# Patient Record
Sex: FEMALE | Race: WHITE | NOT HISPANIC OR LATINO | Employment: UNEMPLOYED | URBAN - NONMETROPOLITAN AREA
[De-identification: names, ages, dates, MRNs, and addresses within clinical notes are randomized per-mention and may not be internally consistent; named-entity substitution may affect disease eponyms.]

---

## 2017-01-31 ENCOUNTER — OFFICE VISIT (OUTPATIENT)
Dept: PSYCHIATRY | Facility: CLINIC | Age: 42
End: 2017-01-31

## 2017-01-31 VITALS
SYSTOLIC BLOOD PRESSURE: 132 MMHG | WEIGHT: 200.4 LBS | BODY MASS INDEX: 36.88 KG/M2 | DIASTOLIC BLOOD PRESSURE: 90 MMHG | HEIGHT: 62 IN

## 2017-01-31 DIAGNOSIS — F29 ATYPICAL PSYCHOSIS (HCC): Primary | ICD-10-CM

## 2017-01-31 PROCEDURE — 99213 OFFICE O/P EST LOW 20 MIN: CPT | Performed by: PSYCHIATRY & NEUROLOGY

## 2017-01-31 RX ORDER — LURASIDONE HYDROCHLORIDE 40 MG/1
40 TABLET, FILM COATED ORAL DAILY
Qty: 30 TABLET | Refills: 1 | Status: SHIPPED | OUTPATIENT
Start: 2017-01-31 | End: 2017-05-09 | Stop reason: ALTCHOICE

## 2017-01-31 NOTE — PROGRESS NOTES
"Patient ID: Jazzy Valle is a 41 y.o. female    SERVICE TYPE: EVALUATION AND MANAGEMENT (greater than 50% of the time spent for supportive psychotherapy).      Visit Vitals   • /90   • Ht 62\" (157.5 cm)   • Wt 200 lb 6.4 oz (90.9 kg)   • BMI 36.65 kg/m2       ALLERGIES:  Review of patient's allergies indicates no known allergies.    CC: \"Would like to change medicine, still hearing the voices\"    FOLLOWED FOR: depression/ psychosis.     HPI: voices \"get pretty wild\". \"Little depressed\". Interest and activities normal for her. Didn't hear voices with the Clozeril\". Sleeping good.   Being tapered at 2.5 strips for her cervical and lumbar pain. Paraesthesia right hand has been worse, s/p surgery 2008.  No indication of any drug misuse or abuse.    PFSH: 6 y/o adopted daughter doing well, living with her mother. Occasionally with her BF.All seems fairly stable.     Review of Systems   Respiratory: Negative.    Cardiovascular: Negative.    Gastrointestinal: Negative.        SUPPORTIVE PSYCHOTHERAPY: continuing efforts to promote the therapeutic alliance, address the patient’s issues, and strengthen self awareness, insights, and coping skills.       Mental Status Exam  Appearance:  clean and casually dressed, appropriate  Attitude toward clinician:  cooperative and agreeable   Speech:    Rate:  regular rate and rhythm   Volume: normal  Motor:  no abnormal movements present  Mood:  Good  Affect:  euthymic  Thought Processes:  linear, logical, and goal directed  Thought Content:  Normal   Feeling Hopeless: absent  Suicidal Thoughts:  absent  Homicidal Thoughts:  absent  Perceptual Disturbance: no perceptual disturbance  Attention and Concentration:  good  Insight and Judgement:  good  Memory:  memory appears to be intact    LABS:   Lab Results (last 7 days)     ** No results found for the last 168 hours. **          MEDICATION ISSUES: Have discussed with the patient the medications Risks, Benefits, and Side effects " including potential falls, possible impaired driving and  metabolic adversities among others. No medication side effects or related complaints today.     TREATMENT PLAN/GOALS: Continue supportive psychotherapy efforts and medications as indicated.     Current Outpatient Prescriptions   Medication Sig Dispense Refill   • escitalopram (LEXAPRO) 20 MG tablet One and one half daily 46 tablet 0   • gabapentin (NEURONTIN) 600 MG tablet      • ibuprofen (ADVIL,MOTRIN) 600 MG tablet      • lurasidone (LATUDA) 40 MG tablet tablet Take 1 tablet by mouth Daily. 30 tablet 1   • metFORMIN (GLUCOPHAGE) 500 MG tablet      • SUBOXONE 8-2 MG film film        No current facility-administered medications for this visit.        COLLATERAL PSYCHOTHERAPEUTIC INTERVENTION:patient is not interested in additional psychotherapy.     Encounter Diagnosis   Name Primary?   • Atypical psychosis Yes       Return in about 2 months (around 3/31/2017).  Patient knows to call if symptoms worsen or fail to improve between appointments.

## 2017-01-31 NOTE — MR AVS SNAPSHOT
Jazzy TRISTAN Leonard   1/31/2017 3:00 PM   Office Visit    Dept Phone:  [phone]   Encounter #:  [id number]    Provider:  Lloyd Barone MD   Department:  [facility]                Your Full Care Plan              Today's Medication Changes          These changes are accurate as of: 1/31/17  4:09 PM.  If you have any questions, ask your nurse or doctor.               New Medication(s)Ordered:     lurasidone 40 MG tablet tablet   Commonly known as:  LATUDA   Take 1 tablet by mouth Daily.         Stop taking medication(s)listed here:     haloperidol 5 MG tablet   Commonly known as:  HALDOL                Where to Get Your Medications      These medications were sent to [place], [place] - [address] [address] [address] - [phone] [place] [phone]   [address] [facility] [facility] [address]     Phone:  [phone]     lurasidone 40 MG tablet tablet                  Your Updated Medication List          This list is accurate as of: 1/31/17  4:09 PM.  Always use your most recent med list.                escitalopram 20 MG tablet   Commonly known as:  LEXAPRO   One and one half daily       gabapentin 600 MG tablet   Commonly known as:  NEURONTIN       ibuprofen 600 MG tablet   Commonly known as:  ADVIL,MOTRIN       lurasidone 40 MG tablet tablet   Commonly known as:  LATUDA   Take 1 tablet by mouth Daily.       metFORMIN 500 MG tablet   Commonly known as:  GLUCOPHAGE       SUBOXONE 8-2 MG film film   Generic drug:  buprenorphine-naloxone               You Were Diagnosed With        Codes Comments    Atypical psychosis    -  Primary ICD-10-CM: F29  ICD-9-CM: 298.9       Instructions     None    Patient Instructions History      Upcoming Appointments     Visit Type Date Time Department    MEDICINE CHECK 1/31/2017  3:00 PM [facility]    MEDICINE CHECK 3/29/2017 11:30 AM [facility]      MyChart Signup     [facility] MyChart allows you to  "send messages to your doctor, view your test results, renew your prescriptions, schedule appointments, and more. To sign up, go to [employer] and click on the Sign Up Now link in the New User? box. Enter your [employer] Activation Code exactly as it appears below along with the last four digits of your Social Security Number and your Date of Birth () to complete the sign-up process. If you do not sign up before the expiration date, you must request a new code.    [employer] Activation Code: [email or web address]  Expires: 2017  4:09 PM    If you have questions, you can email [email or web address] or call [phone] to talk to our [employer] staff. Remember, [employer] is NOT to be used for urgent needs. For medical emergencies, dial 911.               Other Info from Your Visit           Your Appointments     Mar 29, 2017 11:30 AM EDT   Medicine Check with Lloyd Barone MD   [facility] (--)    [address] [place] [id number]   [phone]              Allergies     No Known Allergies      Vital Signs     Blood Pressure Height Weight Body Mass Index Smoking Status       132/90 62\" (157.5 cm) 200 lb 6.4 oz (90.9 kg) 36.65 kg/m2 Current Every Day Smoker       Problems and Diagnoses Noted     Atypical psychosis    -  Primary        "

## 2017-02-06 ENCOUNTER — TELEPHONE (OUTPATIENT)
Dept: PSYCHIATRY | Facility: HOSPITAL | Age: 42
End: 2017-02-06

## 2017-02-06 NOTE — TELEPHONE ENCOUNTER
"Patient called and stated that she thinks the Latuda is making her blood pressure increase. States her \"bottom number has been over 100\".   "

## 2017-02-13 ENCOUNTER — TELEPHONE (OUTPATIENT)
Dept: PSYCHIATRY | Facility: CLINIC | Age: 42
End: 2017-02-13

## 2017-02-13 NOTE — TELEPHONE ENCOUNTER
Patient states the Latuda is making her blood pressure increase. Also states it isn't working. Requests change.

## 2017-02-14 ENCOUNTER — TELEPHONE (OUTPATIENT)
Dept: PSYCHIATRY | Facility: CLINIC | Age: 42
End: 2017-02-14

## 2017-02-15 ENCOUNTER — TELEPHONE (OUTPATIENT)
Dept: PSYCHIATRY | Facility: CLINIC | Age: 42
End: 2017-02-15

## 2017-02-15 NOTE — TELEPHONE ENCOUNTER
Contacted the patient. Reassured patient Latuda would not cause hypertension and advised her to actually increase the dose 80 mg daily due to continuing problems with auditory hallucinations. Too do so, will call PRN between now and next appointment.

## 2017-02-15 NOTE — TELEPHONE ENCOUNTER
Jazzy's mom said that Jazzy  is not doing well and needs you to call them or to change Jazzy's medication.

## 2017-03-06 ENCOUNTER — TELEPHONE (OUTPATIENT)
Dept: PSYCHIATRY | Facility: CLINIC | Age: 42
End: 2017-03-06

## 2017-03-06 ENCOUNTER — DOCUMENTATION (OUTPATIENT)
Dept: PSYCHIATRY | Facility: CLINIC | Age: 42
End: 2017-03-06

## 2017-03-06 NOTE — TELEPHONE ENCOUNTER
They are having problems with Jazzy and would like for you to call them if possible.    1600 hr. Had to leave message that I had call and for her to call back prn.

## 2017-03-06 NOTE — PROGRESS NOTES
Talked to the patient's mother concerned that the patient is continuing to experience Auditory Hallucinations, is pacing (possibly Akathisia?), and initial insomnia. No commanding hallucinations, no Si or HI.   Will stop the Latuda and starting tomorrow evening take Zyprexa 5 mg @ hs. Has supply for 13 days, will call back next week to let us know how she has been.

## 2017-03-14 RX ORDER — ESCITALOPRAM OXALATE 20 MG/1
TABLET ORAL
Qty: 46 TABLET | Refills: 0 | Status: CANCELLED | OUTPATIENT
Start: 2017-03-14

## 2017-03-14 NOTE — TELEPHONE ENCOUNTER
Requesting rx for lexapro.  Due to refill and next appointment is 3-29-17.      Patient called back today and requesting a call from you.  She said her medication isn't working and she was wanting to try something else.  Her next appointment is 3-29-17.

## 2017-03-15 ENCOUNTER — TELEPHONE (OUTPATIENT)
Dept: PSYCHIATRY | Facility: CLINIC | Age: 42
End: 2017-03-15

## 2017-03-17 ENCOUNTER — TELEPHONE (OUTPATIENT)
Dept: PSYCHIATRY | Facility: CLINIC | Age: 42
End: 2017-03-17

## 2017-03-17 RX ORDER — OLANZAPINE 5 MG/1
5 TABLET ORAL NIGHTLY
Qty: 30 TABLET | Refills: 0 | Status: SHIPPED | OUTPATIENT
Start: 2017-03-17 | End: 2017-04-17 | Stop reason: SDUPTHER

## 2017-03-17 RX ORDER — ESCITALOPRAM OXALATE 20 MG/1
TABLET ORAL
Qty: 46 TABLET | Refills: 0 | Status: SHIPPED | OUTPATIENT
Start: 2017-03-17 | End: 2017-04-17 | Stop reason: SDUPTHER

## 2017-03-17 NOTE — TELEPHONE ENCOUNTER
Pt's mother called stating that she is needing lexapro and zyprexa. Please call Viktoriya at [phone]    Will call in Rx for Zyprexa 5 mg daily and Lexapro 20 mg daily.

## 2017-03-30 ENCOUNTER — TELEPHONE (OUTPATIENT)
Dept: PSYCHIATRY | Facility: CLINIC | Age: 42
End: 2017-03-30

## 2017-04-11 ENCOUNTER — TELEPHONE (OUTPATIENT)
Dept: PSYCHIATRY | Facility: CLINIC | Age: 42
End: 2017-04-11

## 2017-04-11 NOTE — TELEPHONE ENCOUNTER
Attempted to call patient, had to leave a message noting that since patient's last appointment here in January 31 there've been separate phone calls and 3 missed appointments.  Advised him to call when necessary.

## 2017-04-18 RX ORDER — OLANZAPINE 5 MG/1
5 TABLET ORAL NIGHTLY
Qty: 30 TABLET | Refills: 0 | Status: SHIPPED | OUTPATIENT
Start: 2017-04-18 | End: 2017-05-09 | Stop reason: SDUPTHER

## 2017-04-18 RX ORDER — ESCITALOPRAM OXALATE 20 MG/1
TABLET ORAL
Qty: 46 TABLET | Refills: 0 | Status: SHIPPED | OUTPATIENT
Start: 2017-04-18 | End: 2017-05-09 | Stop reason: SDUPTHER

## 2017-04-18 NOTE — TELEPHONE ENCOUNTER
OK for the refills but let patient know last with seeing her in the clinic, will need to keep her appointment.

## 2017-05-09 ENCOUNTER — OFFICE VISIT (OUTPATIENT)
Dept: PSYCHIATRY | Facility: CLINIC | Age: 42
End: 2017-05-09

## 2017-05-09 VITALS
SYSTOLIC BLOOD PRESSURE: 124 MMHG | WEIGHT: 195 LBS | HEIGHT: 62 IN | BODY MASS INDEX: 35.88 KG/M2 | HEART RATE: 99 BPM | DIASTOLIC BLOOD PRESSURE: 82 MMHG

## 2017-05-09 DIAGNOSIS — Z79.899 LONG TERM USE OF DRUG: Primary | ICD-10-CM

## 2017-05-09 DIAGNOSIS — F29 ATYPICAL PSYCHOSIS (HCC): ICD-10-CM

## 2017-05-09 LAB
AMPHETAMINE CUT-OFF: ABNORMAL
BENZODIAZIPINE CUT-OFF: ABNORMAL
BUPRENORPHINE CUT-OFF: ABNORMAL
COCAINE CUT-OFF: ABNORMAL
EXTERNAL AMPHETAMINE SCREEN URINE: POSITIVE
EXTERNAL BENZODIAZEPINE SCREEN URINE: NEGATIVE
EXTERNAL BUPRENORPHINE SCREEN URINE: POSITIVE
EXTERNAL COCAINE SCREEN URINE: NEGATIVE
EXTERNAL MDMA: NEGATIVE
EXTERNAL METHADONE SCREEN URINE: NEGATIVE
EXTERNAL METHAMPHETAMINE SCREEN URINE: NEGATIVE
EXTERNAL OPIATES SCREEN URINE: NEGATIVE
EXTERNAL OXYCODONE SCREEN URINE: NEGATIVE
EXTERNAL THC SCREEN URINE: NEGATIVE
MDMA CUT-OFF: ABNORMAL
METHADONE CUT-OFF: ABNORMAL
METHAMPHETAMINE CUT-OFF: ABNORMAL
OPIATES CUT-OFF: ABNORMAL
OXYCODONE CUT-OFF: ABNORMAL
THC CUT-OFF: ABNORMAL

## 2017-05-09 PROCEDURE — 99213 OFFICE O/P EST LOW 20 MIN: CPT | Performed by: PSYCHIATRY & NEUROLOGY

## 2017-05-09 RX ORDER — NAPROXEN 500 MG/1
TABLET ORAL DAILY
Refills: 0 | Status: ON HOLD | COMMUNITY
Start: 2017-05-04 | End: 2019-10-01

## 2017-05-09 RX ORDER — ATENOLOL 50 MG/1
TABLET ORAL DAILY
Refills: 5 | Status: ON HOLD | COMMUNITY
Start: 2017-04-28 | End: 2019-10-01

## 2017-05-09 RX ORDER — OLANZAPINE 5 MG/1
7.5 TABLET ORAL NIGHTLY
Qty: 30 TABLET | Refills: 1 | Status: SHIPPED | OUTPATIENT
Start: 2017-05-09 | End: 2017-06-16 | Stop reason: SDUPTHER

## 2017-05-09 RX ORDER — ESCITALOPRAM OXALATE 20 MG/1
TABLET ORAL
Qty: 46 TABLET | Refills: 1 | Status: SHIPPED | OUTPATIENT
Start: 2017-05-09 | End: 2017-08-03 | Stop reason: SDUPTHER

## 2017-05-09 RX ORDER — FLUTICASONE PROPIONATE 50 MCG
SPRAY, SUSPENSION (ML) NASAL
Refills: 2 | COMMUNITY
Start: 2017-04-28 | End: 2019-05-23

## 2017-05-23 ENCOUNTER — TELEPHONE (OUTPATIENT)
Dept: PSYCHIATRY | Facility: CLINIC | Age: 42
End: 2017-05-23

## 2017-05-24 ENCOUNTER — TELEPHONE (OUTPATIENT)
Dept: PSYCHIATRY | Facility: CLINIC | Age: 42
End: 2017-05-24

## 2017-05-25 ENCOUNTER — TELEPHONE (OUTPATIENT)
Dept: PSYCHIATRY | Facility: CLINIC | Age: 42
End: 2017-05-25

## 2017-05-26 ENCOUNTER — TELEPHONE (OUTPATIENT)
Dept: PSYCHIATRY | Facility: CLINIC | Age: 42
End: 2017-05-26

## 2017-06-16 RX ORDER — OLANZAPINE 5 MG/1
5 TABLET ORAL 2 TIMES DAILY
Qty: 60 TABLET | Refills: 0 | Status: SHIPPED | OUTPATIENT
Start: 2017-06-16 | End: 2017-08-03 | Stop reason: SDUPTHER

## 2017-06-16 NOTE — TELEPHONE ENCOUNTER
Rx request.  Med are due for refill, on 5-26-17 you increased dose to 5mg BID.  Her next appt is 7-11-17.

## 2017-08-03 RX ORDER — ESCITALOPRAM OXALATE 20 MG/1
TABLET ORAL
Qty: 46 TABLET | Refills: 0 | Status: SHIPPED | OUTPATIENT
Start: 2017-08-03 | End: 2017-08-29 | Stop reason: SDUPTHER

## 2017-08-03 RX ORDER — OLANZAPINE 5 MG/1
5 TABLET ORAL 2 TIMES DAILY
Qty: 60 TABLET | Refills: 0 | Status: SHIPPED | OUTPATIENT
Start: 2017-08-03 | End: 2017-08-29 | Stop reason: SDUPTHER

## 2017-08-03 NOTE — TELEPHONE ENCOUNTER
Rx refill,  meds are due.  However, patient has no pending appt, no showed for appt on 7-11-17 and was last seen 5-9-17.

## 2017-08-29 RX ORDER — OLANZAPINE 5 MG/1
5 TABLET ORAL 2 TIMES DAILY
Qty: 60 TABLET | Refills: 0 | Status: SHIPPED | OUTPATIENT
Start: 2017-08-29 | End: 2017-12-11 | Stop reason: SDUPTHER

## 2017-08-29 RX ORDER — ESCITALOPRAM OXALATE 20 MG/1
TABLET ORAL
Qty: 46 TABLET | Refills: 0 | Status: SHIPPED | OUTPATIENT
Start: 2017-08-29 | End: 2017-09-12 | Stop reason: SDUPTHER

## 2017-09-12 ENCOUNTER — TELEPHONE (OUTPATIENT)
Dept: PSYCHIATRY | Facility: CLINIC | Age: 42
End: 2017-09-12

## 2017-09-12 RX ORDER — ESCITALOPRAM OXALATE 20 MG/1
TABLET ORAL
Qty: 46 TABLET | Refills: 0 | Status: SHIPPED | OUTPATIENT
Start: 2017-09-12 | End: 2017-12-11 | Stop reason: SDUPTHER

## 2017-09-12 NOTE — TELEPHONE ENCOUNTER
"Received email asking that I call the patient's mother ([phone]).    Says her voices are worse, have not seen the patient since May.   Says taking the Zyprexa 5 mg daily (not bid), the Lexapro 20 mg daily.   \"Not depressed\"  Voices \"told me to go to the hospital, then to not go\". No SI/HI.   No paranoia.   Will keep her appointment Oct 17, or come to the hospital sooner PRN.   Called in her meds to do till then.           "

## 2017-11-30 ENCOUNTER — TELEPHONE (OUTPATIENT)
Dept: PSYCHIATRY | Facility: CLINIC | Age: 42
End: 2017-11-30

## 2017-11-30 NOTE — TELEPHONE ENCOUNTER
Significant give the patient appointment in the next week or 2, call her mother and let her know when it is.

## 2017-12-11 ENCOUNTER — OFFICE VISIT (OUTPATIENT)
Dept: PSYCHIATRY | Facility: CLINIC | Age: 42
End: 2017-12-11

## 2017-12-11 VITALS
BODY MASS INDEX: 33.86 KG/M2 | SYSTOLIC BLOOD PRESSURE: 116 MMHG | DIASTOLIC BLOOD PRESSURE: 81 MMHG | WEIGHT: 184 LBS | HEART RATE: 89 BPM | HEIGHT: 62 IN

## 2017-12-11 DIAGNOSIS — F29 ATYPICAL PSYCHOSIS (HCC): Primary | ICD-10-CM

## 2017-12-11 DIAGNOSIS — E78.9 DISORDER OF LIPOPROTEIN METABOLISM: ICD-10-CM

## 2017-12-11 DIAGNOSIS — E16.1 OTHER HYPOGLYCEMIA (CODE): ICD-10-CM

## 2017-12-11 DIAGNOSIS — G89.4 CHRONIC PAIN SYNDROME: ICD-10-CM

## 2017-12-11 PROCEDURE — 99213 OFFICE O/P EST LOW 20 MIN: CPT | Performed by: PSYCHIATRY & NEUROLOGY

## 2017-12-11 RX ORDER — GABAPENTIN 600 MG/1
TABLET ORAL 3 TIMES DAILY
Refills: 0 | Status: ON HOLD | COMMUNITY
Start: 2017-12-04 | End: 2019-10-01

## 2017-12-11 RX ORDER — ESCITALOPRAM OXALATE 20 MG/1
TABLET ORAL
Qty: 90 TABLET | Refills: 1 | Status: SHIPPED | OUTPATIENT
Start: 2017-12-11 | End: 2017-12-11 | Stop reason: SDUPTHER

## 2017-12-11 RX ORDER — ESCITALOPRAM OXALATE 20 MG/1
TABLET ORAL
Qty: 138 TABLET | Refills: 1 | Status: SHIPPED | OUTPATIENT
Start: 2017-12-11 | End: 2018-02-01 | Stop reason: SDUPTHER

## 2017-12-11 RX ORDER — OLANZAPINE 5 MG/1
TABLET ORAL
Qty: 90 TABLET | Refills: 1 | Status: SHIPPED | OUTPATIENT
Start: 2017-12-11 | End: 2018-02-01 | Stop reason: SDUPTHER

## 2017-12-11 NOTE — PROGRESS NOTES
"Patient ID: Jazzy Valle is a 42 y.o. female    SERVICE TYPE: EVALUATION AND MANAGEMENT (greater than 50% of the time spent for supportive psychotherapy).      /81  Pulse 89  Ht 157 cm (61.81\")  Wt 83.5 kg (184 lb)  BMI 33.86 kg/m2    ALLERGIES:  Review of patient's allergies indicates no known allergies.    CC: \"OK, feel better than I was\"    FOLLOWED FOR: depression/ psychosis    HPI:Last seen in May, 9 phone calls since. Experiencing auditory hallucination, no commends, no specifics. Sleeping well, not depressed, no paranoia, no anxiety attaches. Interest, energy, activities all relatively normal.   Receipt of the SSRI benefits continue.  Receives approximately 20 mg daily of the Suboxone strips plus gabapentin from the [facility].    PFSH: Still raising the cousin's 6 y/o daughter, living with mother (sever COPD). 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 No intervening major psychosocial stresses since last contact. A cousin did die a month ago.     Review of Systems   Respiratory: Negative.    Cardiovascular: Negative.    Gastrointestinal: Negative.    Neurological: Negative.        SUPPORTIVE PSYCHOTHERAPY: continuing efforts to promote the therapeutic alliance, address the patient’s issues, and strengthen self awareness, insights, and coping skills.       Mental Status Exam  Appearance:  clean and casually dressed, appropriate  Attitude toward clinician:  cooperative and agreeable   Speech:    Rate:  regular rate and rhythm   Volume: normal  Motor:  no abnormal movements present  Mood:  Good  Affect:  euthymic  Thought Processes:  linear, logical, and goal directed  Thought Content:  Normal   Feeling Hopeless: absent  Suicidal Thoughts:  absent  Homicidal Thoughts:  absent  Perceptual Disturbance: no perceptual disturbance  Attention and Concentration:  good  Insight and Judgement:  good  Memory:  memory appears to be intact    LABS:  CMP, Lipids, Hb A1c and UDS this month. "     MEDICATION ISSUES: Have discussed with the patient the medications Risks, Benefits, and Side effects including potential falls, possible impaired driving and  metabolic adversities among others. No medication side effects or related complaints today.     TREATMENT PLAN/GOALS: Continue supportive psychotherapy efforts and medications as indicated.     Current Outpatient Prescriptions   Medication Sig Dispense Refill   • atenolol (TENORMIN) 50 MG tablet Daily.  5   • escitalopram (LEXAPRO) 20 MG tablet One and one half daily 90 tablet 1   • fluticasone (FLONASE) 50 MCG/ACT nasal spray   2   • gabapentin (NEURONTIN) 600 MG tablet 3 (Three) Times a Day.  0   • ibuprofen (ADVIL,MOTRIN) 600 MG tablet      • metFORMIN (GLUCOPHAGE) 500 MG tablet      • naproxen (NAPROSYN) 500 MG tablet Daily.  0   • OLANZapine (ZYPREXA) 5 MG tablet One daily 90 tablet 1   • SUBOXONE 8-2 MG film film        No current facility-administered medications for this visit.        COLLATERAL PSYCHOTHERAPEUTIC INTERVENTION: patient not interested in additional psychotherapy.     Encounter Diagnosis   Name Primary?   • Atypical psychosis Yes       Return in about 6 months (around 6/11/2018).  Patient knows to call if symptoms worsen or fail to improve between appointments.

## 2017-12-27 ENCOUNTER — TELEPHONE (OUTPATIENT)
Dept: PSYCHIATRY | Facility: CLINIC | Age: 42
End: 2017-12-27

## 2017-12-27 NOTE — TELEPHONE ENCOUNTER
Patient called stating on the Zyprexa she is hearing stuff states is there some other medication you can add to it or change her to another medication. Please advise.

## 2017-12-28 NOTE — TELEPHONE ENCOUNTER
Advised patient to simply increase the dose of Zyprexa and schedule her for employment within the next 4-6 weeks.

## 2018-02-01 ENCOUNTER — OFFICE VISIT (OUTPATIENT)
Dept: PSYCHIATRY | Facility: CLINIC | Age: 43
End: 2018-02-01

## 2018-02-01 VITALS
SYSTOLIC BLOOD PRESSURE: 125 MMHG | HEIGHT: 62 IN | WEIGHT: 194 LBS | DIASTOLIC BLOOD PRESSURE: 81 MMHG | HEART RATE: 100 BPM | BODY MASS INDEX: 35.7 KG/M2

## 2018-02-01 DIAGNOSIS — F29 ATYPICAL PSYCHOSIS (HCC): Primary | ICD-10-CM

## 2018-02-01 DIAGNOSIS — E78.9 DISORDER OF LIPOPROTEIN METABOLISM: ICD-10-CM

## 2018-02-01 DIAGNOSIS — R73.9 HYPERGLYCEMIA: ICD-10-CM

## 2018-02-01 PROCEDURE — 99213 OFFICE O/P EST LOW 20 MIN: CPT | Performed by: PSYCHIATRY & NEUROLOGY

## 2018-02-01 RX ORDER — ESCITALOPRAM OXALATE 20 MG/1
TABLET ORAL
Qty: 138 TABLET | Refills: 0 | Status: SHIPPED | OUTPATIENT
Start: 2018-02-01 | End: 2018-08-15 | Stop reason: SDUPTHER

## 2018-02-01 RX ORDER — OLANZAPINE 5 MG/1
TABLET ORAL
Qty: 90 TABLET | Refills: 0 | Status: SHIPPED | OUTPATIENT
Start: 2018-02-01 | End: 2018-03-30

## 2018-02-01 NOTE — PROGRESS NOTES
"Patient ID: Jazzy Valle is a 42 y.o. female    SERVICE TYPE: EVALUATION AND MANAGEMENT (greater than 50% of the time spent for supportive psychotherapy).      /81  Pulse 100  Ht 157 cm (61.81\")  Wt 88 kg (194 lb)  BMI 35.7 kg/m2    ALLERGIES:  Review of patient's allergies indicates no known allergies.    CC: \"Decent\"    FOLLOWED FOR: depression/ psychosis       HPI: \"Some voices, not as loud\" no commands. Considers possible her thoughts. Not been significantly depressed. Sleep normally. No current paranoia. \"The voices have calmed down\" going about her activities without limitation. 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 Keeps her busy. Also raising her 5 y/o cousin, that goes well.     PFSH: mother's COPD gradually worse. No current BF. Home life stable.     Review of Systems   Respiratory: Negative.    Cardiovascular: Negative.    Gastrointestinal: Negative.    Musculoskeletal: Positive for back pain and neck pain.   Continues Suboxone 20 mgs daily plus Neurontin 600 mg tid per [facility] [place] $375/ month.     SUPPORTIVE PSYCHOTHERAPY: continuing efforts to promote the therapeutic alliance, address the patient’s issues, and strengthen self awareness, insights, and coping skills.       Mental Status Exam  Appearance:  clean and casually dressed, appropriate  Attitude toward clinician:  cooperative and agreeable   Speech:    Rate:  regular rate and rhythm   Volume: normal  Motor:  no abnormal movements present  Mood:  Good  Affect:  euthymic  Thought Processes:  linear, logical, and goal directed  Thought Content:  Normal   Feeling Hopeless: absent  Suicidal Thoughts:  absent  Homicidal Thoughts:  absent  Perceptual Disturbance: no perceptual disturbance  Attention and Concentration:  good  Insight and Judgement:  good  Memory:  memory appears to be intact    LABS: Patient to have a fasting CMP Lipid panel and HbA1c done this next week [facility] lab. "     MEDICATION ISSUES: Have discussed with the patient the medications Risks, Benefits, and Side effects including potential falls, possible impaired driving and  metabolic adversities among others. No medication side effects or related complaints today.     TREATMENT PLAN/GOALS: Continue supportive psychotherapy efforts and medications as indicated.     Current Outpatient Prescriptions   Medication Sig Dispense Refill   • atenolol (TENORMIN) 50 MG tablet Daily.  5   • escitalopram (LEXAPRO) 20 MG tablet One and one half daily 138 tablet 0   • fluticasone (FLONASE) 50 MCG/ACT nasal spray   2   • gabapentin (NEURONTIN) 600 MG tablet 3 (Three) Times a Day.  0   • ibuprofen (ADVIL,MOTRIN) 600 MG tablet      • metFORMIN (GLUCOPHAGE) 500 MG tablet      • naproxen (NAPROSYN) 500 MG tablet Daily.  0   • OLANZapine (ZYPREXA) 5 MG tablet One daily 90 tablet 0   • SUBOXONE 8-2 MG film film        No current facility-administered medications for this visit.        COLLATERAL PSYCHOTHERAPEUTIC INTERVENTION: patient not interested in additional psychotherapy.     Encounter Diagnoses   Name Plan   • Atypical psychosis Continue the Lexapro and Zyprexa    • Hyperglycemia  Concern for metabolic side effects of the Zyprexa diagnosis justifies the blood test ordered.     • Disorder of lipoprotein metabolism  Concern for the potential metabolic side effects of the Zyprexa        Return in about 3 months (around 5/1/2018).  Patient knows to call if symptoms worsen or fail to improve between appointments.

## 2018-02-08 ENCOUNTER — OFFICE VISIT (OUTPATIENT)
Dept: RETAIL CLINIC | Facility: CLINIC | Age: 43
End: 2018-02-08

## 2018-02-08 VITALS
HEART RATE: 84 BPM | BODY MASS INDEX: 35.63 KG/M2 | WEIGHT: 193.6 LBS | TEMPERATURE: 98.2 F | RESPIRATION RATE: 18 BRPM | OXYGEN SATURATION: 98 %

## 2018-02-08 DIAGNOSIS — K59.00 CONSTIPATION, UNSPECIFIED CONSTIPATION TYPE: Primary | ICD-10-CM

## 2018-02-08 PROCEDURE — 99213 OFFICE O/P EST LOW 20 MIN: CPT | Performed by: NURSE PRACTITIONER

## 2018-02-09 NOTE — PATIENT INSTRUCTIONS
Constipation, Adult  Constipation is when a person:  · Poops (has a bowel movement) fewer times in a week than normal.  · Has a hard time pooping.  · Has poop that is dry, hard, or bigger than normal.  Follow these instructions at home:  Eating and drinking  · Eat foods that have a lot of fiber, such as:  ¨ Fresh fruits and vegetables.  ¨ Whole grains.  ¨ Beans.  · Eat less of foods that are high in fat, low in fiber, or overly processed, such as:  ¨ French fries.  ¨ Hamburgers.  ¨ Cookies.  ¨ Candy.  ¨ Soda.  · Drink enough fluid to keep your pee (urine) clear or pale yellow.  General instructions  · Exercise regularly or as told by your doctor.  · Go to the restroom when you feel like you need to poop. Do not hold it in.  · Take over-the-counter and prescription medicines only as told by your doctor. These include any fiber supplements.  · Do pelvic floor retraining exercises, such as:  ¨ Doing deep breathing while relaxing your lower belly (abdomen).  ¨ Relaxing your pelvic floor while pooping.  · Watch your condition for any changes.  · Keep all follow-up visits as told by your doctor. This is important.  Contact a doctor if:  · You have pain that gets worse.  · You have a fever.  · You have not pooped for 4 days.  · You throw up (vomit).  · You are not hungry.  · You lose weight.  · You are bleeding from the anus.  · You have thin, pencil-like poop (stool).  Get help right away if:  · You have a fever, and your symptoms suddenly get worse.  · You leak poop or have blood in your poop.  · Your belly feels hard or bigger than normal (is bloated).  · You have very bad belly pain.  · You feel dizzy or you faint.  This information is not intended to replace advice given to you by your health care provider. Make sure you discuss any questions you have with your health care provider.  Document Released: 06/05/2009 Document Revised: 07/07/2017 Document Reviewed: 06/07/2017  [employer] Interactive Patient Education © 2017  [employer]

## 2018-02-09 NOTE — PROGRESS NOTES
Subjective   Jazzy Valle is a 42 y.o. female.   Chief Complaint   Patient presents with   • GI Problem      GI Problem   The primary symptoms include abdominal pain. Primary symptoms do not include fever, weight loss, fatigue, nausea, vomiting, diarrhea or dysuria. The illness began yesterday. The onset was gradual. The problem has been gradually improving.   The illness is also significant for constipation (no BM in 3-4 days). The illness does not include chills, anorexia, dysphagia, bloating, tenesmus or back pain.        The following portions of the patient's history were reviewed and updated as appropriate: allergies, current medications, past family history, past medical history, past social history, past surgical history and problem list.    Review of Systems   Constitutional: Negative.  Negative for chills, fatigue, fever and weight loss.   HENT: Negative.    Eyes: Negative.    Respiratory: Negative.    Gastrointestinal: Positive for abdominal pain and constipation (no BM in 3-4 days). Negative for abdominal distention, anorexia, bloating, diarrhea, dysphagia, nausea and vomiting.   Genitourinary: Negative.  Negative for dysuria.   Musculoskeletal: Negative for back pain.   Skin: Negative.    Allergic/Immunologic: Negative.    Psychiatric/Behavioral: Negative.        Objective   No Known Allergies    Physical Exam   Constitutional: She is oriented to person, place, and time. She appears well-developed and well-nourished.   HENT:   Mouth/Throat: Oropharynx is clear and moist.   Eyes: Pupils are equal, round, and reactive to light.   Cardiovascular: Normal rate.    Pulmonary/Chest: Effort normal.   Abdominal: Soft. Bowel sounds are normal. She exhibits no distension. There is no hepatosplenomegaly. There is no tenderness. There is no rigidity, no rebound, no guarding, no CVA tenderness and negative Newberry's sign.   Neurological: She is oriented to person, place, and time.   Skin: Skin is warm and dry.    Psychiatric: She has a normal mood and affect.   Nursing note and vitals reviewed.      Assessment/Plan   Jazzy was seen today for gi problem.    Diagnoses and all orders for this visit:    Constipation, unspecified constipation type                 This document has been electronically signed by RICARDO Javier February 8, 2018 7:19 PM

## 2018-03-28 ENCOUNTER — TELEPHONE (OUTPATIENT)
Dept: PSYCHIATRY | Facility: CLINIC | Age: 43
End: 2018-03-28

## 2018-03-29 ENCOUNTER — TELEPHONE (OUTPATIENT)
Dept: PSYCHIATRY | Facility: CLINIC | Age: 43
End: 2018-03-29

## 2018-03-29 NOTE — TELEPHONE ENCOUNTER
Patients mother called back stating she cannot get her to appointment today, she was very upset and rude stating she did not get the voicemail that was left yesterday. She is demanding that you called her as soon as possible.

## 2018-03-29 NOTE — TELEPHONE ENCOUNTER
"Talked with the mother: \"Back up again, off and on again, voices\" Will have the patient here tomorrow AM, unable to arrange for her to be here at the clinic this morning.   "

## 2018-03-30 ENCOUNTER — OFFICE VISIT (OUTPATIENT)
Dept: PSYCHIATRY | Facility: CLINIC | Age: 43
End: 2018-03-30

## 2018-03-30 VITALS
BODY MASS INDEX: 34.04 KG/M2 | HEIGHT: 62 IN | DIASTOLIC BLOOD PRESSURE: 84 MMHG | SYSTOLIC BLOOD PRESSURE: 116 MMHG | HEART RATE: 94 BPM | WEIGHT: 185 LBS

## 2018-03-30 DIAGNOSIS — Z79.899 MEDICATION MANAGEMENT: Primary | ICD-10-CM

## 2018-03-30 DIAGNOSIS — F29 ATYPICAL PSYCHOSIS (HCC): ICD-10-CM

## 2018-03-30 LAB
AMPHETAMINE CUT-OFF: 1000
BENZODIAZIPINE CUT-OFF: 300
BUPRENORPHINE CUT-OFF: 10
COCAINE CUT-OFF: 300
EXTERNAL AMPHETAMINE SCREEN URINE: POSITIVE
EXTERNAL BENZODIAZEPINE SCREEN URINE: NEGATIVE
EXTERNAL BUPRENORPHINE SCREEN URINE: POSITIVE
EXTERNAL COCAINE SCREEN URINE: NEGATIVE
EXTERNAL MDMA: NEGATIVE
EXTERNAL METHADONE SCREEN URINE: NEGATIVE
EXTERNAL METHAMPHETAMINE SCREEN URINE: NEGATIVE
EXTERNAL OPIATES SCREEN URINE: NEGATIVE
EXTERNAL OXYCODONE SCREEN URINE: NEGATIVE
EXTERNAL THC SCREEN URINE: NEGATIVE
MDMA CUT-OFF: 500
METHADONE CUT-OFF: 300
METHAMPHETAMINE CUT-OFF: 1000
OPIATES CUT-OFF: 300
OXYCODONE CUT-OFF: 100
THC CUT-OFF: 50

## 2018-03-30 PROCEDURE — 99214 OFFICE O/P EST MOD 30 MIN: CPT | Performed by: PSYCHIATRY & NEUROLOGY

## 2018-03-30 RX ORDER — OLANZAPINE 10 MG/1
TABLET ORAL
Qty: 30 TABLET | Refills: 0 | Status: SHIPPED | OUTPATIENT
Start: 2018-03-30 | End: 2018-08-15 | Stop reason: SDUPTHER

## 2018-03-30 RX ORDER — BUPRENORPHINE HYDROCHLORIDE AND NALOXONE HYDROCHLORIDE 5.7; 1.4 MG/1; MG/1
TABLET, ORALLY DISINTEGRATING SUBLINGUAL
Refills: 0 | COMMUNITY
Start: 2018-03-07 | End: 2018-03-30 | Stop reason: SDUPTHER

## 2018-03-30 NOTE — PROGRESS NOTES
"Patient ID: Jazzy Valle is a 42 y.o. female    SERVICE TYPE: EVALUATION AND MANAGEMENT (greater than 50% of the time spent for supportive psychotherapy).  Time in 1140    time out 1212    /84   Pulse 94   Ht 157 cm (61.81\")   Wt 83.9 kg (185 lb)   BMI 34.04 kg/m²  Weight down 8 lbs since last contact 2/1/18    ALLERGIES:  Review of patient's allergies indicates no known allergies.    CC: \"Voices and not sleeping\"     Here with her mother and aunt.     FOLLOWED FOR: depression/ psychosis    HPI: Gradually more problems past 3-4 months according to the mother. Auditory hallucination (no commands according to the patient, mother says they do tell the patient what to do, \"she fights against them\" ) Denies SI or HI. Initial insomnia, pacing during the night. Apparently some problems between her and her mother: \"she thinks I'm against\".   Reports has been taking the Zyprexa and Lexapro. Mother wants her on something besides the Zyprexa. Prior trails of Latuda and Abilify failed.   Would like to try Cariprazine (Vraylar) but will need to be precerted (will try next week). For now the patient is to increase the Zyprexa to 15 mg daily.     NOTE: AFTER PATIENT LEFT UDS REPORT NOTED POSITIVE NOT ONLY FOR THE BUPRENORPHINE (RX'ED) BUT ALSO AMPHETAMINE!    PFSH:5 y/o daughter (has legal custody of the cousin's child) is doing well, mother's health poor (COPD).     Review of Systems   Constitutional: Negative.    HENT: Negative.    Eyes: Negative.    Respiratory: Negative.    Cardiovascular: Negative.    Gastrointestinal: Negative.    Endocrine: Negative.    Genitourinary: Negative.    Musculoskeletal: Negative.    Skin: Negative.    Allergic/Immunologic: Negative.    Neurological: Negative.    Hematological: Negative.        SUPPORTIVE PSYCHOTHERAPY: continuing efforts to promote the therapeutic alliance, address the patient’s issues, and strengthen self awareness, insights, and coping skills.       Mental Status " "Exam  Appearance:  clean and casually dressed, appropriate  Attitude toward clinician:  cooperative and agreeable   Speech:    Rate:  regular rate and rhythm   Volume: normal  Motor:  no abnormal movements present  Mood:  \"Not bad depressed\"   Affect:  Blunted.   Thought Processes:  linear, logical, and goal directed  Thought Content:  Probably more bizarre than the patient is willing to expose.    Feeling Hopeless: absent  Suicidal Thoughts:  absent  Homicidal Thoughts:  absent  Perceptual Disturbance: Auditory hallucinations  Attention and Concentration:  Fair  Insight and Judgement:  Fair  Memory:  memory appears to be intact    LABS:   Recent Results (from the past 168 hour(s))   [facility] Screen    Collection Time: 03/30/18 10:54 AM   Result Value Ref Range    External Amphetamine Screen Urine Positive     Amphetamine Cut-Off 1,000     External Benzodiazepine Screen Urine Negative     Benzodiazipine Cut-Off 300     External Cocaine Screen Urine Negative     Cocaine Cut-Off 300     External THC Screen Urine Negative     THC Cut-Off 50     External Methadone Screen Urine Negative     Methadone Cut-Off 300     External Methamphetamine Screen Urine Negative     Methamphetamine Cut-Off 1,000     External Oxycodone Screen Urine Negative     Oxycodone Cut-Off 100     External Buprenorphine Screen Urine Positive     Buprenorphine Cut-Off 10     External MDMA Negative     MDMA Cut-Off 500     External Opiates Screen Urine Negative     Opiates Cut-Off 300        MEDICATION ISSUES: Have discussed with the patient the medications Risks, Benefits, and Side effects including potential falls, possible impaired driving and  metabolic adversities among others. No medication side effects or related complaints today.     TREATMENT PLAN/GOALS: Continue supportive psychotherapy efforts and medications as indicated.     Current Outpatient Prescriptions   Medication Sig Dispense Refill   • atenolol (TENORMIN) 50 MG tablet Daily.  " 5   • escitalopram (LEXAPRO) 20 MG tablet One and one half daily Has supply    • fluticasone (FLONASE) 50 MCG/ACT nasal spray   2   • gabapentin (NEURONTIN) 600 MG tablet 3 (Three) Times a Day.  0   • ibuprofen (ADVIL,MOTRIN) 600 MG tablet      • metFORMIN (GLUCOPHAGE) 500 MG tablet      • naproxen (NAPROSYN) 500 MG tablet Daily.  0   • psyllium (METAMUCIL) 58.6 % packet Take 1 packet by mouth Daily.     • SUBOXONE 8-2 MG film film      • OLANZapine (ZYPREXA) 10 MG tablet Take 1 daily@8 PM along with 5 mg Zyprexa, total daily dose 15 mg. 30 tablet 0     No current facility-administered medications for this visit.        COLLATERAL PSYCHOTHERAPEUTIC INTERVENTION: patient not interested in additional psychotherapy.     Encounter Diagnoses   Name Primary?   • Medication management Yes   • Atypical psychosis        No Follow-up on file.  Patient knows to call if symptoms worsen or fail to improve between appointments.

## 2018-04-03 ENCOUNTER — TELEPHONE (OUTPATIENT)
Dept: PSYCHIATRY | Facility: CLINIC | Age: 43
End: 2018-04-03

## 2018-04-03 NOTE — TELEPHONE ENCOUNTER
Patient called needing a PA on Vraylar, I did not see that you sent this script in. Please advise.

## 2018-04-04 ENCOUNTER — PRIOR AUTHORIZATION (OUTPATIENT)
Dept: PSYCHIATRY | Facility: CLINIC | Age: 43
End: 2018-04-04

## 2018-04-11 ENCOUNTER — TELEPHONE (OUTPATIENT)
Dept: PSYCHIATRY | Facility: CLINIC | Age: 43
End: 2018-04-11

## 2018-04-11 NOTE — TELEPHONE ENCOUNTER
But the mother know that I'll need to see the patient before initiating the new medication, Cariprazine.

## 2018-04-11 NOTE — TELEPHONE ENCOUNTER
Patient's mother called upset, wanting Dr Barone to call her. States she needs the new medication called in. I assume she's talking about Vraylar.     Also, states she needs to cancel tomorrow's appt.

## 2018-04-12 ENCOUNTER — OFFICE VISIT (OUTPATIENT)
Dept: PSYCHIATRY | Facility: CLINIC | Age: 43
End: 2018-04-12

## 2018-04-12 VITALS
DIASTOLIC BLOOD PRESSURE: 71 MMHG | HEART RATE: 78 BPM | HEIGHT: 62 IN | BODY MASS INDEX: 34.6 KG/M2 | SYSTOLIC BLOOD PRESSURE: 109 MMHG | WEIGHT: 188 LBS

## 2018-04-12 DIAGNOSIS — F29 ATYPICAL PSYCHOSIS (HCC): Primary | ICD-10-CM

## 2018-04-12 PROCEDURE — 99213 OFFICE O/P EST LOW 20 MIN: CPT | Performed by: PSYCHIATRY & NEUROLOGY

## 2018-04-12 NOTE — PROGRESS NOTES
"Patient ID: Jazzy Valle is a 42 y.o. female    SERVICE TYPE: EVALUATION AND MANAGEMENT (greater than 50% of the time spent for supportive psychotherapy).      /71   Pulse 78   Ht 157 cm (61.81\")   Wt 85.3 kg (188 lb)   BMI 34.60 kg/m²     ALLERGIES:  Review of patient's allergies indicates no known allergies.    CC: \"Doing OK\"     FOLLOWED FOR: depression/ psychosis    HPI: Initially seen with her mother, mother exiting after a brief session     Depression: \"not depressed\".   Psychosis: \"hear them a lot\" \"their are suppose to help me, trying to figure it out\". Traces of paranoia. Sleeping normally. No ideas of reference. Traces of ideas of passivity \"like the voices know stuff, could be my thoughts\".     Reports takes Adipex per a weight lost clinic in [facility]. Also Rx'ed Neurontin and Suboxone (off label for cervical neck pain), also Naproxen)  \"I'll think I'll stop\".     PFSH: Home with mom and Shayna.     Review of Systems   Respiratory: Negative.    Cardiovascular: Negative.    Gastrointestinal: Negative.    Musculoskeletal: Positive for back pain and neck pain.   Neurological: Negative.        SUPPORTIVE PSYCHOTHERAPY: continuing efforts to promote the therapeutic alliance, address the patient’s issues, and strengthen self awareness, insights, and coping skills.       Mental Status Exam  Appearance:  clean and casually dressed, appropriate  Attitude toward clinician:  cooperative and agreeable   Speech:    Rate:  regular rate and rhythm   Volume: normal  Motor:  no abnormal movements present  Mood:  Good  Affect:  euthymic  Thought Processes:  linear, logical, and goal directed  Thought Content:  Normal   Feeling Hopeless: absent  Suicidal Thoughts:  absent  Homicidal Thoughts:  absent  Perceptual Disturbance: no perceptual disturbance  Attention and Concentration:  good  Insight and Judgement:  good  Memory:  memory appears to be intact    LABS: No results found for this or any previous visit " (from the past 168 hour(s)).    MEDICATION ISSUES: Have discussed with the patient the medications Risks, Benefits, and Side effects including potential falls, possible impaired driving and  metabolic adversities among others. No medication side effects or related complaints today.     TREATMENT PLAN/GOALS: Continue supportive psychotherapy efforts and medications as indicated.     Current Outpatient Prescriptions   Medication Sig Dispense Refill   • atenolol (TENORMIN) 50 MG tablet Daily.  5   • escitalopram (LEXAPRO) 20 MG tablet One and one half daily 138 tablet 0   • fluticasone (FLONASE) 50 MCG/ACT nasal spray   2   • gabapentin (NEURONTIN) 600 MG tablet 3 (Three) Times a Day.  0   • ibuprofen (ADVIL,MOTRIN) 600 MG tablet      • metFORMIN (GLUCOPHAGE) 500 MG tablet      • naproxen (NAPROSYN) 500 MG tablet Daily.  0   •       • psyllium (METAMUCIL) 58.6 % packet Take 1 packet by mouth Daily.     • SUBOXONE 8-2 MG film film      • Cariprazine HCl (VRAYLAR) 1.5 MG capsule capsule One daily for 1 week, then increase to 2 daily. 60 capsule 0     No current facility-administered medications for this visit.        COLLATERAL PSYCHOTHERAPEUTIC INTERVENTION: patient not interested in additional psychotherapy.     Encounter Diagnosis   Name Primary?   • Atypical psychosis Yes       Return in about 4 weeks (around 5/10/2018).  Patient knows to call if symptoms worsen or fail to improve between appointments.

## 2018-08-15 RX ORDER — ESCITALOPRAM OXALATE 20 MG/1
TABLET ORAL
Qty: 138 TABLET | Refills: 0 | Status: SHIPPED | OUTPATIENT
Start: 2018-08-15 | End: 2018-12-04 | Stop reason: SDUPTHER

## 2018-08-15 RX ORDER — OLANZAPINE 10 MG/1
TABLET ORAL
Qty: 30 TABLET | Refills: 0 | Status: SHIPPED | OUTPATIENT
Start: 2018-08-15 | End: 2018-12-04 | Stop reason: SDUPTHER

## 2018-10-17 RX ORDER — CARIPRAZINE 1.5 MG/1
CAPSULE, GELATIN COATED ORAL
Qty: 60 CAPSULE | Refills: 0 | OUTPATIENT
Start: 2018-10-17

## 2018-10-17 RX ORDER — OLANZAPINE 10 MG/1
TABLET ORAL
Qty: 30 TABLET | Refills: 0 | OUTPATIENT
Start: 2018-10-17

## 2018-10-25 RX ORDER — OLANZAPINE 10 MG/1
TABLET ORAL
Qty: 30 TABLET | Refills: 0 | OUTPATIENT
Start: 2018-10-25

## 2018-10-25 RX ORDER — OLANZAPINE 15 MG/1
15 TABLET ORAL NIGHTLY
Qty: 30 TABLET | Refills: 0 | Status: SHIPPED | OUTPATIENT
Start: 2018-10-25 | End: 2019-05-21 | Stop reason: ALTCHOICE

## 2018-10-25 NOTE — TELEPHONE ENCOUNTER
Called patient informed her she needed to schedule an appointment she said she would call back and make one

## 2018-10-25 NOTE — TELEPHONE ENCOUNTER
Let this patient know that I won't be able to extend additional prescriptions without first seeing her here in the office beyond this current prescription for one-month supply

## 2018-12-04 RX ORDER — OLANZAPINE 10 MG/1
TABLET ORAL
Qty: 30 TABLET | Refills: 0 | Status: SHIPPED | OUTPATIENT
Start: 2018-12-04 | End: 2019-02-12 | Stop reason: SDUPTHER

## 2018-12-04 RX ORDER — ESCITALOPRAM OXALATE 20 MG/1
TABLET ORAL
Qty: 46 TABLET | Refills: 0 | Status: SHIPPED | OUTPATIENT
Start: 2018-12-04 | End: 2019-02-12 | Stop reason: SDUPTHER

## 2018-12-04 NOTE — TELEPHONE ENCOUNTER
Once again let patient know that this will be the last prescription authorizes if she does not keep her scheduled appointment on December 11.

## 2018-12-04 NOTE — TELEPHONE ENCOUNTER
Patient requesting refills on medications.  She has not been seen since 04/2018 but has appointment scheduled for 12/11/18.

## 2019-01-17 RX ORDER — ESCITALOPRAM OXALATE 20 MG/1
TABLET ORAL
Qty: 46 TABLET | Refills: 0 | OUTPATIENT
Start: 2019-01-17

## 2019-01-17 RX ORDER — OLANZAPINE 10 MG/1
TABLET ORAL
Qty: 30 TABLET | Refills: 0 | OUTPATIENT
Start: 2019-01-17

## 2019-01-17 RX ORDER — OLANZAPINE 15 MG/1
15 TABLET ORAL NIGHTLY
Qty: 30 TABLET | Refills: 0 | OUTPATIENT
Start: 2019-01-17

## 2019-01-17 NOTE — TELEPHONE ENCOUNTER
It look like Dr. Barone has refused her request a few times as she has not kept her appointments. She will need to be seen. Thanks.

## 2019-01-17 NOTE — TELEPHONE ENCOUNTER
Dr. Barone has refused her requests due to patient not keeping her appointments. Patient needs to be seen for her medications to be continued. Thanks.

## 2019-01-18 RX ORDER — ESCITALOPRAM OXALATE 20 MG/1
TABLET ORAL
Qty: 46 TABLET | Refills: 0 | Status: CANCELLED | OUTPATIENT
Start: 2019-01-18

## 2019-01-18 RX ORDER — OLANZAPINE 10 MG/1
TABLET ORAL
Qty: 30 TABLET | Refills: 0 | Status: CANCELLED | OUTPATIENT
Start: 2019-01-18

## 2019-01-18 NOTE — TELEPHONE ENCOUNTER
Patient called for refills when you were out of the office. Other provider was not willing to refill because patient had not been seen since April 2018. Patient has an appt with you 02/11/2019

## 2019-01-29 RX ORDER — ESCITALOPRAM OXALATE 20 MG/1
TABLET ORAL
Qty: 46 TABLET | Refills: 0 | OUTPATIENT
Start: 2019-01-29

## 2019-01-29 RX ORDER — OLANZAPINE 10 MG/1
TABLET ORAL
Qty: 30 TABLET | Refills: 0 | OUTPATIENT
Start: 2019-01-29

## 2019-01-29 RX ORDER — OLANZAPINE 15 MG/1
15 TABLET ORAL NIGHTLY
Qty: 30 TABLET | Refills: 0 | OUTPATIENT
Start: 2019-01-29

## 2019-02-11 RX ORDER — ESCITALOPRAM OXALATE 20 MG/1
TABLET ORAL
Qty: 46 TABLET | Refills: 0 | OUTPATIENT
Start: 2019-02-11

## 2019-02-11 RX ORDER — OLANZAPINE 10 MG/1
TABLET ORAL
Qty: 30 TABLET | Refills: 0 | OUTPATIENT
Start: 2019-02-11

## 2019-02-11 NOTE — TELEPHONE ENCOUNTER
RX REQUEST PATIENT WAS R/S FROM TODAY TO Tuesday 2/11/19 BUT IS CURRENTLY OUT OF MEDICATION. PATIENT HAS BEEN TRANSFERRED TO [facility] PER DR. SIMMS

## 2019-02-11 NOTE — TELEPHONE ENCOUNTER
She has an appointment with me tomorrow, it appears that she has not been seen since April and Dr Barone has told her many time that she needs to be seen before she can have refills.  Thank you.

## 2019-02-12 ENCOUNTER — OFFICE VISIT (OUTPATIENT)
Dept: PSYCHIATRY | Facility: CLINIC | Age: 44
End: 2019-02-12

## 2019-02-12 ENCOUNTER — LAB (OUTPATIENT)
Dept: LAB | Facility: HOSPITAL | Age: 44
End: 2019-02-12

## 2019-02-12 VITALS
WEIGHT: 177.6 LBS | SYSTOLIC BLOOD PRESSURE: 131 MMHG | DIASTOLIC BLOOD PRESSURE: 85 MMHG | HEART RATE: 57 BPM | HEIGHT: 62 IN | BODY MASS INDEX: 32.68 KG/M2

## 2019-02-12 DIAGNOSIS — Z79.899 MEDICATION MANAGEMENT: ICD-10-CM

## 2019-02-12 DIAGNOSIS — Z79.899 HIGH RISK MEDICATION USE: ICD-10-CM

## 2019-02-12 DIAGNOSIS — F29 ATYPICAL PSYCHOSIS (HCC): ICD-10-CM

## 2019-02-12 DIAGNOSIS — F29 ATYPICAL PSYCHOSIS (HCC): Primary | ICD-10-CM

## 2019-02-12 LAB
ALBUMIN SERPL-MCNC: 4 G/DL (ref 3.5–5)
ALBUMIN/GLOB SERPL: 1.8 G/DL (ref 1.5–2.5)
ALP SERPL-CCNC: 72 U/L (ref 35–104)
ALT SERPL W P-5'-P-CCNC: 32 U/L (ref 10–36)
AMPHETAMINE CUT-OFF: ABNORMAL
ANION GAP SERPL CALCULATED.3IONS-SCNC: 3 MMOL/L (ref 3.6–11.2)
AST SERPL-CCNC: 33 U/L (ref 10–30)
BASOPHILS # BLD AUTO: 0.01 10*3/MM3 (ref 0–0.3)
BASOPHILS NFR BLD AUTO: 0.2 % (ref 0–2)
BENZODIAZIPINE CUT-OFF: ABNORMAL
BILIRUB SERPL-MCNC: 0.2 MG/DL (ref 0.2–1.8)
BUN BLD-MCNC: 9 MG/DL (ref 7–21)
BUN/CREAT SERPL: 14.5 (ref 7–25)
BUPRENORPHINE CUT-OFF: ABNORMAL
CALCIUM SPEC-SCNC: 8.6 MG/DL (ref 7.7–10)
CHLORIDE SERPL-SCNC: 109 MMOL/L (ref 99–112)
CHOLEST SERPL-MCNC: 95 MG/DL (ref 0–200)
CO2 SERPL-SCNC: 27 MMOL/L (ref 24.3–31.9)
COCAINE CUT-OFF: ABNORMAL
CREAT BLD-MCNC: 0.62 MG/DL (ref 0.43–1.29)
DEPRECATED RDW RBC AUTO: 47.3 FL (ref 37–54)
EOSINOPHIL # BLD AUTO: 0 10*3/MM3 (ref 0–0.7)
EOSINOPHIL NFR BLD AUTO: 0 % (ref 0–5)
ERYTHROCYTE [DISTWIDTH] IN BLOOD BY AUTOMATED COUNT: 15.2 % (ref 11.5–14.5)
EXTERNAL AMPHETAMINE SCREEN URINE: POSITIVE
EXTERNAL BENZODIAZEPINE SCREEN URINE: NEGATIVE
EXTERNAL BUPRENORPHINE SCREEN URINE: POSITIVE
EXTERNAL COCAINE SCREEN URINE: NEGATIVE
EXTERNAL MDMA: NEGATIVE
EXTERNAL METHADONE SCREEN URINE: NEGATIVE
EXTERNAL METHAMPHETAMINE SCREEN URINE: POSITIVE
EXTERNAL OPIATES SCREEN URINE: NEGATIVE
EXTERNAL OXYCODONE SCREEN URINE: NEGATIVE
EXTERNAL THC SCREEN URINE: NEGATIVE
GFR SERPL CREATININE-BSD FRML MDRD: 105 ML/MIN/1.73
GLOBULIN UR ELPH-MCNC: 2.2 GM/DL
GLUCOSE BLD-MCNC: 79 MG/DL (ref 70–110)
HBA1C MFR BLD: 5.9 % (ref 4.5–5.7)
HCT VFR BLD AUTO: 34.7 % (ref 37–47)
HDLC SERPL-MCNC: 50 MG/DL (ref 60–100)
HGB BLD-MCNC: 10.9 G/DL (ref 12–16)
IMM GRANULOCYTES # BLD AUTO: 0.01 10*3/MM3 (ref 0–0.03)
IMM GRANULOCYTES NFR BLD AUTO: 0.2 % (ref 0–0.5)
LDLC SERPL CALC-MCNC: 30 MG/DL (ref 0–100)
LDLC/HDLC SERPL: 0.6 {RATIO}
LYMPHOCYTES # BLD AUTO: 1.94 10*3/MM3 (ref 1–3)
LYMPHOCYTES NFR BLD AUTO: 35.1 % (ref 21–51)
MCH RBC QN AUTO: 26.4 PG (ref 27–33)
MCHC RBC AUTO-ENTMCNC: 31.4 G/DL (ref 33–37)
MCV RBC AUTO: 84 FL (ref 80–94)
MDMA CUT-OFF: ABNORMAL
METHADONE CUT-OFF: ABNORMAL
METHAMPHETAMINE CUT-OFF: ABNORMAL
MONOCYTES # BLD AUTO: 0.4 10*3/MM3 (ref 0.1–0.9)
MONOCYTES NFR BLD AUTO: 7.2 % (ref 0–10)
NEUTROPHILS # BLD AUTO: 3.17 10*3/MM3 (ref 1.4–6.5)
NEUTROPHILS NFR BLD AUTO: 57.3 % (ref 30–70)
OPIATES CUT-OFF: ABNORMAL
OSMOLALITY SERPL CALC.SUM OF ELEC: 275.1 MOSM/KG (ref 273–305)
OXYCODONE CUT-OFF: ABNORMAL
PLATELET # BLD AUTO: 222 10*3/MM3 (ref 130–400)
PMV BLD AUTO: 11 FL (ref 6–10)
POTASSIUM BLD-SCNC: 4 MMOL/L (ref 3.5–5.3)
PROT SERPL-MCNC: 6.2 G/DL (ref 6–8)
RBC # BLD AUTO: 4.13 10*6/MM3 (ref 4.2–5.4)
SODIUM BLD-SCNC: 139 MMOL/L (ref 135–153)
THC CUT-OFF: ABNORMAL
TRIGL SERPL-MCNC: 74 MG/DL (ref 0–150)
VLDLC SERPL-MCNC: 14.8 MG/DL
WBC NRBC COR # BLD: 5.53 10*3/MM3 (ref 4.5–12.5)

## 2019-02-12 PROCEDURE — 36415 COLL VENOUS BLD VENIPUNCTURE: CPT

## 2019-02-12 PROCEDURE — 83036 HEMOGLOBIN GLYCOSYLATED A1C: CPT

## 2019-02-12 PROCEDURE — 99213 OFFICE O/P EST LOW 20 MIN: CPT | Performed by: NURSE PRACTITIONER

## 2019-02-12 PROCEDURE — 85025 COMPLETE CBC W/AUTO DIFF WBC: CPT

## 2019-02-12 PROCEDURE — 80061 LIPID PANEL: CPT

## 2019-02-12 PROCEDURE — 80053 COMPREHEN METABOLIC PANEL: CPT

## 2019-02-12 RX ORDER — OLANZAPINE 10 MG/1
TABLET ORAL
Qty: 30 TABLET | Refills: 2 | Status: SHIPPED | OUTPATIENT
Start: 2019-02-12 | End: 2019-05-21

## 2019-02-12 RX ORDER — NALOXONE HYDROCHLORIDE 4 MG/.1ML
SPRAY NASAL
Status: ON HOLD | COMMUNITY
End: 2019-10-01

## 2019-02-12 RX ORDER — ESCITALOPRAM OXALATE 20 MG/1
TABLET ORAL
Qty: 46 TABLET | Refills: 2 | Status: SHIPPED | OUTPATIENT
Start: 2019-02-12 | End: 2019-05-21 | Stop reason: ALTCHOICE

## 2019-02-12 NOTE — PROGRESS NOTES
Please have patient follow-up with her primary care provider regarding her abnormal labs. Thank you.

## 2019-02-12 NOTE — PROGRESS NOTES
"    Chidi Valle is a 43 y.o. female is here today for medication management follow-up.    Chief Complaint: f/u Atypical Psychosis     History of Present Illness  Patient presents to the clinic for follow-up. Patient has difficulty keeping appointments and reports she has difficulty with transportation. She reports mood stable. She denies depression and anxiety. She denies suicidal and homicidal ideations. She denies auditory and visual hallucinations. She reports tolerating medications without side effects. Patient is agreeable to have labs done today. She reports she has not eaten yet today. She reports sleep and appetite are good. She denies any other issues or concerns.       The following portions of the patient's history were reviewed and updated as appropriate: allergies, current medications, past family history, past medical history, past social history, past surgical history and problem list.    Review of Systems   Constitutional: Negative.    HENT: Negative.    Eyes: Negative.    Respiratory: Negative.    Cardiovascular: Negative.    Gastrointestinal: Negative.    Endocrine: Negative.    Genitourinary: Negative.    Musculoskeletal: Positive for back pain.   Skin: Negative.    Allergic/Immunologic: Negative.    Neurological: Negative.    Hematological: Negative.    Psychiatric/Behavioral: Negative.        Objective   Physical Exam   Constitutional: She appears well-developed and well-nourished. No distress.   Vitals reviewed.    Blood pressure 131/85, pulse 57, height 157 cm (61.81\"), weight 80.6 kg (177 lb 9.6 oz).    Medication List:   Current Outpatient Medications   Medication Sig Dispense Refill   • atenolol (TENORMIN) 50 MG tablet Daily.  5   • Cariprazine HCl (VRAYLAR) 1.5 MG capsule capsule Take 2 daily 60 capsule 2   • escitalopram (LEXAPRO) 20 MG tablet One and one half daily 46 tablet 2   • fluticasone (FLONASE) 50 MCG/ACT nasal spray   2   • gabapentin (NEURONTIN) 600 MG tablet 3 " (Three) Times a Day.  0   • ibuprofen (ADVIL,MOTRIN) 600 MG tablet      • metFORMIN (GLUCOPHAGE) 500 MG tablet      • naloxone (NARCAN) 4 MG/0.1ML nasal spray Narcan 4 mg/actuation nasal spray     • naproxen (NAPROSYN) 500 MG tablet Daily.  0   • OLANZapine (ZYPREXA) 10 MG tablet Take 1 daily@8 PM along with 5 mg Zyprexa, total daily dose 15 mg. 30 tablet 2   • psyllium (METAMUCIL) 58.6 % packet Take 1 packet by mouth Daily.     • SUBOXONE 8-2 MG film film      • OLANZapine (ZYPREXA) 15 MG tablet Take 1 tablet by mouth Every Night. 30 tablet 0     No current facility-administered medications for this visit.        Labs:   Recent Results (from the past 2016 hour(s))   [facility] Drug Screen    Collection Time: 02/12/19 10:39 AM   Result Value Ref Range    External Amphetamine Screen Urine Positive     Amphetamine Cut-Off 1000mg/ml     External Benzodiazepine Screen Urine Negative     Benzodiazipine Cut-Off 300mg/ml     External Cocaine Screen Urine Negative     Cocaine Cut-Off 300mg/ml     External THC Screen Urine Negative     THC Cut-Off 50mg/ml     External Methadone Screen Urine Negative     Methadone Cut-Off 300mg/ml     External Methamphetamine Screen Urine Positive     Methamphetamine Cut-Off 1000mg/ml     External Oxycodone Screen Urine Negative     Oxycodone Cut-Off 100mg/ml     External Buprenorphine Screen Urine Positive     Buprenorphine Cut-Off 10mg/ml     External MDMA Negative     MDMA Cut-Off 500mg/ml     External Opiates Screen Urine Negative     Opiates Cut-Off 300mg/ml          Mental Status Exam:   Hygiene:   poor  Cooperation:  Cooperative  Eye Contact:  Good  Psychomotor Behavior:  Appropriate  Affect:  Appropriate  Hopelessness: Denies  Speech:  Minimal  Thought Process:  Goal directed and Linear  Thought Content:  Normal  Suicidal:  None  Homicidal:  None  Hallucinations:  None  Delusion:  None  Memory:  Intact  Orientation:  Person, Place, Time and Situation  Reliability:  fair  Insight:   Fair  Judgement:  Fair  Impulse Control:  Fair  Physical/Medical Issues:  Yes Chronic pain     Assessment/Plan   Diagnoses and all orders for this visit:    Atypical psychosis (CMS/HCC)  -     CBC & Differential; Future  -     Hemoglobin A1c; Future  -     Comprehensive Metabolic Panel; Future  -     Lipid Panel; Future    Medication management  -     [facility] Drug Screen    High risk medication use  -     CBC & Differential; Future  -     Hemoglobin A1c; Future  -     Comprehensive Metabolic Panel; Future  -     Lipid Panel; Future    Other orders  -     Cariprazine HCl (VRAYLAR) 1.5 MG capsule capsule; Take 2 daily  -     escitalopram (LEXAPRO) 20 MG tablet; One and one half daily  -     OLANZapine (ZYPREXA) 10 MG tablet; Take 1 daily@8 PM along with 5 mg Zyprexa, total daily dose 15 mg.        UDS  Positive for methamphetamines and amphetamines    Body mass index is 32.68 kg/m².  Patient was educated on healthier and more balanced diet choices and encouraged exercise within physical limitations.  Functionality: pt appears to be at baseline in important areas of daily functioning.  Prognosis: Guarded dependent on medication/follow up and treatment plan compliance.    Impression: Patient mood stable and symptoms currently managed per patient.     Plan:  1. Continue Vraylar 1.5 mg two capsules po daily for mood and psychosis  2. Continue Zyprexa 15 mg po at night for mood and psychosis  3. Continue Lexapro 20 mg one and one-half tablet po daily for depression and anxiety.   4. RTC in 3 months per patient request due to transportation issues.  5. Will obtain fasting labs due to antipsychotic use.       Discussed medication options.  Reviewed the risks, benefits, and side effects of the medications; patient acknowledged and verbally consented.  Patient is agreeable to call the [facility].  Patient is aware to call 911 or go to the nearest ER should begin having SI/HI.

## 2019-02-13 ENCOUNTER — TELEPHONE (OUTPATIENT)
Dept: PSYCHIATRY | Facility: CLINIC | Age: 44
End: 2019-02-13

## 2019-02-13 NOTE — TELEPHONE ENCOUNTER
----- Message from RICARDO Cárdenas sent at 2/12/2019  3:17 PM EST -----  Please have patient follow-up with her primary care provider regarding her abnormal labs. Thank you.

## 2019-05-15 ENCOUNTER — TELEPHONE (OUTPATIENT)
Dept: PSYCHIATRY | Facility: CLINIC | Age: 44
End: 2019-05-15

## 2019-05-15 NOTE — TELEPHONE ENCOUNTER
Called patient's father back, he is going to have patient make a follow-up appointment with you. He stated he is going to bring her to the ER when he picks her up this afternoon and I let him know to make sure he expressed his concerns with staff so they can address issues. He verbalized understanding.

## 2019-05-21 ENCOUNTER — OFFICE VISIT (OUTPATIENT)
Dept: PSYCHIATRY | Facility: CLINIC | Age: 44
End: 2019-05-21

## 2019-05-21 VITALS
WEIGHT: 169 LBS | HEART RATE: 86 BPM | DIASTOLIC BLOOD PRESSURE: 85 MMHG | SYSTOLIC BLOOD PRESSURE: 129 MMHG | HEIGHT: 62 IN | BODY MASS INDEX: 31.1 KG/M2

## 2019-05-21 DIAGNOSIS — F33.1 MODERATE EPISODE OF RECURRENT MAJOR DEPRESSIVE DISORDER (HCC): Primary | ICD-10-CM

## 2019-05-21 DIAGNOSIS — F29 ATYPICAL PSYCHOSIS (HCC): ICD-10-CM

## 2019-05-21 PROCEDURE — 99214 OFFICE O/P EST MOD 30 MIN: CPT | Performed by: NURSE PRACTITIONER

## 2019-05-21 RX ORDER — RISPERIDONE 0.5 MG/1
0.5 TABLET ORAL 2 TIMES DAILY
Qty: 60 TABLET | Refills: 0 | Status: SHIPPED | OUTPATIENT
Start: 2019-05-21 | End: 2019-06-13 | Stop reason: SDUPTHER

## 2019-05-21 RX ORDER — RISPERIDONE 0.5 MG/1
TABLET ORAL
COMMUNITY
Start: 2019-05-15 | End: 2019-05-21 | Stop reason: SDUPTHER

## 2019-05-21 RX ORDER — ESCITALOPRAM OXALATE 10 MG/1
10 TABLET ORAL DAILY
Qty: 30 TABLET | Refills: 0 | Status: SHIPPED | OUTPATIENT
Start: 2019-05-21 | End: 2019-08-01 | Stop reason: SDUPTHER

## 2019-05-23 ENCOUNTER — OFFICE VISIT (OUTPATIENT)
Dept: RETAIL CLINIC | Facility: CLINIC | Age: 44
End: 2019-05-23

## 2019-05-23 VITALS
HEART RATE: 82 BPM | OXYGEN SATURATION: 98 % | WEIGHT: 164 LBS | SYSTOLIC BLOOD PRESSURE: 118 MMHG | TEMPERATURE: 98.2 F | RESPIRATION RATE: 17 BRPM | BODY MASS INDEX: 30.18 KG/M2 | DIASTOLIC BLOOD PRESSURE: 91 MMHG

## 2019-05-23 DIAGNOSIS — R39.11 URINARY HESITANCY: Primary | ICD-10-CM

## 2019-05-23 DIAGNOSIS — J30.9 ALLERGIC RHINITIS, UNSPECIFIED SEASONALITY, UNSPECIFIED TRIGGER: ICD-10-CM

## 2019-05-23 LAB
BILIRUB BLD-MCNC: NEGATIVE MG/DL
CLARITY, POC: CLEAR
COLOR UR: YELLOW
GLUCOSE UR STRIP-MCNC: NEGATIVE MG/DL
KETONES UR QL: NEGATIVE
LEUKOCYTE EST, POC: NEGATIVE
NITRITE UR-MCNC: NEGATIVE MG/ML
PH UR: 6 [PH] (ref 5–8)
PROT UR STRIP-MCNC: NEGATIVE MG/DL
RBC # UR STRIP: NEGATIVE /UL
SP GR UR: 1.01 (ref 1–1.03)
UROBILINOGEN UR QL: NORMAL

## 2019-05-23 PROCEDURE — 99213 OFFICE O/P EST LOW 20 MIN: CPT | Performed by: NURSE PRACTITIONER

## 2019-05-23 PROCEDURE — 81003 URINALYSIS AUTO W/O SCOPE: CPT | Performed by: NURSE PRACTITIONER

## 2019-05-23 RX ORDER — LORATADINE 10 MG/1
10 TABLET ORAL DAILY
Qty: 14 TABLET | Refills: 0 | Status: SHIPPED | OUTPATIENT
Start: 2019-05-23 | End: 2019-06-06

## 2019-05-23 RX ORDER — FLUTICASONE PROPIONATE 50 MCG
1 SPRAY, SUSPENSION (ML) NASAL DAILY
Qty: 1 BOTTLE | Refills: 0 | Status: SHIPPED | OUTPATIENT
Start: 2019-05-23 | End: 2019-06-06

## 2019-05-23 NOTE — PROGRESS NOTES
Urinary Tract Infection and Allergies      Subjective   Jazzy Valle is a 43 y.o. female.     Urinary Tract Infection    This is a new problem. The current episode started more than 1 month ago. The problem occurs intermittently. The problem has been unchanged. The patient is experiencing no pain. There has been no fever. Sexually active: denies. History of pyelonephritis: denies. Associated symptoms include hesitancy. Pertinent negatives include no chills, discharge, flank pain, frequency, hematuria, nausea, possible pregnancy, sweats, urgency or vomiting. She has tried nothing for the symptoms. The treatment provided no relief. There is no history of catheterization, kidney stones, recurrent UTIs, a single kidney, urinary stasis or a urological procedure.   Allergies   This is a new problem. The current episode started more than 1 month ago. The problem occurs intermittently. The problem has been unchanged. Associated symptoms include congestion and headaches. Pertinent negatives include no chest pain, chills, coughing, fatigue, fever, nausea, numbness, rash, sore throat, urinary symptoms or vomiting. Nothing aggravates the symptoms. She has tried nothing for the symptoms.      Patient reports intermittent urinary hesitancy x >1 month with no other urinary complaints; she believes she has a UTI and would like to be tested.  Patient also reports allergy symptoms: sneezing, runny nose, nasal congestion x 1 month and states she took Claritin in the past that usually worked, however she has not tried that this episode.  Patient Active Problem List   Diagnosis   • Essential hypertension   • Obesity (BMI 30-39.9)   • Tobacco abuse   • Chest pressure       No Known Allergies     Current Outpatient Medications on File Prior to Visit   Medication Sig Dispense Refill   • atenolol (TENORMIN) 50 MG tablet Daily.  5   • escitalopram (LEXAPRO) 10 MG tablet Take 1 tablet by mouth Daily. 30 tablet 0   • gabapentin (NEURONTIN)  600 MG tablet 3 (Three) Times a Day.  0   • ibuprofen (ADVIL,MOTRIN) 600 MG tablet      • metFORMIN (GLUCOPHAGE) 500 MG tablet      • risperiDONE (risperDAL) 0.5 MG tablet Take 1 tablet by mouth 2 (Two) Times a Day. 60 tablet 0   • naloxone (NARCAN) 4 MG/0.1ML nasal spray Narcan 4 mg/actuation nasal spray     • naproxen (NAPROSYN) 500 MG tablet Daily.  0   • psyllium (METAMUCIL) 58.6 % packet Take 1 packet by mouth Daily.     • [DISCONTINUED] fluticasone (FLONASE) 50 MCG/ACT nasal spray   2     No current facility-administered medications on file prior to visit.        Past Medical History:   Diagnosis Date   • Anxiety    • Depression    • PCO (polycystic ovaries)    • Substance abuse (CMS/HCC)    • Urinary tract infection        Past Surgical History:   Procedure Laterality Date   • BACK SURGERY     • KNEE ARTHROSCOPY         Family History   Family history unknown: Yes       Social History     Socioeconomic History   • Marital status: Single     Spouse name: Not on file   • Number of children: Not on file   • Years of education: Not on file   • Highest education level: Not on file   Tobacco Use   • Smoking status: Current Every Day Smoker     Packs/day: 1.00     Years: 14.00     Pack years: 14.00     Types: Cigarettes   • Smokeless tobacco: Never Used   Substance and Sexual Activity   • Alcohol use: No   • Drug use: No   • Sexual activity: Defer       Travel:  No recent travel within the last 21 days outside the [place] Denies recent travel to one of the following [place]ries:  [place], [place], [place], or [place].  Denies contact with anyone who has traveled to one of the following [place]  Countries: [place], [place], [place], or [place] within the last 21 days and is known or suspected to have Ebola.  Denies having had any contact with the human remains, blood or any bodily fluids of someone who is known or suspected to have Ebola within the last 21 days.     OB History     No data available           Review of Systems   Constitutional: Negative for chills, fatigue and fever.   HENT: Positive for congestion, rhinorrhea and sneezing. Negative for ear discharge, sinus pressure, sinus pain, sore throat, tinnitus and trouble swallowing.    Eyes: Negative.    Respiratory: Negative for cough, chest tightness, shortness of breath and wheezing.    Cardiovascular: Negative for chest pain.   Gastrointestinal: Negative for nausea and vomiting.   Genitourinary: Positive for difficulty urinating (hesitancy) and hesitancy. Negative for decreased urine volume, dysuria, flank pain, frequency, hematuria and urgency.   Musculoskeletal: Negative.    Skin: Negative.  Negative for rash.   Neurological: Positive for headaches. Negative for dizziness, light-headedness and numbness.   Psychiatric/Behavioral: Negative.        /91   Pulse 82   Temp 98.2 °F (36.8 °C) (Temporal)   Resp 17   Wt 74.4 kg (164 lb)   SpO2 98%   Breastfeeding? No   BMI 30.18 kg/m²     Objective   Physical Exam   Constitutional: She is oriented to person, place, and time. She appears well-developed and well-nourished. No distress.   HENT:   Head: Normocephalic and atraumatic.   Right Ear: Hearing, external ear and ear canal normal. No swelling or tenderness. Tympanic membrane is erythematous. A middle ear effusion (clear) is present.   Left Ear: Hearing, external ear and ear canal normal. No swelling or tenderness. Tympanic membrane is erythematous. A middle ear effusion (clear) is present.   Nose: Mucosal edema and rhinorrhea present. No sinus tenderness. Right sinus exhibits no maxillary sinus tenderness and no frontal sinus tenderness. Left sinus exhibits no maxillary sinus tenderness and no frontal sinus tenderness.   Mouth/Throat: Uvula is midline and mucous membranes are normal. Posterior oropharyngeal erythema present. No posterior oropharyngeal edema or tonsillar abscesses.   Eyes: Conjunctivae and EOM are normal. Pupils are equal,  round, and reactive to light. Right eye exhibits no discharge. Left eye exhibits no discharge. No scleral icterus.   Neck: Normal range of motion. Neck supple. No thyromegaly present.   Cardiovascular: Normal rate, regular rhythm and normal heart sounds.   Pulmonary/Chest: Effort normal and breath sounds normal.   Abdominal: Soft. Bowel sounds are normal. She exhibits no distension and no mass. There is no tenderness. There is no guarding.   Musculoskeletal: Normal range of motion.   Lymphadenopathy:     She has no cervical adenopathy.   Neurological: She is alert and oriented to person, place, and time.   Skin: Skin is warm and dry. No rash noted. She is not diaphoretic.   Psychiatric: She has a normal mood and affect. Her behavior is normal.       Assessment/Plan   Jazzy was seen today for urinary tract infection and allergies.    Diagnoses and all orders for this visit:    Urinary hesitancy  -     POC Urinalysis Dipstick, Automated    Allergic rhinitis, unspecified seasonality, unspecified trigger  -     fluticasone (FLONASE) 50 MCG/ACT nasal spray; 1 spray into the nostril(s) as directed by provider Daily for 14 days.  -     loratadine (CLARITIN) 10 MG tablet; Take 1 tablet by mouth Daily for 14 days.    Discussed UA results with patient and instructed her to call her PCP in 1-2 days for appt. regarding her urinary hesitancy. Patient verbalized understanding and is agreeable to this.   Discussed allergy symptoms and symptoms control, use medications as directed; increase fluid intake; rest; Tylenol or Ibuprofen for headaches; follow up with PCP for no improvement or new/worsening symptoms. Verbalized understanding.    Results for orders placed or performed in visit on 05/23/19   POC Urinalysis Dipstick, Automated   Result Value Ref Range    Color Yellow Yellow, Straw, Dark Yellow, Jamila    Clarity, UA Clear Clear    Specific Gravity  1.015 1.005 - 1.030    pH, Urine 6.0 5.0 - 8.0    Leukocytes Negative Negative     Nitrite, UA Negative Negative    Protein, POC Negative Negative mg/dL    Glucose, UA Negative Negative, 1000 mg/dL (3+) mg/dL    Ketones, UA Negative Negative    Urobilinogen, UA Normal Normal    Bilirubin Negative Negative    Blood, UA Negative Negative       No Follow-up on file.

## 2019-05-23 NOTE — PATIENT INSTRUCTIONS
Allergic Rhinitis, Adult  Allergic rhinitis is an allergic reaction that affects the mucous membrane inside the nose. It causes sneezing, a runny or stuffy nose, and the feeling of mucus going down the back of the throat (postnasal drip). Allergic rhinitis can be mild to severe.  There are two types of allergic rhinitis:  · Seasonal. This type is also called hay fever. It happens only during certain seasons.  · Perennial. This type can happen at any time of the year.    What are the causes?  This condition happens when the body's defense system (immune system) responds to certain harmless substances called allergens as though they were germs.   Seasonal allergic rhinitis is triggered by pollen, which can come from grasses, trees, and weeds. Perennial allergic rhinitis may be caused by:  · House dust mites.  · Pet dander.  · Mold spores.    What are the signs or symptoms?  Symptoms of this condition include:  · Sneezing.  · Runny or stuffy nose (nasal congestion).  · Postnasal drip.  · Itchy nose.  · Tearing of the eyes.  · Trouble sleeping.  · Daytime sleepiness.    How is this diagnosed?  This condition may be diagnosed based on:  · Your medical history.  · A physical exam.  · Tests to check for related conditions, such as:  ? Asthma.  ? Pink eye.  ? Ear infection.  ? Upper respiratory infection.  · Tests to find out which allergens trigger your symptoms. These may include skin or blood tests.    How is this treated?  There is no cure for this condition, but treatment can help control symptoms. Treatment may include:  · Taking medicines that block allergy symptoms, such as antihistamines. Medicine may be given as a shot, nasal spray, or pill.  · Avoiding the allergen.  · Desensitization. This treatment involves getting ongoing shots until your body becomes less sensitive to the allergen. This treatment may be done if other treatments do not help.  · If taking medicine and avoiding the allergen does not work, new,  stronger medicines may be prescribed.    Follow these instructions at home:  · Find out what you are allergic to. Common allergens include smoke, dust, and pollen.  · Avoid the things you are allergic to. These are some things you can do to help avoid allergens:  ? Replace carpet with wood, tile, or vinyl jaden. Carpet can trap dander and dust.  ? Do not smoke. Do not allow smoking in your home.  ? Change your heating and air conditioning filter at least once a month.  ? During allergy season:  § Keep windows closed as much as possible.  § Plan outdoor activities when pollen counts are lowest. This is usually during the evening hours.  § When coming indoors, change clothing and shower before sitting on furniture or bedding.  · Take over-the-counter and prescription medicines only as told by your health care provider.  · Keep all follow-up visits as told by your health care provider. This is important.  Contact a health care provider if:  · You have a fever.  · You develop a persistent cough.  · You make whistling sounds when you breathe (you wheeze).  · Your symptoms interfere with your normal daily activities.  Get help right away if:  · You have shortness of breath.  Summary  · This condition can be managed by taking medicines as directed and avoiding allergens.  · Contact your health care provider if you develop a persistent cough or fever.  · During allergy season, keep windows closed as much as possible.  This information is not intended to replace advice given to you by your health care provider. Make sure you discuss any questions you have with your health care provider.  Document Released: 09/12/2002 Document Revised: 01/25/2018 Document Reviewed: 01/25/2018  Elsevier Interactive Patient Education © 2019 [employer]

## 2019-05-24 ENCOUNTER — HOSPITAL ENCOUNTER (EMERGENCY)
Facility: HOSPITAL | Age: 44
Discharge: LEFT BEFORE DISCHARGE | End: 2019-05-24

## 2019-05-24 VITALS
HEIGHT: 63 IN | TEMPERATURE: 97.8 F | SYSTOLIC BLOOD PRESSURE: 131 MMHG | BODY MASS INDEX: 29.77 KG/M2 | WEIGHT: 168 LBS | HEART RATE: 86 BPM | DIASTOLIC BLOOD PRESSURE: 83 MMHG | RESPIRATION RATE: 18 BRPM | OXYGEN SATURATION: 100 %

## 2019-06-13 ENCOUNTER — OFFICE VISIT (OUTPATIENT)
Dept: PSYCHIATRY | Facility: CLINIC | Age: 44
End: 2019-06-13

## 2019-06-13 VITALS
HEIGHT: 62 IN | HEART RATE: 76 BPM | DIASTOLIC BLOOD PRESSURE: 81 MMHG | SYSTOLIC BLOOD PRESSURE: 117 MMHG | BODY MASS INDEX: 31.03 KG/M2 | WEIGHT: 168.6 LBS

## 2019-06-13 DIAGNOSIS — F33.1 MODERATE EPISODE OF RECURRENT MAJOR DEPRESSIVE DISORDER (HCC): ICD-10-CM

## 2019-06-13 DIAGNOSIS — F29 ATYPICAL PSYCHOSIS (HCC): Primary | ICD-10-CM

## 2019-06-13 DIAGNOSIS — F15.10 METHAMPHETAMINE ABUSE (HCC): ICD-10-CM

## 2019-06-13 DIAGNOSIS — Z79.899 MEDICATION MANAGEMENT: ICD-10-CM

## 2019-06-13 LAB
AMPHETAMINE CUT-OFF: ABNORMAL
BENZODIAZIPINE CUT-OFF: ABNORMAL
BUPRENORPHINE CUT-OFF: ABNORMAL
COCAINE CUT-OFF: ABNORMAL
EXTERNAL AMPHETAMINE SCREEN URINE: NEGATIVE
EXTERNAL BENZODIAZEPINE SCREEN URINE: NEGATIVE
EXTERNAL BUPRENORPHINE SCREEN URINE: NEGATIVE
EXTERNAL COCAINE SCREEN URINE: NEGATIVE
EXTERNAL MDMA: NEGATIVE
EXTERNAL METHADONE SCREEN URINE: NEGATIVE
EXTERNAL METHAMPHETAMINE SCREEN URINE: POSITIVE
EXTERNAL OPIATES SCREEN URINE: NEGATIVE
EXTERNAL OXYCODONE SCREEN URINE: NEGATIVE
EXTERNAL THC SCREEN URINE: NEGATIVE
MDMA CUT-OFF: ABNORMAL
METHADONE CUT-OFF: ABNORMAL
METHAMPHETAMINE CUT-OFF: ABNORMAL
OPIATES CUT-OFF: ABNORMAL
OXYCODONE CUT-OFF: ABNORMAL
THC CUT-OFF: ABNORMAL

## 2019-06-13 PROCEDURE — 99214 OFFICE O/P EST MOD 30 MIN: CPT | Performed by: NURSE PRACTITIONER

## 2019-06-13 RX ORDER — RISPERIDONE 1 MG/1
1 TABLET ORAL 2 TIMES DAILY
Qty: 60 TABLET | Refills: 0 | Status: SHIPPED | OUTPATIENT
Start: 2019-06-13 | End: 2019-08-01 | Stop reason: SDUPTHER

## 2019-06-13 NOTE — PROGRESS NOTES
Subjective   Jazzy Valle is a 43 y.o. female who is here today for medication management     Chief Complaint:  Atypical psychosis     HPI:  History of Present Illness   Patient presents for a work in appointment today. Patient doesn't feel her symptoms are worse but she ran across an article on the internet ([employer], [employer], and [employer]). She feels she may have one of these implants and would like to have a CT scan done. She called her PCP office and they instructed her to follow-up with undersigned. Patient's UDS positive for methamphetamine. She reports it has been a week since she used Suboxone and methamphetamine. She reports she did take Adipex. Patient reports auditory hallucinations, she is not able to make out what they are saying. She denies visual hallucinations. She denies suicidal and homicidal ideations. She reports compliance with medications and tolerating without side effects. She reports Lexapro helped with her depression. Patient is willing to try an increase in her medication.     Family Psychiatric History:  Family history is unknown by patient.    Medical/Surgical History:  Past Medical History:   Diagnosis Date   • Anxiety    • Depression    • PCO (polycystic ovaries)    • Substance abuse (CMS/HCC)    • Urinary tract infection      Past Surgical History:   Procedure Laterality Date   • BACK SURGERY     • KNEE ARTHROSCOPY         No Known Allergies        Current Medications:   Current Outpatient Medications   Medication Sig Dispense Refill   • atenolol (TENORMIN) 50 MG tablet Daily.  5   • escitalopram (LEXAPRO) 10 MG tablet Take 1 tablet by mouth Daily. 30 tablet 0   • gabapentin (NEURONTIN) 600 MG tablet 3 (Three) Times a Day.  0   • ibuprofen (ADVIL,MOTRIN) 600 MG tablet      • metFORMIN (GLUCOPHAGE) 500 MG tablet      • naloxone (NARCAN) 4 MG/0.1ML nasal spray Narcan 4 mg/actuation nasal spray     • naproxen (NAPROSYN) 500 MG tablet Daily.  0   • psyllium (METAMUCIL)  "58.6 % packet Take 1 packet by mouth Daily.     • risperiDONE (risperDAL) 1 MG tablet Take 1 tablet by mouth 2 (Two) Times a Day. 60 tablet 0     No current facility-administered medications for this visit.          Review of Systems   Constitutional: Positive for fatigue.   HENT: Negative.    Eyes: Negative.    Respiratory: Negative.    Cardiovascular: Negative.    Gastrointestinal: Negative.    Endocrine: Negative.    Genitourinary: Negative.    Musculoskeletal: Positive for arthralgias and myalgias.   Skin: Negative.    Allergic/Immunologic: Negative.    Neurological: Negative.    Hematological: Negative.    Psychiatric/Behavioral: Positive for decreased concentration.    denies HEENT, cardiovascular, respiratory, liver, renal, GI/, endocrine, neuro, DERM, hematology, immunology, musculoskeletal disorders.    Objective   Physical Exam   Constitutional: She appears well-developed and well-nourished. No distress.   Vitals reviewed.    Blood pressure 117/81, pulse 76, height 157 cm (61.81\"), weight 76.5 kg (168 lb 9.6 oz), not currently breastfeeding.    Mental Status Exam:   Hygiene:   poor  Cooperation:  Cooperative  Eye Contact:  Good  Psychomotor Behavior:  Appropriate. No abnormal movements, EPS or TD noted.  Affect:  Appropriate  Hopelessness: Denies  Speech:Normal  Thought Process:  Goal directed and Linear  Thought Content:  Bizarre   Suicidal:  None  Homicidal:  None  Hallucinations:  None  Delusion:  Paranoid   Memory:  Intact  Orientation:  Person, Place, Time and Situation  Reliability:  fair  Insight:  Fair  Judgement:  Fair  Impulse Control:  Fair  Physical/Medical Issues:  Yes Chronic pain         Assessment/Plan   Diagnoses and all orders for this visit:    Atypical psychosis (CMS/HCC)  -     risperiDONE (risperDAL) 1 MG tablet; Take 1 tablet by mouth 2 (Two) Times a Day.    Methamphetamine abuse (CMS/HCC)    Medication management  -     [facility] Drug Screen    Moderate episode of recurrent major " depressive disorder (CMS/HCC)  -     risperiDONE (risperDAL) 1 MG tablet; Take 1 tablet by mouth 2 (Two) Times a Day.      Body mass index is 31.03 kg/m².  Patient was educated on healthier and more balanced diet choices and encouraged exercise within physical limitations.  Functionality: pt having impairment in important areas of daily functioning.  Prognosis: Guarded dependent on medication/follow up and treatment plan compliance.    Impression: Patient experiencing an increase in paranoia since last visit.     Plan:  1. Increase Risperidone 0.5 mg po BID for psychosis and paranoia  2. Continue Lexapro 10 mg po daily. Patient instructed to take one-half tablet daily for 7 days then may increase to one tablet daily.  3. RTC in 2 weeks     Discussed medication options. Discussed the risks, benefits, and side effects of the medication; client acknowledged and verbally consented.  Patient is aware to contact the [facility] with any worsening of symptom.  Patient is agreeable to go to the ER or call 911 should they begin SI/HI.

## 2019-08-01 ENCOUNTER — OFFICE VISIT (OUTPATIENT)
Dept: PSYCHIATRY | Facility: CLINIC | Age: 44
End: 2019-08-01

## 2019-08-01 VITALS
BODY MASS INDEX: 31.28 KG/M2 | DIASTOLIC BLOOD PRESSURE: 79 MMHG | WEIGHT: 170 LBS | HEIGHT: 62 IN | HEART RATE: 114 BPM | SYSTOLIC BLOOD PRESSURE: 113 MMHG

## 2019-08-01 DIAGNOSIS — F29 ATYPICAL PSYCHOSIS (HCC): Primary | ICD-10-CM

## 2019-08-01 DIAGNOSIS — F33.1 MODERATE EPISODE OF RECURRENT MAJOR DEPRESSIVE DISORDER (HCC): ICD-10-CM

## 2019-08-01 PROCEDURE — 99214 OFFICE O/P EST MOD 30 MIN: CPT | Performed by: NURSE PRACTITIONER

## 2019-08-01 RX ORDER — RISPERIDONE 1 MG/1
1 TABLET ORAL 2 TIMES DAILY
Qty: 60 TABLET | Refills: 0 | Status: SHIPPED | OUTPATIENT
Start: 2019-08-01 | End: 2019-10-07 | Stop reason: HOSPADM

## 2019-08-01 RX ORDER — ESCITALOPRAM OXALATE 20 MG/1
20 TABLET ORAL DAILY
Qty: 30 TABLET | Refills: 0 | Status: ON HOLD | OUTPATIENT
Start: 2019-08-01 | End: 2019-10-07 | Stop reason: SDUPTHER

## 2019-08-14 ENCOUNTER — TELEPHONE (OUTPATIENT)
Dept: PSYCHIATRY | Facility: CLINIC | Age: 44
End: 2019-08-14

## 2019-08-14 NOTE — TELEPHONE ENCOUNTER
Please let her know you will need to take a message as I am seeing patients. Thank you. Also, do we have a release to speak with her mother?

## 2019-09-26 NOTE — TELEPHONE ENCOUNTER
"Patient's father called stating that patient has been \"out of control\" he states that she has been walking the roads at nights, [place]. He stated that when speaking with her on the phone she was hearing voices so that is when he started to fear she could be a harm to herself, so he called the police and they took her to a facility in [place] but he is unsure of the name of this facility. He states the PCP precribed her on Suboxone, but did not know if you were aware of this. The facility called stating that they are going to release her today but he stated when he spoke with her on the phone he did not feel she was ready to come home. He stated he wants to bring her to the ER after picking her up because he fears she is only going to act ok long enough to get discharged, but is still in the same state she was in a couple of days ago. His plan is to bring her to the ER after he picks her up today, he thinks her medications may not be helping her. Please advise.   " Patient notified,appointment made

## 2019-09-30 ENCOUNTER — TELEPHONE (OUTPATIENT)
Dept: PSYCHIATRY | Facility: CLINIC | Age: 44
End: 2019-09-30

## 2019-09-30 NOTE — TELEPHONE ENCOUNTER
"Pt mother called( Viktoriya)  she would like for you to call her if you can. I let her know that you will be seeing pt's.She very concern about the pt she is not sleeping some days  or sleeping too much and she hearing voices more and more that has gotten worse and she doesn't want to bring her to the Er. But she does have appt with you in a few days.     Called the patient's mother; patient has not doing well on her current medications, bizarre and experiencing auditory hallucinations and \"she is not on drugs now, did a few months ago thinking it would help\". \"Needs to be in the hospital\".   Has appointment October 2.     Advised the mother I would talk to CLEMENTINA Garcia.Did and she will be calling the mother and will see the patient tomorrow.   "

## 2019-10-01 ENCOUNTER — HOSPITAL ENCOUNTER (INPATIENT)
Facility: HOSPITAL | Age: 44
LOS: 6 days | Discharge: HOME | End: 2019-10-07
Attending: PSYCHIATRY & NEUROLOGY | Admitting: PSYCHIATRY & NEUROLOGY

## 2019-10-01 ENCOUNTER — OFFICE VISIT (OUTPATIENT)
Dept: PSYCHIATRY | Facility: CLINIC | Age: 44
End: 2019-10-01

## 2019-10-01 VITALS
DIASTOLIC BLOOD PRESSURE: 82 MMHG | HEART RATE: 90 BPM | HEIGHT: 62 IN | WEIGHT: 175.4 LBS | BODY MASS INDEX: 32.28 KG/M2 | SYSTOLIC BLOOD PRESSURE: 129 MMHG

## 2019-10-01 DIAGNOSIS — F29 ATYPICAL PSYCHOSIS (HCC): ICD-10-CM

## 2019-10-01 DIAGNOSIS — F33.1 MODERATE EPISODE OF RECURRENT MAJOR DEPRESSIVE DISORDER (HCC): ICD-10-CM

## 2019-10-01 DIAGNOSIS — Z79.899 MEDICATION MANAGEMENT: ICD-10-CM

## 2019-10-01 DIAGNOSIS — F29 ATYPICAL PSYCHOSIS (HCC): Primary | ICD-10-CM

## 2019-10-01 PROBLEM — F23 ACUTE PSYCHOSIS (HCC): Status: ACTIVE | Noted: 2019-10-01

## 2019-10-01 LAB
6-ACETYL MORPHINE: NEGATIVE
ALBUMIN SERPL-MCNC: 4.12 G/DL (ref 3.5–5.2)
ALBUMIN/GLOB SERPL: 1.5 G/DL
ALP SERPL-CCNC: 66 U/L (ref 39–117)
ALT SERPL W P-5'-P-CCNC: 24 U/L (ref 1–33)
AMPHET+METHAMPHET UR QL: POSITIVE
ANION GAP SERPL CALCULATED.3IONS-SCNC: 13.1 MMOL/L (ref 5–15)
AST SERPL-CCNC: 24 U/L (ref 1–32)
B-HCG UR QL: NEGATIVE
BARBITURATES UR QL SCN: NEGATIVE
BASOPHILS # BLD AUTO: 0.04 10*3/MM3 (ref 0–0.2)
BASOPHILS NFR BLD AUTO: 0.6 % (ref 0–1.5)
BENZODIAZ UR QL SCN: NEGATIVE
BILIRUB SERPL-MCNC: <0.2 MG/DL (ref 0.2–1.2)
BILIRUB UR QL STRIP: NEGATIVE
BUN BLD-MCNC: 8 MG/DL (ref 6–20)
BUN/CREAT SERPL: 11 (ref 7–25)
BUPRENORPHINE SERPL-MCNC: NEGATIVE NG/ML
CALCIUM SPEC-SCNC: 9.3 MG/DL (ref 8.6–10.5)
CANNABINOIDS SERPL QL: NEGATIVE
CHLORIDE SERPL-SCNC: 104 MMOL/L (ref 98–107)
CLARITY UR: CLEAR
CO2 SERPL-SCNC: 23.9 MMOL/L (ref 22–29)
COCAINE UR QL: NEGATIVE
COLOR UR: YELLOW
CREAT BLD-MCNC: 0.73 MG/DL (ref 0.57–1)
DEPRECATED RDW RBC AUTO: 47.5 FL (ref 37–54)
EOSINOPHIL # BLD AUTO: 0 10*3/MM3 (ref 0–0.4)
EOSINOPHIL NFR BLD AUTO: 0 % (ref 0.3–6.2)
ERYTHROCYTE [DISTWIDTH] IN BLOOD BY AUTOMATED COUNT: 16.7 % (ref 12.3–15.4)
GFR SERPL CREATININE-BSD FRML MDRD: 87 ML/MIN/1.73
GLOBULIN UR ELPH-MCNC: 2.8 GM/DL
GLUCOSE BLD-MCNC: 116 MG/DL (ref 65–99)
GLUCOSE UR STRIP-MCNC: NEGATIVE MG/DL
HAV IGM SERPL QL IA: NORMAL
HBV CORE IGM SERPL QL IA: NORMAL
HBV SURFACE AG SERPL QL IA: NORMAL
HCT VFR BLD AUTO: 33.6 % (ref 34–46.6)
HCV AB SER DONR QL: NORMAL
HGB BLD-MCNC: 10.1 G/DL (ref 12–15.9)
HGB UR QL STRIP.AUTO: NEGATIVE
IMM GRANULOCYTES # BLD AUTO: 0.01 10*3/MM3 (ref 0–0.05)
IMM GRANULOCYTES NFR BLD AUTO: 0.1 % (ref 0–0.5)
KETONES UR QL STRIP: NEGATIVE
LEUKOCYTE ESTERASE UR QL STRIP.AUTO: NEGATIVE
LYMPHOCYTES # BLD AUTO: 2.48 10*3/MM3 (ref 0.7–3.1)
LYMPHOCYTES NFR BLD AUTO: 34.9 % (ref 19.6–45.3)
MCH RBC QN AUTO: 24 PG (ref 26.6–33)
MCHC RBC AUTO-ENTMCNC: 30.1 G/DL (ref 31.5–35.7)
MCV RBC AUTO: 79.8 FL (ref 79–97)
METHADONE UR QL SCN: NEGATIVE
MONOCYTES # BLD AUTO: 0.6 10*3/MM3 (ref 0.1–0.9)
MONOCYTES NFR BLD AUTO: 8.5 % (ref 5–12)
NEUTROPHILS # BLD AUTO: 3.97 10*3/MM3 (ref 1.7–7)
NEUTROPHILS NFR BLD AUTO: 55.9 % (ref 42.7–76)
NITRITE UR QL STRIP: NEGATIVE
OPIATES UR QL: NEGATIVE
OXYCODONE UR QL SCN: NEGATIVE
PCP UR QL SCN: NEGATIVE
PH UR STRIP.AUTO: 5.5 [PH] (ref 5–8)
PLATELET # BLD AUTO: 267 10*3/MM3 (ref 140–450)
PMV BLD AUTO: 10.7 FL (ref 6–12)
POTASSIUM BLD-SCNC: 4.2 MMOL/L (ref 3.5–5.2)
PROT SERPL-MCNC: 6.9 G/DL (ref 6–8.5)
PROT UR QL STRIP: NEGATIVE
RBC # BLD AUTO: 4.21 10*6/MM3 (ref 3.77–5.28)
SODIUM BLD-SCNC: 141 MMOL/L (ref 136–145)
SP GR UR STRIP: 1.01 (ref 1–1.03)
UROBILINOGEN UR QL STRIP: NORMAL
WBC NRBC COR # BLD: 7.1 10*3/MM3 (ref 3.4–10.8)

## 2019-10-01 PROCEDURE — 80307 DRUG TEST PRSMV CHEM ANLYZR: CPT | Performed by: PSYCHIATRY & NEUROLOGY

## 2019-10-01 PROCEDURE — 85025 COMPLETE CBC W/AUTO DIFF WBC: CPT | Performed by: PSYCHIATRY & NEUROLOGY

## 2019-10-01 PROCEDURE — 81003 URINALYSIS AUTO W/O SCOPE: CPT | Performed by: PSYCHIATRY & NEUROLOGY

## 2019-10-01 PROCEDURE — 80074 ACUTE HEPATITIS PANEL: CPT | Performed by: PSYCHIATRY & NEUROLOGY

## 2019-10-01 PROCEDURE — 93005 ELECTROCARDIOGRAM TRACING: CPT | Performed by: PSYCHIATRY & NEUROLOGY

## 2019-10-01 PROCEDURE — 80053 COMPREHEN METABOLIC PANEL: CPT | Performed by: PSYCHIATRY & NEUROLOGY

## 2019-10-01 PROCEDURE — 25010000002 INFLUENZA VAC SUBUNIT QUAD 0.5 ML SUSPENSION PREFILLED SYRINGE: Performed by: PSYCHIATRY & NEUROLOGY

## 2019-10-01 PROCEDURE — 90674 CCIIV4 VAC NO PRSV 0.5 ML IM: CPT | Performed by: PSYCHIATRY & NEUROLOGY

## 2019-10-01 PROCEDURE — 81025 URINE PREGNANCY TEST: CPT | Performed by: PSYCHIATRY & NEUROLOGY

## 2019-10-01 PROCEDURE — 99214 OFFICE O/P EST MOD 30 MIN: CPT | Performed by: NURSE PRACTITIONER

## 2019-10-01 PROCEDURE — G0008 ADMIN INFLUENZA VIRUS VAC: HCPCS | Performed by: PSYCHIATRY & NEUROLOGY

## 2019-10-01 RX ORDER — RISPERIDONE 0.25 MG/1
1 TABLET ORAL 2 TIMES DAILY
Status: CANCELLED | OUTPATIENT
Start: 2019-10-01

## 2019-10-01 RX ORDER — BENZONATATE 100 MG/1
100 CAPSULE ORAL 3 TIMES DAILY PRN
Status: DISCONTINUED | OUTPATIENT
Start: 2019-10-01 | End: 2019-10-07 | Stop reason: HOSPADM

## 2019-10-01 RX ORDER — ONDANSETRON 4 MG/1
4 TABLET, FILM COATED ORAL EVERY 6 HOURS PRN
Status: DISCONTINUED | OUTPATIENT
Start: 2019-10-01 | End: 2019-10-07 | Stop reason: HOSPADM

## 2019-10-01 RX ORDER — ARIPIPRAZOLE 10 MG/1
5 TABLET ORAL DAILY
Status: DISCONTINUED | OUTPATIENT
Start: 2019-10-01 | End: 2019-10-02

## 2019-10-01 RX ORDER — OLANZAPINE 10 MG/1
TABLET ORAL
Refills: 2 | COMMUNITY
Start: 2019-08-01 | End: 2019-10-01

## 2019-10-01 RX ORDER — ALUMINA, MAGNESIA, AND SIMETHICONE 2400; 2400; 240 MG/30ML; MG/30ML; MG/30ML
15 SUSPENSION ORAL EVERY 6 HOURS PRN
Status: DISCONTINUED | OUTPATIENT
Start: 2019-10-01 | End: 2019-10-07 | Stop reason: HOSPADM

## 2019-10-01 RX ORDER — LOPERAMIDE HYDROCHLORIDE 2 MG/1
2 CAPSULE ORAL
Status: DISCONTINUED | OUTPATIENT
Start: 2019-10-01 | End: 2019-10-07 | Stop reason: HOSPADM

## 2019-10-01 RX ORDER — ESCITALOPRAM OXALATE 10 MG/1
20 TABLET ORAL DAILY
Status: CANCELLED | OUTPATIENT
Start: 2019-10-01

## 2019-10-01 RX ORDER — OLANZAPINE 5 MG/1
5 TABLET, ORALLY DISINTEGRATING ORAL 3 TIMES DAILY PRN
Status: DISCONTINUED | OUTPATIENT
Start: 2019-10-01 | End: 2019-10-07 | Stop reason: HOSPADM

## 2019-10-01 RX ORDER — IBUPROFEN 400 MG/1
400 TABLET ORAL EVERY 6 HOURS PRN
Status: DISCONTINUED | OUTPATIENT
Start: 2019-10-01 | End: 2019-10-07 | Stop reason: HOSPADM

## 2019-10-01 RX ORDER — ECHINACEA PURPUREA EXTRACT 125 MG
2 TABLET ORAL AS NEEDED
Status: DISCONTINUED | OUTPATIENT
Start: 2019-10-01 | End: 2019-10-07 | Stop reason: HOSPADM

## 2019-10-01 RX ORDER — HYDROXYZINE 50 MG/1
50 TABLET, FILM COATED ORAL EVERY 6 HOURS PRN
Status: DISCONTINUED | OUTPATIENT
Start: 2019-10-01 | End: 2019-10-02

## 2019-10-01 RX ORDER — NICOTINE 21 MG/24HR
1 PATCH, TRANSDERMAL 24 HOURS TRANSDERMAL
Status: DISCONTINUED | OUTPATIENT
Start: 2019-10-01 | End: 2019-10-07 | Stop reason: HOSPADM

## 2019-10-01 RX ORDER — FAMOTIDINE 20 MG/1
20 TABLET, FILM COATED ORAL 2 TIMES DAILY PRN
Status: DISCONTINUED | OUTPATIENT
Start: 2019-10-01 | End: 2019-10-07 | Stop reason: HOSPADM

## 2019-10-01 RX ORDER — TRAZODONE HYDROCHLORIDE 50 MG/1
50 TABLET ORAL NIGHTLY PRN
Status: DISCONTINUED | OUTPATIENT
Start: 2019-10-01 | End: 2019-10-04

## 2019-10-01 RX ADMIN — INFLUENZA A VIRUS A/SINGAPORE/GP1908/2015 IVR-180 (H1N1) ANTIGEN (MDCK CELL DERIVED, PROPIOLACTONE INACTIVATED), INFLUENZA A VIRUS A/NORTH CAROLINA/04/2016 (H3N2) HEMAGGLUTININ ANTIGEN (MDCK CELL DERIVED, PROPIOLACTONE INACTIVATED), INFLUENZA B VIRUS B/IOWA/06/2017 HEMAGGLUTININ ANTIGEN (MDCK CELL DERIVED, PROPIOLACTONE INACTIVATED), INFLUENZA B VIRUS B/SINGAPORE/INFTT-16-0610/2016 HEMAGGLUTININ ANTIGEN (MDCK CELL DERIVED, PROPIOLACTONE INACTIVATED) 0.5 ML: 15; 15; 15; 15 INJECTION, SUSPENSION INTRAMUSCULAR at 17:26

## 2019-10-01 RX ADMIN — IBUPROFEN 400 MG: 400 TABLET, FILM COATED ORAL at 20:40

## 2019-10-01 RX ADMIN — TRAZODONE HYDROCHLORIDE 50 MG: 50 TABLET ORAL at 20:40

## 2019-10-01 RX ADMIN — NICOTINE 1 PATCH: 21 PATCH TRANSDERMAL at 17:36

## 2019-10-01 NOTE — PROGRESS NOTES
"Subjective   Jazzy Valle is a 44 y.o. female who is here today for medication management     Chief Complaint: f/u Atypical psychosis     HPI:  History of Present Illness   Patient presents to the clinic today accompanied by her father whom she requests to be present. Patient reports worsening of auditory hallucinations. She reports hearing voices but not sure what they are saying. She is experiencing bizarre thinking  She continues to worry over having a device implanted in her head. She is convinced the voices are coming from the implanted device. She questions if her actions and thoughts are being controlled by the implanted device. Father reports he and family feels patient needs to be admitted to get her stabilized on her medication. Father worries she is not taking it as she should and he is concerned she making be taking other substances or that someone else may be putting drugs in her drink. Patient reports she stayed up all night last night being unable to sleep. Father reports neighbors have called him concerned because they have seen patient outside walking in the field at 3 AM. Neighbors have also reported she has been up for 3-4 days straight. She reports she will go out walking because she can't sleep or slow her mind and the \"ghosts\" won't leave her alone. Her father reports he will call her during the night when she is like this and she is upset arguing with \"ghosts\" that are following her around. Father worries about her safety. Father reports Jazzy will tell him she knows they aren't real but they are \"driving me stupid crazy\".  Father reports patient has her own place but will stay with her mother at times when she is not with her boyfriend. She denies using any substances currently and states it has been a few weeks since she last used any drugs. She does reports she will use pain pills and methamphetamine when she does use drugs. She continues to report symptoms of depression and anxiety. " "    Family Psychiatric History:  Family history is unknown by patient.    Medical/Surgical History:  Past Medical History:   Diagnosis Date   • Anxiety    • Depression    • PCO (polycystic ovaries)    • Substance abuse (CMS/HCC)    • Urinary tract infection      Past Surgical History:   Procedure Laterality Date   • BACK SURGERY     • KNEE ARTHROSCOPY         No Known Allergies        Current Medications:   No current outpatient medications on file.     No current facility-administered medications for this visit.          Review of Systems   Constitutional: Negative.    HENT: Negative.    Eyes: Negative.    Respiratory: Negative.    Cardiovascular: Negative.    Gastrointestinal: Negative.    Endocrine: Negative.    Genitourinary: Negative.    Musculoskeletal: Positive for arthralgias and myalgias.   Skin: Negative.    Allergic/Immunologic: Negative.    Neurological: Negative.    Hematological: Negative.    Psychiatric/Behavioral: Positive for decreased concentration and hallucinations.    denies HEENT, cardiovascular, respiratory, liver, renal, GI/, endocrine, neuro, DERM, hematology, immunology, musculoskeletal disorders.    Objective   Physical Exam   Constitutional: She appears well-developed and well-nourished. No distress.   Vitals reviewed.    Blood pressure 129/82, pulse 90, height 157 cm (61.81\"), weight 79.6 kg (175 lb 6.4 oz), not currently breastfeeding.    Mental Status Exam:   Hygiene:   poor  Cooperation:  Cooperative  Eye Contact:  Good  Psychomotor Behavior:  Appropriate. No abnormal movements, EPS or TD noted.  Affect:  Appropriate  Hopelessness: Denies  Speech:Normal  Thought Process:  Goal directed and Linear  Thought Content:  Bizarre   Suicidal:  None  Homicidal:  None  Hallucinations:  Not demonstrated today   Delusion:  Paranoid   Memory:  Intact  Orientation:  Person, Place, Time and Situation  Reliability:  fair  Insight: Poor  Judgement:  Poor  Impulse Control:  Poor  Physical/Medical " Issues:  Yes Chronic pain         Assessment/Plan   Diagnoses and all orders for this visit:    Atypical psychosis (CMS/HCC)    Medication management  -     [facility] Drug Screen        Body mass index is 32.28 kg/m².  Patient was educated on healthier and more balanced diet choices and encouraged exercise within physical limitations.  Functionality: pt having significant impairment in important areas of daily functioning.  Prognosis: Guarded dependent on medication/follow up and treatment plan compliance.    Impression: Patient experiencing worsening auditory hallucinations, paranoia and bizarre thinking.     Plan:  1.Discussed patient with Dr. Barone who came in during visit to talk with patient and father. Patient to be admitted for safety and stabilization.  2. Report called to nurse. Patient to be admitted to A.     Discussed medication options. Discussed the risks, benefits, and side effects of the medication; client acknowledged and verbally consented.  Patient is aware to contact the [facility] with any worsening of symptom.  Patient is agreeable to go to the ER or call 911 should they begin SI/HI.

## 2019-10-01 NOTE — NURSING NOTE
"Presents as a direct admission from the [facility]. Per RICARDO note:    She reports hearing voices but not sure what they are saying. She is experiencing bizarre thinking  She continues to worry over having a device implanted in her head. She is convinced the voices are coming from the implanted device. She questions if her actions and thoughts are being controlled by the implanted device. Father reports he and family feels patient needs to be admitted to get her stabilized on her medication. Father worries she is not taking it as she should and he is concerned she making be taking other substances or that someone else may be putting drugs in her drink. Patient reports she stayed up all night last night being unable to sleep. Father reports neighbors have called him concerned because they have seen patient outside walking in the field at 3 AM. Neighbors have also reported she has been up for 3-4 days straight. She reports she will go out walking because she can't sleep or slow her mind and the \"ghosts\" won't leave her alone. Her father reports he will call her during the night when she is like this and she is upset arguing with \"ghosts\" that are following her around. Father worries about her safety. Father reports Jazzy will tell him she knows they aren't real but they are \"driving me stupid crazy\".  Father reports patient has her own place but will stay with her mother at times when she is not with her boyfriend. She denies using any substances currently and states it has been a few weeks since she last used any drugs. She does reports she will use pain pills and methamphetamine when she does use drugs.     Upon admission, pt is vague, but cooperative with assessment.  She is responding to internal stimuli.  She often whispers, pauses, and then laughs while looking around the room.  Reports hx of admission to [facility], but states it was \"a long time ago.\"  "

## 2019-10-02 PROBLEM — F20.5: Status: ACTIVE | Noted: 2019-10-02

## 2019-10-02 PROBLEM — D50.9 ANEMIA, IRON DEFICIENCY: Status: ACTIVE | Noted: 2019-10-02

## 2019-10-02 PROBLEM — F15.10 METHAMPHETAMINE ABUSE: Status: ACTIVE | Noted: 2019-10-02

## 2019-10-02 LAB
T4 FREE SERPL-MCNC: 1 NG/DL (ref 0.93–1.7)
TSH SERPL DL<=0.05 MIU/L-ACNC: 1.93 UIU/ML (ref 0.27–4.2)

## 2019-10-02 PROCEDURE — 99223 1ST HOSP IP/OBS HIGH 75: CPT | Performed by: PSYCHIATRY & NEUROLOGY

## 2019-10-02 PROCEDURE — 84439 ASSAY OF FREE THYROXINE: CPT | Performed by: PSYCHIATRY & NEUROLOGY

## 2019-10-02 PROCEDURE — 84443 ASSAY THYROID STIM HORMONE: CPT | Performed by: PSYCHIATRY & NEUROLOGY

## 2019-10-02 RX ORDER — HYDROXYZINE HYDROCHLORIDE 25 MG/1
25 TABLET, FILM COATED ORAL EVERY 6 HOURS PRN
Status: DISCONTINUED | OUTPATIENT
Start: 2019-10-02 | End: 2019-10-07 | Stop reason: HOSPADM

## 2019-10-02 RX ORDER — RISPERIDONE 2 MG/1
2 TABLET ORAL EVERY 12 HOURS SCHEDULED
Status: DISCONTINUED | OUTPATIENT
Start: 2019-10-02 | End: 2019-10-07 | Stop reason: HOSPADM

## 2019-10-02 RX ADMIN — RISPERIDONE 2 MG: 2 TABLET, FILM COATED ORAL at 21:08

## 2019-10-02 RX ADMIN — IBUPROFEN 400 MG: 400 TABLET, FILM COATED ORAL at 16:25

## 2019-10-02 RX ADMIN — RISPERIDONE 2 MG: 2 TABLET, FILM COATED ORAL at 13:00

## 2019-10-02 RX ADMIN — NICOTINE 1 PATCH: 21 PATCH TRANSDERMAL at 08:46

## 2019-10-02 RX ADMIN — IBUPROFEN 400 MG: 400 TABLET, FILM COATED ORAL at 02:15

## 2019-10-02 NOTE — PROGRESS NOTES
"7810:     DATA:         Therapist met individually with patient this date to introduce role and to discuss hospitalization expectations. Patient agreeable.     Therapist gained consent and contacted patient's father Erwin at [phone]; He reports that he is very supportive of the patient.  He reports that the patient resides with her mother and that the family has been concerned with the patient sitting around talking to herself.  He reports that neighbors have noticed the patient walking around outside at 3 a.m. He reports concerns that the patient may be doing drugs with her boyfriend. He states that the boyfriend is currently in senior care due to being busted with Methamphetamine.  He reports being concerned that boyfriend may have been slipping her drugs in an attempt to hang on to her.  He reports that the patient \"has not be right for 2 years.\"  He reports that the patient had taken in a friend's baby to raise and that the child is now 7. He reports that the school apparently called CPS due to concerns about the patient being intoxicated. CPS investigated and deemed that the patient would have to stay with the patient's mother while she cared for the child.  We discussed safety planning and the plan will be for patient to return to her mother's home at discharge.  He reports that the therapist may call her if the patient will consent at [phone].      Clinical Maneuvering/Intervention:     Therapist assisted patient in processing above session content; acknowledged and normalized patient’s thoughts, feelings, and concerns.  Discussed the therapist/patient relationship and explain the parameters and limitations of relative confidentiality.  Also discussed the importance active participation, and honesty to the treatment process.  Encouraged the patient to discuss/vent her feelings, frustrations, and fears concerning her ongoing medical issues and validated her feelings.     Discussed the importance of finding " enjoyable activities and coping skills that the patient can engage in a regular basis. Discussed healthy coping skills such as distraction, self love, grounding, thought challenges/reframing, etc.  Provided patient with list of healthy coping skills this date. Discussed the importance of medication compliance.  Praised the patient for seeking help and spent the majority of the session building rapport.       Allowed patient to freely discuss issues without interruption or judgment. Provided safe, confidential environment to facilitate the development of positive therapeutic relationship and encourage open, honest communication.      Therapist addressed discharge safety planning this date. Assisted patient in identifying risk factors which would indicate the need for higher level of care after discharge;  including thoughts to harm self or others and/or self-harming behavior. Encouraged patient to call 911, or present to the nearest emergency room should any of these events occur. Discussed crisis intervention services and means to access.  Encouraged securing any objects of harm.       Therapist completed integrated summary, treatment plan, and initiated social history this date.  Therapist is strongly encouraging family involvement in treatment.       ASSESSMENT:      The patient is a 44 y.o. , some college education, unemployed, occasional [employer] attending [employer], SSD recipient female who was admitted on 10/1/2019 with complaints of hearing voices and thinking that something has been implanted in her head to cause those voices. Patient required direct admission from the [facility] yesterday due to complaints of this device controlling her thoughts and actions.  Patient states the voices have been worse for the past 6 months.  States her sleep has been poor and she feels that things are out of control.  Denies suicidal ideation at this time, but states she feels discouraged.  Patient reports sporadic  "drug use. She admits to using Methamphetamine occasionally; reports last use to be last week.  There has been apparent concerns about patient possibly having contact with drugs when she is at her boyfriend's house.  Patient's father ([phone])  has apparently voiced concerns about her safety.  He states that neighbors have called him concerned because the patient has been outside walking in a field at 3 AM.  Some neighbors also said the patient has been up for 3-4 days straight with no sleep.  Patient reports that she goes out walking to slow her mind down and so the \"ghost\" will leave her alone.  Patient's father reports that patient has called him in the middle of the night upset saying the \"ghost\" are following her around. Today, is calm and cooperative. She requests to sleep.  She requests [facility] referral.       PLAN:       Patient to remain hospitalized this date.     Treatment team will focus efforts on stabilizing patient's acute symptoms while providing education on healthy coping and crisis management to reduce hospitalizations.   Patient requires daily psychiatrist evaluation and 24/7 nursing supervision to promote patient  safety.     Therapist will offer 1-4 individual sessions (20-30 minutes each), 1 therapy group daily, family education, and appropriate referral.    Therapist recommends residential referral. Patient refuses and requests [facility] referral.   "

## 2019-10-02 NOTE — PLAN OF CARE
Problem: Patient Care Overview  Goal: Plan of Care Review  Outcome: Ongoing (interventions implemented as appropriate)  PATIENT DENIES ANY PROBLEMS THIS SHIFT, OTHER THAN AUDITORY HALLUCINATIONS. . PATIENT APPEARS TO BE THOUGHT BLOCKING. PATIENTS CONDITION APPEARS UNCHANGED. PATIENT REMAINS, QUIETLY, IN HER ROOM MOST OF THIS SHIFT. PATIENT IS AOX3. NEW ORDERS: RISPERDAL 2MG,Q 12 HOURS, VISTARIL 25 MG Q 6 HOURS PRN, D/C ABILIFY. T4 TSH, IRON PROFILE, OCCULT BLOOD X 1 STOOL, FLP.   10/02/19 1414   Coping/Psychosocial   Plan of Care Reviewed With patient   Coping/Psychosocial   Patient Agreement with Plan of Care agrees   Plan of Care Review   Progress no change       Problem: Overarching Goals (Adult)  Goal: Adheres to Safety Considerations for Self and Others  Outcome: Ongoing (interventions implemented as appropriate)    Goal: Optimized Coping Skills in Response to Life Stressors  Outcome: Ongoing (interventions implemented as appropriate)    Goal: Develops/Participates in Therapeutic [place] to Support Successful Transition  Outcome: Ongoing (interventions implemented as appropriate)

## 2019-10-02 NOTE — PLAN OF CARE
Problem: Patient Care Overview  Goal: Individualization and Mutuality  Outcome: Ongoing (interventions implemented as appropriate)   10/02/19 1419 10/02/19 1435   Individualization   Patient Specific Preferences --  None specified    Patient Specific Goals (Include Timeframe) --  Patient will deny SI/HI prior to discharge. Patient will consent to appropriate aftercare plan prior to discharge. Patient will identify 1-2 healthy coping skills for psychosis prior to discharge.    Patient Specific Interventions --  Therapist will offer 1-4 individual sessions, family education, aftercare referral.    Mutuality/Individual Preferences   What Anxieties, Fears, Concerns, or Questions Do You Have About Your Care? --  None identified    What Information Would Help Us Give You More Personalized Care? --  None identified    How Would You and/or Your Support Person Like to Participate in Your Care? --  Patient signed consent for her father Erwin.    Mutuality/Individual Preferences   How to Address Anxieties/Fears --  NA; agreeable to talk to staff    Personal Strengths/Vulnerabilities   Patient Personal Strengths family/social support;community support;resourceful;spiritual/Islam support;stable living environment;realistic evaluation of current/future capabilities;positive educational history --    Patient Vulnerabilities Substance abuse issues, psychosis  --          Goal: Discharge Needs Assessment  Outcome: Ongoing (interventions implemented as appropriate)   10/02/19 1435   Discharge Needs Assessment   Readmission Within the Last 30 Days no previous admission in last 30 days   Concerns to be Addressed mental health;substance/tobacco abuse/use;compliance issue   Concerns Comments Patient will deny SI/HI prior to discharge.    Patient/Family Anticipates Transition to home with family   Patient/Family Anticipated Services at Transition mental health services;outpatient care   Transportation Concerns car, none    Transportation Anticipated family or friend will provide   Offered/Gave Vendor List no   Patient's Choice of Community Agency(s) Patient agreeable to [facility] referral.    Current Discharge Risk substance use/abuse;psychiatric illness   Discharge Coordination/Progress Patient has insurance for discharge planning. Patient agreeable to [facility] referral. Patient to return home with her mother.    Discharge Needs Assessment,    Outpatient/Agency/Support Group Needs outpatient counseling;outpatient psychiatric care (specify);outpatient substance abuse treatment (specify);outpatient medication management   Anticipated Discharge Disposition home or self-care         Goal: Interprofessional Rounds/Family Conf  Outcome: Outcome(s) achieved Date Met: 10/02/19   10/02/19 3603   Interdisciplinary Rounds/Family Conf   Summary Treatment team staffing    Interdisciplinary Rounds/Family Conf   Participants social work;psychiatrist;patient;milieu/psych techs;nursing;family

## 2019-10-02 NOTE — PLAN OF CARE
Problem: Patient Care Overview  Goal: Plan of Care Review  Outcome: Ongoing (interventions implemented as appropriate)   10/01/19 2131   Coping/Psychosocial   Plan of Care Reviewed With patient   Coping/Psychosocial   Patient Agreement with Plan of Care agrees   Plan of Care Review   Progress no change   OTHER   Outcome Summary Pt calm and cooperative. Pt denies anxiety, depression, SI, HI or AVH. Pt reports appetite as good and sleep as fair. 10/1/19 2133       Problem: Overarching Goals (Adult)  Goal: Adheres to Safety Considerations for Self and Others  Outcome: Ongoing (interventions implemented as appropriate)    Goal: Optimized Coping Skills in Response to Life Stressors  Outcome: Ongoing (interventions implemented as appropriate)    Goal: Develops/Participates in Therapeutic [place] to Support Successful Transition  Outcome: Ongoing (interventions implemented as appropriate)

## 2019-10-02 NOTE — H&P
"INITIAL PSYCHIATRIC HISTORY & PHYSICAL    Patient Identification:  Name:   Jazzy Valle  Age:   44 y.o.  Sex:   female  :   1975  MRN:   [id number]  Visit Number:   [id number]  Primary Care Physician:   Mariel Kingston APRN    SUBJECTIVE    CC/Focus of Exam: Psychosis    Patient initial statement: \"I have been hearing voices.\"    HPI: Jazzy Valle is a 44 y.o. , some college education, unemployed, occasional Religious attending [employer], SSD recipient female who was admitted on 10/1/2019 with complaints of hearing voices and thinking that something has been implanted in her head to cause those voices.  Patient asking for scan of her head to check for this implanted device.  Patient presented to outpatient RICARDO's office yesterday with complaints of this device controlling her thoughts and actions.  Patient states the voices have been worse for the past 6 months.  States her sleep has been poor and she feels that things are out of control.  Denies suicidal ideation at this time, but states she feels discouraged.    Patient's father ([phone])  has concerns about her safety.  He states that neighbors have called him concerned because the patient has been outside walking in a field at 3 AM.  Some neighbors also said the patient has been up for 3-4 days straight with no sleep.  Patient reports that she goes out walking to slow her mind down and so the \"ghost\" will leave her alone.  Patient's father reports that patient has called him in the middle of the night upset saying the \"ghost\" are following her around.    Patient states she does not like Abilify because \"it makes her feel like she is moving when she is not\".      Patient also being very vague about her symptoms.    Attempted to call father, but he was unavailable to speak.  Will attempt again tomorrow.    Medical: Patient had disc surgery on her neck in , a history of tailbone surgery, and a history of a bursa sac surgery on her " "knee.  NKA.  LMP 3 days ago.    Available medical/psychiatric records reviewed and incorporated into the current document.     PAST PSYCHIATRIC HX: Patient reports she has had 10+ admissions.  Her last admission was 4-5 months ago at [facility] on an involuntary basis.  That admission last 3 days.  First inpatient psychiatric hospitalization was at age 16.    SUBSTANCE USE HX: Patient states she has been using methamphetamine at times, \"but not all the time\".  States she takes it orally.  Her last use is reported as last week.  Denies any opiate or benzo use recently.  UDS is positive for methamphetamine.  Has been in a Suboxone clinic in the past.   See smokes 1 ppd.        FAMILY HX: Denies any family history of psychiatric or substance abuse issues.  No suicides among first-degree relatives.    SOCIAL HX: Patient was born and raised in [place], [place].  She was raised by her parents, and her parents  at 11 years old.  She denies any history of abuse.  Denies any legal issues.  Patient lives with her mother but is back and forth to a 's household. Patient's father is concerns about the enviroment at the [facility], apparent regarding drugs. Patient has some college education (2 years), denies student debt.  Denies any legal issues.  She has been  for over 20 years after being  for 3 years.  She has no children, but is raising a 7-year-old female cousin.    Past Medical History:   Diagnosis Date   • Anxiety    • Depression    • Hypertension    • PCO (polycystic ovaries)    • Psychosis (CMS/HCC)    • Substance abuse (CMS/[facility])        Past Surgical History:   Procedure Laterality Date   • BACK SURGERY  2008   • CERVICAL SPINE SURGERY  2008   • KNEE SURGERY Right 2014       Family History   Family history unknown: Yes         Medications Prior to Admission   Medication Sig Dispense Refill Last Dose   • escitalopram (LEXAPRO) 20 MG tablet Take 1 tablet by mouth Daily. 30 tablet 0 Past Week at " Unknown time   • risperiDONE (risperDAL) 1 MG tablet Take 1 tablet by mouth 2 (Two) Times a Day. 60 tablet 0 Past Week at Unknown time           ALLERGIES:  Patient has no known allergies.    Temp:  [97.6 °F (36.4 °C)-98.8 °F (37.1 °C)] 98.2 °F (36.8 °C)  Heart Rate:  [81-98] 98  Resp:  [18] 18  BP: (119-132)/(80-94) 119/94    REVIEW OF SYSTEMS:  Review of Systems   Constitutional: Negative.    HENT: Negative.    Eyes: Negative.    Respiratory: Negative.    Cardiovascular: Negative.    Gastrointestinal: Negative.    Endocrine: Negative.    Genitourinary: Negative.    Musculoskeletal: Negative.    Skin: Negative.    Allergic/Immunologic: Negative.    Neurological: Negative.    Hematological: Negative.    Psychiatric/Behavioral: Positive for hallucinations and sleep disturbance. The patient is nervous/anxious.       See HPI for psychiatric ROS  OBJECTIVE    PHYSICAL EXAM:  Physical Exam   Constitutional: She appears well-developed and well-nourished.   HENT:   Head: Normocephalic and atraumatic.   Right Ear: External ear normal.   Left Ear: External ear normal.   Nose: Nose normal.   Mouth/Throat: Oropharynx is clear and moist.   Eyes: Conjunctivae and EOM are normal. Pupils are equal, round, and reactive to light.   Neck: Normal range of motion. Neck supple.   Cardiovascular: Normal rate, regular rhythm, normal heart sounds and intact distal pulses.   Pulmonary/Chest: Effort normal and breath sounds normal.   Abdominal: Soft.   Genitourinary:   Genitourinary Comments: Breast and GYN exam deferred.    Nursing note and vitals reviewed.      MENTAL STATUS EXAM:               Hygiene:   fair  Cooperation:  Evasive  Eye Contact:  Fair  Psychomotor Behavior:  Restless  Affect:  Restricted  Hopelessness: Denies  Speech:  Normal  Thought Progress:  Linear  Thought Content:  Bizarre, delusional See HPI.   Suicidal:  None  Homicidal:  None  Hallucinations:  Auditory  Delusion:  None  Memory:  Intact  Orientation:  Person,  Place, Time and Situation  Reliability:  fair  Insight:  Poor  Judgement:  Poor  Impulse Control:  Poor      Imaging Results (last 24 hours)     ** No results found for the last 24 hours. **           ECG/EMG Results (most recent)     Procedure Component Value Units Date/Time    ECG 12 Lead [[id number]] Collected:  10/01/19 1634     Updated:  10/02/19 0912    Narrative:       Test Reason : Baseline Cardiac Status  Blood Pressure : **/** mmHG  Vent. Rate : 068 BPM     Atrial Rate : 068 BPM     P-R Int : 136 ms          QRS Dur : 076 ms      QT Int : 388 ms       P-R-T Axes : 071 075 067 degrees     QTc Int : 412 ms    Normal sinus rhythm  Normal ECG  No previous ECGs available  Confirmed by Michael Major ([id number]) on 10/2/2019 9:12:22 AM    Referred By:  DEMI           Confirmed By:Michael Major           Lab Results   Component Value Date    GLUCOSE 116 (H) 10/01/2019    BUN 8 10/01/2019    CREATININE 0.73 10/01/2019    EGFRIFNONA 87 10/01/2019    BCR 11.0 10/01/2019    CO2 23.9 10/01/2019    CALCIUM 9.3 10/01/2019    ALBUMIN 4.12 10/01/2019    LABIL2 1.5 03/12/2016    AST 24 10/01/2019    ALT 24 10/01/2019       Lab Results   Component Value Date    WBC 7.10 10/01/2019    HGB 10.1 (L) 10/01/2019    HCT 33.6 (L) 10/01/2019    MCV 79.8 10/01/2019     10/01/2019       Pain Management Panel     Pain Management Panel Latest Ref Rng & Units 10/1/2019 3/13/2016    AMPHETAMINES SCREEN, URINE Negative Positive(A) Negative    BARBITURATES SCREEN Negative Negative Negative    BENZODIAZEPINE SCREEN, URINE Negative Negative Negative    BUPRENORPHINEUR Negative Negative -    COCAINE SCREEN, URINE Negative Negative Negative    METHADONE SCREEN, URINE Negative Negative Negative          Brief Urine Lab Results  (Last result in the past 365 days)      Color   Clarity   Blood   Leuk Est   Nitrite   Protein   CREAT   Urine HCG        10/01/19 1554               Negative     10/01/19 1554 Yellow Clear Negative Negative  Negative Negative               Reviewed labs and studies done with this admission.       ASSESSMENT & PLAN:        Chronic residual schizophrenia with acute exacerbations (CMS/HCC)  -We will continue with the Risperdol in a more therapeutic dosage aiming towards Depo format.  Individual and group psychotherapy    Essential hypertension  -Normotensive at this time, will monitor.    Tobacco abuse  - NRT plus encouraging her to stop smoking.     Methamphetamine abuse (CMS/HCC)  - will incorporate Recovery principles in the psychotherapeutic effort.     Anemia, iron deficiency  -Will evaluate further.        The patient has been admitted for safety and stabilization.  Patient will be monitored for suicidality daily and maintained on Sucide precaution Level 3 (q15 min checks) . The patient will have individual and group therapy with a master's level therapist. A master treatment plan will be developed and agreed upon by the patient and his/her treatment team.  The patient's estimated length of stay in the hospital is 5-7 days.       Written by Jamie Vazquez RN, acting as scribe for Dr. Barone. Dr. Barone's signature on this note affirms that the note adequately documents the care provided.     Elsy Vazquez RN  10/02/19  10:35 AM    I personally performed the services described in this note as scribed in my presence, and the note is both complete and accurate. I spent a total of 70 minutes in direct patient care including face to face examination.  43 minutes were spent in coordination of care, and counseling the patient on the current and follow-up treatment plans regarding the patient's psychotic symptomatology and substance abuse. Answered patient questions regarding medications..    DIOGO Barone MD

## 2019-10-03 LAB
CHOLEST SERPL-MCNC: 107 MG/DL (ref 0–200)
HDLC SERPL-MCNC: 60 MG/DL (ref 40–60)
IRON 24H UR-MRATE: 18 MCG/DL (ref 37–145)
IRON SATN MFR SERPL: 4 % (ref 20–50)
LDLC SERPL CALC-MCNC: 27 MG/DL (ref 0–100)
LDLC/HDLC SERPL: 0.44 {RATIO}
TIBC SERPL-MCNC: 457 MCG/DL (ref 298–536)
TRANSFERRIN SERPL-MCNC: 307 MG/DL (ref 200–360)
TRIGL SERPL-MCNC: 102 MG/DL (ref 0–150)
VLDLC SERPL-MCNC: 20.4 MG/DL

## 2019-10-03 PROCEDURE — 84466 ASSAY OF TRANSFERRIN: CPT | Performed by: PSYCHIATRY & NEUROLOGY

## 2019-10-03 PROCEDURE — 99232 SBSQ HOSP IP/OBS MODERATE 35: CPT | Performed by: PSYCHIATRY & NEUROLOGY

## 2019-10-03 PROCEDURE — 80061 LIPID PANEL: CPT | Performed by: PSYCHIATRY & NEUROLOGY

## 2019-10-03 PROCEDURE — 83540 ASSAY OF IRON: CPT | Performed by: PSYCHIATRY & NEUROLOGY

## 2019-10-03 RX ORDER — FERROUS SULFATE 325(65) MG
325 TABLET ORAL
Status: DISCONTINUED | OUTPATIENT
Start: 2019-10-03 | End: 2019-10-07 | Stop reason: HOSPADM

## 2019-10-03 RX ORDER — ESCITALOPRAM OXALATE 10 MG/1
10 TABLET ORAL DAILY
Status: DISCONTINUED | OUTPATIENT
Start: 2019-10-03 | End: 2019-10-07 | Stop reason: HOSPADM

## 2019-10-03 RX ADMIN — IBUPROFEN 400 MG: 400 TABLET, FILM COATED ORAL at 03:20

## 2019-10-03 RX ADMIN — RISPERIDONE 2 MG: 2 TABLET, FILM COATED ORAL at 08:15

## 2019-10-03 RX ADMIN — RISPERIDONE 2 MG: 2 TABLET, FILM COATED ORAL at 21:15

## 2019-10-03 RX ADMIN — IBUPROFEN 400 MG: 400 TABLET, FILM COATED ORAL at 16:30

## 2019-10-03 RX ADMIN — ESCITALOPRAM OXALATE 10 MG: 10 TABLET ORAL at 10:21

## 2019-10-03 RX ADMIN — NICOTINE 1 PATCH: 21 PATCH TRANSDERMAL at 08:15

## 2019-10-03 RX ADMIN — IBUPROFEN 400 MG: 400 TABLET, FILM COATED ORAL at 10:22

## 2019-10-03 RX ADMIN — FERROUS SULFATE TAB 325 MG (65 MG ELEMENTAL FE) 325 MG: 325 (65 FE) TAB at 10:21

## 2019-10-03 NOTE — PLAN OF CARE
Problem: Patient Care Overview  Goal: Plan of Care Review  Outcome: Ongoing (interventions implemented as appropriate)    Goal: Individualization and Mutuality  Outcome: Ongoing (interventions implemented as appropriate)    Goal: Discharge Needs Assessment  Outcome: Ongoing (interventions implemented as appropriate)    Goal: Interprofessional Rounds/Family Conf  Outcome: Ongoing (interventions implemented as appropriate)      Problem: Overarching Goals (Adult)  Goal: Adheres to Safety Considerations for Self and Others  Outcome: Ongoing (interventions implemented as appropriate)    Goal: Optimized Coping Skills in Response to Life Stressors  Outcome: Ongoing (interventions implemented as appropriate)    Goal: Develops/Participates in Therapeutic [place] to Support Successful Transition  Outcome: Ongoing (interventions implemented as appropriate)

## 2019-10-03 NOTE — PLAN OF CARE
Problem: Patient Care Overview  Goal: Interprofessional Rounds/Family Conf  Outcome: Ongoing (interventions implemented as appropriate)   10/03/19 0936   Interdisciplinary Rounds/Family Conf   Summary Treatment team staffing    Interdisciplinary Rounds/Family Conf   Participants social work;patient;nursing;family;milieu/psych techs     0920:     DATA:         Therapist met individually this date and staffed case with Dr. Barone.  Therapist recommended involving patient's mother Viktoriya in treatment for safety planning. Patient signed consent for Viktoriya at [phone]; Viktoriya was contacted this date. She reports that patient lives with her and can return at discharge. She confirms that she cares and monitors the minor child in the home. She identifies that she is agreeable to patient having case management services and depo medication. She reports that patient is often non-compliant and that this will likely help her.   She confirms that her home is safe guarded and reports all weapons are locked up. She denies safety concerns with patient returning home.     Clinical Maneuvering/Intervention:     Therapist assisted patient in processing above session content; acknowledged and normalized patient’s thoughts, feelings, and concerns.  Discussed the therapist/patient relationship and explain the parameters and limitations of relative confidentiality.  Also discussed the importance active participation, and honesty to the treatment process.  Encouraged the patient to discuss/vent her feelings, frustrations, and fears concerning her ongoing medical issues and validated her feelings.     Discussed the importance of finding enjoyable activities and coping skills that the patient can engage in a regular basis. Discussed healthy coping skills such as distraction, self love, grounding, thought challenges/reframing, etc.  Provided patient with list of healthy coping skills this date. Discussed the importance of medication compliance.   Praised the patient for seeking help and spent the majority of the session building rapport.       Allowed patient to freely discuss issues without interruption or judgment. Provided safe, confidential environment to facilitate the development of positive therapeutic relationship and encourage open, honest communication.      Therapist addressed discharge safety planning this date. Assisted patient in identifying risk factors which would indicate the need for higher level of care after discharge;  including thoughts to harm self or others and/or self-harming behavior. Encouraged patient to call 911, or present to the nearest emergency room should any of these events occur. Discussed crisis intervention services and means to access.  Encouraged securing any objects of harm.          ASSESSMENT:      The patient was seen for follow up of Schizophrenia and Methamphetamine abuse. Today, patient was seen with Dr. Barone.  Patient calm and cooperative, but remains delusional.  Patient asking for CT scan due to believing she has implant in her brain.  She is receptive to emotional support and education regarding Depo injection and services like [facility] case management. Patient somewhat guarded with therapist. She denies SI/HI.  She is noted to have appropriate affect.  She appears disheveled with poor grooming and has been isolative to bed.      PLAN:       Patient to remain hospitalized this date.      Treatment team will focus efforts on stabilizing patient's acute symptoms while providing education on healthy coping and crisis management to reduce hospitalizations.   Patient requires daily psychiatrist evaluation and 24/7 nursing supervision to promote patient  safety.     Therapist will offer 1-4 individual sessions (20-30 minutes each), 1 therapy group daily, family education, and appropriate referral.     Therapist recommends residential referral. Patient refuses and requests [facility] referral. She is also  agreeable to [facility] and case management.

## 2019-10-03 NOTE — PLAN OF CARE
Problem: Patient Care Overview  Goal: Plan of Care Review  Outcome: Ongoing (interventions implemented as appropriate)   10/02/19 2019   Coping/Psychosocial   Plan of Care Reviewed With patient   Coping/Psychosocial   Patient Agreement with Plan of Care agrees   Plan of Care Review   Progress improving   OTHER   Outcome Summary Pt cooperative. Pt denies anxiety, depression, SI, HI or AVH. Pt reports sleep and appetite as good. 10/2/19 2024       Problem: Overarching Goals (Adult)  Goal: Adheres to Safety Considerations for Self and Others  Outcome: Ongoing (interventions implemented as appropriate)    Goal: Optimized Coping Skills in Response to Life Stressors  Outcome: Ongoing (interventions implemented as appropriate)    Goal: Develops/Participates in Therapeutic [place] to Support Successful Transition  Outcome: Ongoing (interventions implemented as appropriate)

## 2019-10-03 NOTE — PROGRESS NOTES
"INPATIENT PSYCHIATRIC PROGRESS NOTE    Name:  Jazzy Valle  :  1975  MRN:  [id number]  Visit Number:  [id number]  Length of stay:  2    Behavioral Health Treatment Plan and Problem List: I have reviewed and approved the Behavioral Health Treatment Plan and Problem list.    SUBJECTIVE    CC/ Focus of exam: psychosis/ depression     Patient's subjective status: \"OK\"     INTERVAL HISTORY: request CT scan for the implant (responsible for \"voices\"). Remains delusional. Mildly depressed. Not active paranoia (history of), requesting resumption of an antidepressant (Lexapro).   Will give the po Risperdal another day, go to the IM depo tomorrow if no adverse side effects to the po   Discussed patient at staffing. Aiming for case management via [facility] and follow up at the [facility].  CPS already involved requiring patient's mother presence with the patient and the 8 y/o niece Marissa. To do a safety plan with the patient's mother.         Reports no Methamphetamine for perhaps 1 week PTA, but took Adipex and is suppose to be taking Metformin of POD.     Depression rating 4/10  Anxiety rating 0/10  Sleep: good  Withdrawal sx: none  Craving: denies     Review of Systems   HENT:        Report recent prior probable with painful swallowing. Not now.    Respiratory: Negative.    Cardiovascular: Negative.    Gastrointestinal: Negative.    Musculoskeletal:        Report recent left hip pain that radiates down the lateral aspect of her LE. \"But not bad now\". Asking for Gabapentin Rx.            OBJECTIVE    Temp:  [97.8 °F (36.6 °C)-98.1 °F (36.7 °C)] 97.8 °F (36.6 °C)  Heart Rate:  [] 80  Resp:  [18] 18  BP: ()/(59-83) 98/59    MENTAL STATUS EXAM:      Appearance:Casually dressed, good hygeine.   Cooperation:Cooperative  Psychomotor: No psychomotor agitation/retardation, No EPS, No motor tics  Speech-normal rate, amount.   Mood/Affect:  Appropriate  Thought Processes:  coherent  Thought Content: "  dilussional  Hallucination(s): auditory  Hopelessness: No  Optimistic:minimally  Suicidal Thoughts:   No  Suicidal Plan/Intent:  No  Homicidal Thoughts:  absent  Orientation: oriented x 3  Memory: recent intact    Lab Results (last 24 hours)     Procedure Component Value Units Date/Time    Iron Profile [[id number]]  (Abnormal) Collected:  10/03/19 0306    Specimen:  Blood Updated:  10/03/19 0406     Iron 18 mcg/dL      Iron Saturation 4 %      Transferrin 307 mg/dL      TIBC 457 mcg/dL     Lipid Panel [[id number]] Collected:  10/03/19 0306    Specimen:  Blood Updated:  10/03/19 0406     Total Cholesterol 107 mg/dL      Triglycerides 102 mg/dL      HDL Cholesterol 60 mg/dL      LDL Cholesterol  27 mg/dL      VLDL Cholesterol 20.4 mg/dL      LDL/HDL Ratio 0.44    Narrative:       Cholesterol Reference Ranges  (U.S. Department of Health and Human Services ATP III Classifications)    Desirable          <200 mg/dL  Borderline High    200-239 mg/dL  High Risk          >240 mg/dL      Triglyceride Reference Ranges  (U.S. Department of Health and Human Services ATP III Classifications)    Normal           <150 mg/dL  Borderline High  150-199 mg/dL  High             200-499 mg/dL  Very High        >500 mg/dL    HDL Reference Ranges  (U.S. Department of Health and Human Services ATP III Classifcations)    Low     <40 mg/dl (major risk factor for CHD)  High    >60 mg/dl ('negative' risk factor for CHD)        LDL Reference Ranges  (U.S. Department of Health and Human Services ATP III Classifcations)    Optimal          <100 mg/dL  Near Optimal     100-129 mg/dL  Borderline High  130-159 mg/dL  High             160-189 mg/dL  Very High        >189 mg/dL    T4, Free [[id number]]  (Normal) Collected:  10/02/19 1533    Specimen:  Blood Updated:  10/02/19 1642     Free T4 1.00 ng/dL     TSH [[id number]]  (Normal) Collected:  10/02/19 1533    Specimen:  Blood Updated:  10/02/19 1640     TSH 1.930 uIU/mL            Imaging Results (last  24 hours)     ** No results found for the last 24 hours. **           ECG/EMG Results (most recent)     Procedure Component Value Units Date/Time    ECG 12 Lead [[id number]] Collected:  10/01/19 1634     Updated:  10/02/19 0912    Narrative:       Test Reason : Baseline Cardiac Status  Blood Pressure : **/** mmHG  Vent. Rate : 068 BPM     Atrial Rate : 068 BPM     P-R Int : 136 ms          QRS Dur : 076 ms      QT Int : 388 ms       P-R-T Axes : 071 075 067 degrees     QTc Int : 412 ms    Normal sinus rhythm  Normal ECG  No previous ECGs available  Confirmed by Michael Major ([id number]) on 10/2/2019 9:12:22 AM    Referred By:  DEMI           Confirmed By:Michael Major           ALLERGIES: Patient has no known allergies.      Current Facility-Administered Medications:   •  aluminum-magnesium hydroxide-simethicone (MAALOX MAX) 400-400-40 MG/5ML suspension 15 mL, 15 mL, Oral, Q6H PRN, Lloyd Barone MD  •  benzonatate (TESSALON) capsule 100 mg, 100 mg, Oral, TID PRN, Lloyd Barone MD  •  famotidine (PEPCID) tablet 20 mg, 20 mg, Oral, BID PRN, Lloyd Barone MD  •  hydrOXYzine (ATARAX) tablet 25 mg, 25 mg, Oral, Q6H PRN, Lloyd Barone MD  •  ibuprofen (ADVIL,MOTRIN) tablet 400 mg, 400 mg, Oral, Q6H PRN, Lloyd Barone MD, 400 mg at 10/03/19 0320  •  loperamide (IMODIUM) capsule 2 mg, 2 mg, Oral, Q2H PRN, Lloyd Barone MD  •  magnesium hydroxide (MILK OF MAGNESIA) suspension 2400 mg/10mL 10 mL, 10 mL, Oral, Daily PRN, Lloyd Barone MD  •  nicotine (NICODERM CQ) 21 MG/24HR patch 1 patch, 1 patch, Transdermal, Q24H, Lloyd Barone MD, 1 patch at 10/03/19 0815  •  OLANZapine zydis (zyPREXA) disintegrating tablet 5 mg, 5 mg, Oral, TID PRN, Lloyd Barone MD  •  ondansetron (ZOFRAN) tablet 4 mg, 4 mg, Oral, Q6H PRN, Lloyd Barone MD  •  risperiDONE (risperDAL) tablet 2 mg, 2 mg, Oral, Q12H, Lloyd Barone  MD Mark Anthony, 2 mg at 10/03/19 0815  •  sodium chloride nasal spray 2 spray, 2 spray, Each Nare, PRN, Lloyd Barone MD  •  traZODone (DESYREL) tablet 50 mg, 50 mg, Oral, Nightly PRN, Lloyd Barone MD, 50 mg at 10/01/19 2040    ASSESSMENT & PLAN      Chronic residual schizophrenia with acute exacerbations (CMS/HCC)  -We will continue with the Risperdol in a more therapeutic dosage aiming towards Depo format.  Individual and group psychotherapy    Essential hypertension  -Normotensive at this time, will monitor.    Tobacco abuse  - NRT plus encouraging her to stop smoking.     Methamphetamine abuse (CMS/HCC)  - will incorporate Recovery principles in the psychotherapeutic effort.     Anemia, iron deficiency  -Will evaluate further.      Suicide precautions: Suicide precaution Level 3 (q15 min checks)     Behavioral Health Treatment Plan and Problem List: I have reviewed and approved the Behavioral Health Treatment Plan and Problem list.    I spent a total of 30 minutes in direct patient care including face to face examination. 18 minutes were spent in coordination of care, and counseling the patient on the current and follow-up treatment plans regarding her situation and psychosis. Answered patient questions regarding the treatment plan.    DIOGO Barone MD    Clinician:  Lloyd Barone MD  10/03/19  9:14 AM    Dictated utilizing Dragon dictation

## 2019-10-03 NOTE — PROGRESS NOTES
Patient has been scheduled the following appointment:     [facility] [place]  [address]  [place] [address]  [phone]    October 8 2019 at 2:00pm with Pooja.         Oct 15, 2019  4:30 PM EDT  Medicine Check with RICARDO Cárdenas  [facility] (--) [address] [facility] [address]  [phone]

## 2019-10-04 LAB
HCT VFR BLD AUTO: 30.8 % (ref 34–46.6)
HGB BLD-MCNC: 9.3 G/DL (ref 12–15.9)
RETICS # AUTO: 0.07 10*6/MM3 (ref 0.02–0.13)
RETICS/RBC NFR AUTO: 1.74 % (ref 0.7–1.9)

## 2019-10-04 PROCEDURE — 99232 SBSQ HOSP IP/OBS MODERATE 35: CPT | Performed by: PSYCHIATRY & NEUROLOGY

## 2019-10-04 PROCEDURE — 85018 HEMOGLOBIN: CPT | Performed by: PSYCHIATRY & NEUROLOGY

## 2019-10-04 PROCEDURE — 85014 HEMATOCRIT: CPT | Performed by: PSYCHIATRY & NEUROLOGY

## 2019-10-04 PROCEDURE — 85045 AUTOMATED RETICULOCYTE COUNT: CPT | Performed by: PSYCHIATRY & NEUROLOGY

## 2019-10-04 RX ADMIN — IBUPROFEN 400 MG: 400 TABLET, FILM COATED ORAL at 01:24

## 2019-10-04 RX ADMIN — NICOTINE 1 PATCH: 21 PATCH TRANSDERMAL at 09:05

## 2019-10-04 RX ADMIN — IBUPROFEN 400 MG: 400 TABLET, FILM COATED ORAL at 18:09

## 2019-10-04 RX ADMIN — RISPERIDONE 2 MG: 2 TABLET, FILM COATED ORAL at 09:04

## 2019-10-04 RX ADMIN — IBUPROFEN 400 MG: 400 TABLET, FILM COATED ORAL at 09:05

## 2019-10-04 RX ADMIN — FERROUS SULFATE TAB 325 MG (65 MG ELEMENTAL FE) 325 MG: 325 (65 FE) TAB at 09:04

## 2019-10-04 RX ADMIN — ESCITALOPRAM OXALATE 10 MG: 10 TABLET ORAL at 09:04

## 2019-10-04 RX ADMIN — RISPERIDONE 2 MG: 2 TABLET, FILM COATED ORAL at 20:28

## 2019-10-04 NOTE — PROGRESS NOTES
"INPATIENT PSYCHIATRIC PROGRESS NOTE    Name:  Jazzy Valle  :  1975  MRN:  [id number]  Visit Number:  [id number]  Length of stay:  3    Behavioral Health Treatment Plan and Problem List: I have reviewed and approved the Behavioral Health Treatment Plan and Problem list.    SUBJECTIVE    CC/ Focus of exam: psychosis.     Patient's subjective status: \"doing ok\"     INTERVAL HISTORY: reports her auditory hallucinations are \"almost gone\". Still concerned about \"the microchip implants\".   Discussed the depo Risperdal option again with the patient, will aim for Monday.   Patient unsure about her BF Sudeep (in trouble with Meth).     Depression rating 2/10  Anxiety rating 0/10  Sleep: slept well    Craving: \"Just cigarettes\"        Review of Systems   Respiratory: Negative.    Cardiovascular: Negative.    Gastrointestinal: Negative.    Episode of orthostatic hypotension yesterday evening.   No stool as yet to check for O.B.    OBJECTIVE    Temp:  [97.9 °F (36.6 °C)-98 °F (36.7 °C)] 98 °F (36.7 °C)  Heart Rate:  [82-99] 82  Resp:  [18-20] 18  BP: (113-144)/(68-93) 113/68    MENTAL STATUS EXAM:      Appearance:Casually dressed, good hygeine.   Cooperation:Cooperative  Psychomotor: No psychomotor agitation/retardation, No EPS, No motor tics  Speech-normal rate, amount.   Mood/Affect:  Blunted  Thought Processes:  coherent  Thought Content:  bizarre  Hallucination(s): auditory  Hopelessness: No  Optimistic:minimally  Suicidal Thoughts:   No  Suicidal Plan/Intent:  No  Homicidal Thoughts:  absent  Orientation: oriented x 3  Memory: recent intact    Lab Results (last 24 hours)     Procedure Component Value Units Date/Time    Hemoglobin & Hematocrit, Blood [[id number]]  (Abnormal) Collected:  10/04/19 0208    Specimen:  Blood Updated:  10/04/19 0220     Hemoglobin 9.3 g/dL      Hematocrit 30.8 %            Imaging Results (last 24 hours)     ** No results found for the last 24 hours. **           ECG/EMG Results (most " recent)     Procedure Component Value Units Date/Time    ECG 12 Lead [[id number]] Collected:  10/01/19 1634     Updated:  10/02/19 0912    Narrative:       Test Reason : Baseline Cardiac Status  Blood Pressure : **/** mmHG  Vent. Rate : 068 BPM     Atrial Rate : 068 BPM     P-R Int : 136 ms          QRS Dur : 076 ms      QT Int : 388 ms       P-R-T Axes : 071 075 067 degrees     QTc Int : 412 ms    Normal sinus rhythm  Normal ECG  No previous ECGs available  Confirmed by Michael Major ([id number]) on 10/2/2019 9:12:22 AM    Referred By:  DEMI           Confirmed By:Michael Major           ALLERGIES: Patient has no known allergies.      Current Facility-Administered Medications:   •  aluminum-magnesium hydroxide-simethicone (MAALOX MAX) 400-400-40 MG/5ML suspension 15 mL, 15 mL, Oral, Q6H PRN, Lloyd Barone MD  •  benzonatate (TESSALON) capsule 100 mg, 100 mg, Oral, TID PRN, Lloyd Barone MD  •  escitalopram (LEXAPRO) tablet 10 mg, 10 mg, Oral, Daily, Lloyd Barone MD, 10 mg at 10/03/19 1021  •  famotidine (PEPCID) tablet 20 mg, 20 mg, Oral, BID PRN, Lloyd Barone MD  •  ferrous sulfate tablet 325 mg, 325 mg, Oral, Daily With Breakfast, Lloyd Barone MD, 325 mg at 10/03/19 1021  •  hydrOXYzine (ATARAX) tablet 25 mg, 25 mg, Oral, Q6H PRN, Lloyd Barone MD  •  ibuprofen (ADVIL,MOTRIN) tablet 400 mg, 400 mg, Oral, Q6H PRN, Lloyd Barone MD, 400 mg at 10/04/19 0124  •  loperamide (IMODIUM) capsule 2 mg, 2 mg, Oral, Q2H PRN, Lloyd Barone MD  •  magnesium hydroxide (MILK OF MAGNESIA) suspension 2400 mg/10mL 10 mL, 10 mL, Oral, Daily PRN, Lloyd Barone MD  •  nicotine (NICODERM CQ) 21 MG/24HR patch 1 patch, 1 patch, Transdermal, Q24H, Lloyd Barone MD, 1 patch at 10/03/19 0815  •  OLANZapine zydis (zyPREXA) disintegrating tablet 5 mg, 5 mg, Oral, TID PRN, Lloyd Barone MD  •  ondansetron  (ZOFRAN) tablet 4 mg, 4 mg, Oral, Q6H PRN, Lloyd Barone MD  •  risperiDONE (risperDAL) tablet 2 mg, 2 mg, Oral, Q12H, Lloyd Barone MD, 2 mg at 10/03/19 2115  •  sodium chloride nasal spray 2 spray, 2 spray, Each Nare, PRN, Lloyd Barone MD    ASSESSMENT & PLAN     Chronic residual schizophrenia with acute exacerbations (CMS/HCC)  -We will continue with the Risperdol in a more therapeutic dosage aiming towards Depo format.  Individual and group psychotherapy    Essential hypertension  -Normotensive at this time, will monitor.    Tobacco abuse  - NRT plus encouraging her to stop smoking.     Methamphetamine abuse (CMS/HCC)  - will incorporate Recovery principles in the psychotherapeutic effort.     Anemia, iron deficiency  -Will evaluate further.      Suicide precautions: Suicide precaution Level 3 (q15 min checks)     Behavioral Health Treatment Plan and Problem List: I have reviewed and approved the Behavioral Health Treatment Plan and Problem list.    I spent a total of 30 minutes in direct patient care including face to face examination. 17 minutes were spent in coordination of care, and counseling the patient on the current and follow-up treatment plans regarding psychosis and medication. Answered patient questions regarding the medication.    DIOGO Barone MD    Clinician:  Lloyd Barone MD  10/04/19  8:48 AM    Dictated utilizing Dragon dictation

## 2019-10-04 NOTE — PLAN OF CARE
Problem: Patient Care Overview  Goal: Plan of Care Review  Outcome: Ongoing (interventions implemented as appropriate)  Patient quiet and withdrawn. Stated she felt depressed and anxious because she wanted to be discharged. Got up during night and felt dizzy while standing at nurses station and pitched forward hitting right side of face on the wall. RN Cecilia was present and eased her to floor. Patient denied any injury or pain. No visible injury noted. BP was 80/50. Dr. Valdes notified of patient incident.    10/04/19 0459   Coping/Psychosocial   Plan of Care Reviewed With patient   Coping/Psychosocial   Patient Agreement with Plan of Care agrees   Plan of Care Review   Progress no change                                   Problem: Overarching Goals (Adult)  Goal: Adheres to Safety Considerations for Self and Others  Outcome: Ongoing (interventions implemented as appropriate)    Goal: Optimized Coping Skills in Response to Life Stressors  Outcome: Ongoing (interventions implemented as appropriate)    Goal: Develops/Participates in Therapeutic [place] to Support Successful Transition  Outcome: Ongoing (interventions implemented as appropriate)

## 2019-10-04 NOTE — PLAN OF CARE
Problem: Patient Care Overview  Goal: Plan of Care Review  Outcome: Ongoing (interventions implemented as appropriate)    Goal: Individualization and Mutuality  Outcome: Ongoing (interventions implemented as appropriate)    Goal: Discharge Needs Assessment  Outcome: Ongoing (interventions implemented as appropriate)    Goal: Interprofessional Rounds/Family Conf  Outcome: Ongoing (interventions implemented as appropriate)      Problem: Overarching Goals (Adult)  Goal: Adheres to Safety Considerations for Self and Others  Outcome: Ongoing (interventions implemented as appropriate)    Goal: Optimized Coping Skills in Response to Life Stressors  Outcome: Ongoing (interventions implemented as appropriate)    Goal: Develops/Participates in Therapeutic [place] to Support Successful Transition  Outcome: Ongoing (interventions implemented as appropriate)

## 2019-10-04 NOTE — NURSING NOTE
Pt. had been in her bathroom, attempting to have a bowel movement. Walked out of her room to nurses station, stood for a few seconds, then pitched folrward striking her face on the wall near the nurses station. JULISSA Brooks in hallway and witnessed patient doing this and lowered patient to the floor.  Patient had no marks, redness or swelling on her face. No other injuries noted. Patient denied any injury or pain from fall. Was alert and oriented and stated she just became dizzy. BP 80/50, P - 88, R - 18, O2 sat - 96%.

## 2019-10-04 NOTE — PROGRESS NOTES
0066:    0920:      DATA:         Therapist met individually this date and staffed case with Dr. Barone. Patient reports that she is staying in the hospital the weekend and denies concerns.  Therapist asked patient to complete a safety plan and provided this to patient this date.      Clinical Maneuvering/Intervention:     Therapist assisted patient in processing above session content; acknowledged and normalized patient’s thoughts, feelings, and concerns.  Discussed the therapist/patient relationship and explain the parameters and limitations of relative confidentiality.  Also discussed the importance active participation, and honesty to the treatment process.  Encouraged the patient to discuss/vent her feelings, frustrations, and fears concerning her ongoing medical issues and validated her feelings.     Discussed the importance of finding enjoyable activities and coping skills that the patient can engage in a regular basis. Discussed healthy coping skills such as distraction, self love, grounding, thought challenges/reframing, etc.  Provided patient with list of healthy coping skills this date. Discussed the importance of medication compliance.  Praised the patient for seeking help and spent the majority of the session building rapport.       Allowed patient to freely discuss issues without interruption or judgment. Provided safe, confidential environment to facilitate the development of positive therapeutic relationship and encourage open, honest communication.      Therapist addressed discharge safety planning this date. Assisted patient in identifying risk factors which would indicate the need for higher level of care after discharge;  including thoughts to harm self or others and/or self-harming behavior. Encouraged patient to call 911, or present to the nearest emergency room should any of these events occur. Discussed crisis intervention services and means to access.  Encouraged securing any objects of  harm.          ASSESSMENT:      The patient was seen for follow up of Schizophrenia and Methamphetamine abuse. Today, patient was seen in day room.  She continues to be somewhat guarded with therapist, but reports that she is agreeable to Depo injection and that she may get to return home Monday if all goes well.  Patient denies SI/HI acute symptoms.  Patient continues to have bizarre thought content; still concerned about a micro chip being implanted in her brain.      PLAN:       Patient to remain hospitalized this date.      Treatment team will focus efforts on stabilizing patient's acute symptoms while providing education on healthy coping and crisis management to reduce hospitalizations.   Patient requires daily psychiatrist evaluation and 24/7 nursing supervision to promote patient  safety.     Therapist will offer 1-4 individual sessions (20-30 minutes each), 1 therapy group daily, family education, and appropriate referral.     Therapist recommends residential referral. Patient refuses and requests [facility] referral. She is also agreeable to [facility] and case management.

## 2019-10-05 LAB — HEMOCCULT STL QL: NEGATIVE

## 2019-10-05 PROCEDURE — 99231 SBSQ HOSP IP/OBS SF/LOW 25: CPT | Performed by: PSYCHIATRY & NEUROLOGY

## 2019-10-05 PROCEDURE — 82272 OCCULT BLD FECES 1-3 TESTS: CPT | Performed by: PSYCHIATRY & NEUROLOGY

## 2019-10-05 RX ADMIN — HYDROXYZINE HYDROCHLORIDE 25 MG: 25 TABLET, FILM COATED ORAL at 22:36

## 2019-10-05 RX ADMIN — ESCITALOPRAM OXALATE 10 MG: 10 TABLET ORAL at 08:30

## 2019-10-05 RX ADMIN — NICOTINE 1 PATCH: 21 PATCH TRANSDERMAL at 08:30

## 2019-10-05 RX ADMIN — IBUPROFEN 400 MG: 400 TABLET, FILM COATED ORAL at 09:54

## 2019-10-05 RX ADMIN — HYDROXYZINE HYDROCHLORIDE 25 MG: 25 TABLET, FILM COATED ORAL at 14:42

## 2019-10-05 RX ADMIN — FERROUS SULFATE TAB 325 MG (65 MG ELEMENTAL FE) 325 MG: 325 (65 FE) TAB at 08:30

## 2019-10-05 RX ADMIN — IBUPROFEN 400 MG: 400 TABLET, FILM COATED ORAL at 21:02

## 2019-10-05 RX ADMIN — IBUPROFEN 400 MG: 400 TABLET, FILM COATED ORAL at 15:40

## 2019-10-05 RX ADMIN — ALUMINUM HYDROXIDE, MAGNESIUM HYDROXIDE, AND DIMETHICONE 15 ML: 400; 400; 40 SUSPENSION ORAL at 09:54

## 2019-10-05 RX ADMIN — IBUPROFEN 400 MG: 400 TABLET, FILM COATED ORAL at 04:25

## 2019-10-05 RX ADMIN — RISPERIDONE 2 MG: 2 TABLET, FILM COATED ORAL at 08:30

## 2019-10-05 RX ADMIN — RISPERIDONE 2 MG: 2 TABLET, FILM COATED ORAL at 21:02

## 2019-10-05 NOTE — PLAN OF CARE
Problem: Patient Care Overview  Goal: Plan of Care Review  Outcome: Ongoing (interventions implemented as appropriate)    Goal: Individualization and Mutuality  Outcome: Ongoing (interventions implemented as appropriate)    Goal: Discharge Needs Assessment  Outcome: Ongoing (interventions implemented as appropriate)    Goal: Interprofessional Rounds/Family Conf  Outcome: Ongoing (interventions implemented as appropriate)      Problem: Overarching Goals (Adult)  Goal: Adheres to Safety Considerations for Self and Others  Outcome: Ongoing (interventions implemented as appropriate)    Goal: Optimized Coping Skills in Response to Life Stressors  Outcome: Ongoing (interventions implemented as appropriate)    Goal: Develops/Participates in Therapeutic [place] to Support Successful Transition  Outcome: Ongoing (interventions implemented as appropriate)

## 2019-10-05 NOTE — PROGRESS NOTES
"INPATIENT PSYCHIATRIC PROGRESS NOTE    Name:  Jazzy Valle  :  1975  MRN:  [id number]  Visit Number:  [id number]  Length of stay:  4    Behavioral Health Treatment Plan and Problem List: I have reviewed and approved the Behavioral Health Treatment Plan and Problem list.    SUBJECTIVE    CC/ Focus of exam: psychosis.     Patient's subjective status: \"good\"     INTERVAL HISTORY: Patient reports doing well today.  She denies any major issues with mood or anxiety.  She denies any side effects from medications.  She continues to have some constipation. She denies SI/HI/AVH.     Depression rating 2/10  Anxiety rating 0/10  Sleep: slept well    Craving: \"Just cigarettes\"        Review of Systems   Respiratory: Negative.    Cardiovascular: Negative.    Gastrointestinal: Negative.    Episode of orthostatic hypotension yesterday evening.   No stool as yet to check for O.B.    OBJECTIVE    Temp:  [97.8 °F (36.6 °C)-97.9 °F (36.6 °C)] 97.8 °F (36.6 °C)  Heart Rate:  [75-89] 75  Resp:  [18] 18  BP: (108-131)/(66-90) 108/66    MENTAL STATUS EXAM:      Appearance:Casually dressed, good hygeine.   Cooperation:Cooperative  Psychomotor: No psychomotor agitation/retardation, No EPS, No motor tics  Speech-normal rate, amount.   Mood/Affect:  Blunted  Thought Processes:  coherent  Thought Content:  normal  Hallucination(s): auditory and none  Hopelessness: No  Optimistic:minimally  Suicidal Thoughts:   No  Suicidal Plan/Intent:  No  Homicidal Thoughts:  absent  Orientation: oriented x 3  Memory: recent intact    Lab Results (last 24 hours)     ** No results found for the last 24 hours. **           Imaging Results (last 24 hours)     ** No results found for the last 24 hours. **           ECG/EMG Results (most recent)     Procedure Component Value Units Date/Time    ECG 12 Lead [[id number]] Collected:  10/01/19 1634     Updated:  10/02/19 0912    Narrative:       Test Reason : Baseline Cardiac Status  Blood Pressure : **/** " mmHG  Vent. Rate : 068 BPM     Atrial Rate : 068 BPM     P-R Int : 136 ms          QRS Dur : 076 ms      QT Int : 388 ms       P-R-T Axes : 071 075 067 degrees     QTc Int : 412 ms    Normal sinus rhythm  Normal ECG  No previous ECGs available  Confirmed by Michael Major ([id number]) on 10/2/2019 9:12:22 AM    Referred By:  DEMI           Confirmed By:Michael Major           ALLERGIES: Patient has no known allergies.      Current Facility-Administered Medications:   •  aluminum-magnesium hydroxide-simethicone (MAALOX MAX) 400-400-40 MG/5ML suspension 15 mL, 15 mL, Oral, Q6H PRN, Lloyd Barone MD, 15 mL at 10/05/19 0954  •  benzonatate (TESSALON) capsule 100 mg, 100 mg, Oral, TID PRN, Lloyd Barone MD  •  escitalopram (LEXAPRO) tablet 10 mg, 10 mg, Oral, Daily, Lloyd Barone MD, 10 mg at 10/05/19 0830  •  famotidine (PEPCID) tablet 20 mg, 20 mg, Oral, BID PRN, Lloyd Barone MD  •  ferrous sulfate tablet 325 mg, 325 mg, Oral, Daily With Breakfast, Lloyd Barone MD, 325 mg at 10/05/19 0830  •  hydrOXYzine (ATARAX) tablet 25 mg, 25 mg, Oral, Q6H PRN, Lloyd Barone MD  •  ibuprofen (ADVIL,MOTRIN) tablet 400 mg, 400 mg, Oral, Q6H PRN, Lloyd Barone MD, 400 mg at 10/05/19 0954  •  loperamide (IMODIUM) capsule 2 mg, 2 mg, Oral, Q2H PRN, Lloyd Barone MD  •  magnesium hydroxide (MILK OF MAGNESIA) suspension 2400 mg/10mL 10 mL, 10 mL, Oral, Daily PRN, Lloyd Barone MD  •  nicotine (NICODERM CQ) 21 MG/24HR patch 1 patch, 1 patch, Transdermal, Q24H, Lloyd Barone MD, 1 patch at 10/05/19 0830  •  OLANZapine zydis (zyPREXA) disintegrating tablet 5 mg, 5 mg, Oral, TID PRN, Lloyd Barone MD  •  ondansetron (ZOFRAN) tablet 4 mg, 4 mg, Oral, Q6H PRN, Lloyd Barone MD  •  risperiDONE (risperDAL) tablet 2 mg, 2 mg, Oral, Q12H, Lloyd Barone MD, 2 mg at 10/05/19 0830  •  sodium  chloride nasal spray 2 spray, 2 spray, Each Nare, Lizabeth MERCEDES Charles William, MD    ASSESSMENT & PLAN     Chronic residual schizophrenia with acute exacerbations (CMS/HCC)  -We will continue with the Risperdol in a more therapeutic dosage aiming towards Depo format.  Individual and group psychotherapy    Essential hypertension  -Normotensive at this time, will monitor.    Tobacco abuse  - NRT plus encouraging her to stop smoking.     Methamphetamine abuse (CMS/HCC)  - will incorporate Recovery principles in the psychotherapeutic effort.     Anemia, iron deficiency  -Will evaluate further.      Suicide precautions: Suicide precaution Level 3 (q15 min checks)     Behavioral Health Treatment Plan and Problem List: I have reviewed and approved the Behavioral Health Treatment Plan and Problem list.        Clinician:  Naldo Murphy MD  10/05/19  12:32 PM    Dictated utilizing Dragon dictation

## 2019-10-05 NOTE — PLAN OF CARE
Problem: Patient Care Overview  Goal: Plan of Care Review  Outcome: Ongoing (interventions implemented as appropriate)   10/05/19 0419   Coping/Psychosocial   Plan of Care Reviewed With patient   Coping/Psychosocial   Patient Agreement with Plan of Care agrees   Plan of Care Review   Progress no change   OTHER   Outcome Summary Patient calm and cooperative. Patient is withdrawn and avoids social interaction. Denies any anxiety, depression, SI/HI, or hallucinations.        Problem: Overarching Goals (Adult)  Goal: Adheres to Safety Considerations for Self and Others  Outcome: Ongoing (interventions implemented as appropriate)    Goal: Optimized Coping Skills in Response to Life Stressors  Outcome: Ongoing (interventions implemented as appropriate)    Goal: Develops/Participates in Therapeutic [place] to Support Successful Transition  Outcome: Ongoing (interventions implemented as appropriate)

## 2019-10-06 PROCEDURE — 99231 SBSQ HOSP IP/OBS SF/LOW 25: CPT | Performed by: PSYCHIATRY & NEUROLOGY

## 2019-10-06 RX ADMIN — RISPERIDONE 2 MG: 2 TABLET, FILM COATED ORAL at 08:24

## 2019-10-06 RX ADMIN — HYDROXYZINE HYDROCHLORIDE 25 MG: 25 TABLET, FILM COATED ORAL at 15:45

## 2019-10-06 RX ADMIN — HYDROXYZINE HYDROCHLORIDE 25 MG: 25 TABLET, FILM COATED ORAL at 22:29

## 2019-10-06 RX ADMIN — ESCITALOPRAM OXALATE 10 MG: 10 TABLET ORAL at 08:24

## 2019-10-06 RX ADMIN — FERROUS SULFATE TAB 325 MG (65 MG ELEMENTAL FE) 325 MG: 325 (65 FE) TAB at 08:24

## 2019-10-06 RX ADMIN — RISPERIDONE 2 MG: 2 TABLET, FILM COATED ORAL at 20:29

## 2019-10-06 RX ADMIN — IBUPROFEN 400 MG: 400 TABLET, FILM COATED ORAL at 20:29

## 2019-10-06 RX ADMIN — IBUPROFEN 400 MG: 400 TABLET, FILM COATED ORAL at 08:24

## 2019-10-06 RX ADMIN — IBUPROFEN 400 MG: 400 TABLET, FILM COATED ORAL at 14:24

## 2019-10-06 RX ADMIN — NICOTINE 1 PATCH: 21 PATCH TRANSDERMAL at 08:24

## 2019-10-06 NOTE — PLAN OF CARE
Problem: Patient Care Overview  Goal: Plan of Care Review  Outcome: Ongoing (interventions implemented as appropriate)    Goal: Individualization and Mutuality  Outcome: Ongoing (interventions implemented as appropriate)    Goal: Discharge Needs Assessment  Outcome: Ongoing (interventions implemented as appropriate)    Goal: Interprofessional Rounds/Family Conf  Outcome: Ongoing (interventions implemented as appropriate)      Problem: Overarching Goals (Adult)  Goal: Adheres to Safety Considerations for Self and Others  Outcome: Ongoing (interventions implemented as appropriate)    Goal: Optimized Coping Skills in Response to Life Stressors  Outcome: Ongoing (interventions implemented as appropriate)    Goal: Develops/Participates in Therapeutic [place] to Support Successful Transition  Outcome: Ongoing (interventions implemented as appropriate)

## 2019-10-06 NOTE — PLAN OF CARE
Problem: Patient Care Overview  Goal: Plan of Care Review  Outcome: Ongoing (interventions implemented as appropriate)  Denied anxiety, depression and S.I. Out of room during evening shift, but has little to say to her peers. Focuses on being discharged. Has appeared to sleep well this shift.   10/06/19 0417   Coping/Psychosocial   Plan of Care Reviewed With patient   Coping/Psychosocial   Patient Agreement with Plan of Care agrees   Plan of Care Review   Progress improving       Problem: Overarching Goals (Adult)  Goal: Adheres to Safety Considerations for Self and Others  Outcome: Ongoing (interventions implemented as appropriate)    Goal: Optimized Coping Skills in Response to Life Stressors  Outcome: Ongoing (interventions implemented as appropriate)    Goal: Develops/Participates in Therapeutic [place] to Support Successful Transition  Outcome: Ongoing (interventions implemented as appropriate)

## 2019-10-06 NOTE — PROGRESS NOTES
"INPATIENT PSYCHIATRIC PROGRESS NOTE    Name:  Jazzy Valle  :  1975  MRN:  [id number]  Visit Number:  [id number]  Length of stay:  5    Behavioral Health Treatment Plan and Problem List: I have reviewed and approved the Behavioral Health Treatment Plan and Problem list.    SUBJECTIVE    CC/ Focus of exam: psychosis.     Patient's subjective status: \"good\"     INTERVAL HISTORY: Patient reports doing fine again today.  She denies any major issues with mood or anxiety and affect bili has improved.  She denies any side effects from medications.  She was able to have a bowel movement yesterday after laxative use.  She is anticipating discharge and will likely be appropriate early next week for discharge.  She denies SI/HI/AVH.     Depression rating 2/10  Anxiety rating 0/10  Sleep: slept well    Craving: \"smoking\"        Review of Systems   Respiratory: Negative.    Cardiovascular: Negative.    Gastrointestinal: Negative.    Episode of orthostatic hypotension yesterday evening.   No stool as yet to check for O.B.    OBJECTIVE    Temp:  [98 °F (36.7 °C)-98.2 °F (36.8 °C)] 98.2 °F (36.8 °C)  Heart Rate:  [] 70  Resp:  [15-18] 18  BP: (115-142)/(65-89) 115/65    MENTAL STATUS EXAM:      Appearance:Casually dressed, good hygeine.   Cooperation:Cooperative  Psychomotor: No psychomotor agitation/retardation, No EPS, No motor tics  Speech-normal rate, amount.   Mood/Affect:  Appropriate  Thought Processes:  coherent  Thought Content:  normal  Hallucination(s): none  Hopelessness: No  Optimistic:Yes  Suicidal Thoughts:   No  Suicidal Plan/Intent:  No  Homicidal Thoughts:  absent  Orientation: oriented x 3  Memory: recent intact    Lab Results (last 24 hours)     Procedure Component Value Units Date/Time    Occult Blood X 1, Stool - Stool, Per Rectum [[id number]]  (Normal) Collected:  10/05/19 1538    Specimen:  Stool from Per Rectum Updated:  10/05/19 1720     Fecal Occult Blood Negative           Imaging Results " (last 24 hours)     ** No results found for the last 24 hours. **           ECG/EMG Results (most recent)     Procedure Component Value Units Date/Time    ECG 12 Lead [[id number]] Collected:  10/01/19 1634     Updated:  10/02/19 0912    Narrative:       Test Reason : Baseline Cardiac Status  Blood Pressure : **/** mmHG  Vent. Rate : 068 BPM     Atrial Rate : 068 BPM     P-R Int : 136 ms          QRS Dur : 076 ms      QT Int : 388 ms       P-R-T Axes : 071 075 067 degrees     QTc Int : 412 ms    Normal sinus rhythm  Normal ECG  No previous ECGs available  Confirmed by Michael Major ([id number]) on 10/2/2019 9:12:22 AM    Referred By:  DEMI           Confirmed By:Michael Mjaor           ALLERGIES: Patient has no known allergies.      Current Facility-Administered Medications:   •  aluminum-magnesium hydroxide-simethicone (MAALOX MAX) 400-400-40 MG/5ML suspension 15 mL, 15 mL, Oral, Q6H PRN, Lloyd Barone MD, 15 mL at 10/05/19 0954  •  benzonatate (TESSALON) capsule 100 mg, 100 mg, Oral, TID PRN, Lloyd Barone MD  •  escitalopram (LEXAPRO) tablet 10 mg, 10 mg, Oral, Daily, Lloyd Barone MD, 10 mg at 10/06/19 0824  •  famotidine (PEPCID) tablet 20 mg, 20 mg, Oral, BID PRN, Lloyd Barone MD  •  ferrous sulfate tablet 325 mg, 325 mg, Oral, Daily With Breakfast, Lloyd Barone MD, 325 mg at 10/06/19 0824  •  hydrOXYzine (ATARAX) tablet 25 mg, 25 mg, Oral, Q6H PRN, Lloyd Barone MD, 25 mg at 10/05/19 2236  •  ibuprofen (ADVIL,MOTRIN) tablet 400 mg, 400 mg, Oral, Q6H PRN, Lloyd Barone MD, 400 mg at 10/06/19 0824  •  loperamide (IMODIUM) capsule 2 mg, 2 mg, Oral, Q2H PRN, Lloyd Barone MD  •  magnesium hydroxide (MILK OF MAGNESIA) suspension 2400 mg/10mL 10 mL, 10 mL, Oral, Daily PRN, Lloyd Barone MD  •  nicotine (NICODERM CQ) 21 MG/24HR patch 1 patch, 1 patch, Transdermal, Q24H, Lloyd Barone MD, 1 patch  at 10/06/19 0824  •  OLANZapine zydis (zyPREXA) disintegrating tablet 5 mg, 5 mg, Oral, TID PRN, Lloyd Barone MD  •  ondansetron (ZOFRAN) tablet 4 mg, 4 mg, Oral, Q6H PRN, Lloyd Barone MD  •  risperiDONE (risperDAL) tablet 2 mg, 2 mg, Oral, Q12H, Lloyd Barone MD, 2 mg at 10/06/19 0824  •  sodium chloride nasal spray 2 spray, 2 spray, Each Nare, PRN, Lloyd Barone MD    ASSESSMENT & PLAN     Chronic residual schizophrenia with acute exacerbations (CMS/HCC)  -We will continue with the Risperdol in a more therapeutic dosage aiming towards Depo format.  Individual and group psychotherapy    Essential hypertension  -Normotensive at this time, will monitor.    Tobacco abuse  - NRT plus encouraging her to stop smoking.     Methamphetamine abuse (CMS/HCC)  - will incorporate Recovery principles in the psychotherapeutic effort.     Anemia, iron deficiency  -Will evaluate further.  -Fecal Occult Blood reviewed and negative       Suicide precautions: Suicide precaution Level 3 (q15 min checks)     Behavioral Health Treatment Plan and Problem List: I have reviewed and approved the Behavioral Health Treatment Plan and Problem list.        Clinician:  Naldo Murphy MD  10/06/19  11:46 AM    Dictated utilizing Dragon dictation

## 2019-10-07 VITALS
HEART RATE: 73 BPM | BODY MASS INDEX: 31.08 KG/M2 | DIASTOLIC BLOOD PRESSURE: 60 MMHG | WEIGHT: 175.4 LBS | OXYGEN SATURATION: 99 % | RESPIRATION RATE: 18 BRPM | SYSTOLIC BLOOD PRESSURE: 101 MMHG | HEIGHT: 63 IN | TEMPERATURE: 97.6 F

## 2019-10-07 PROCEDURE — 99239 HOSP IP/OBS DSCHRG MGMT >30: CPT | Performed by: PSYCHIATRY & NEUROLOGY

## 2019-10-07 RX ORDER — RISPERIDONE 2 MG/1
TABLET ORAL
Qty: 30 TABLET | Refills: 0 | Status: SHIPPED | OUTPATIENT
Start: 2019-10-07 | End: 2019-11-11 | Stop reason: SDUPTHER

## 2019-10-07 RX ORDER — FERROUS SULFATE 325(65) MG
325 TABLET ORAL
Qty: 100 TABLET | Refills: 0 | Status: SHIPPED | OUTPATIENT
Start: 2019-10-07 | End: 2020-12-22

## 2019-10-07 RX ORDER — ESCITALOPRAM OXALATE 20 MG/1
TABLET ORAL
Qty: 30 TABLET | Refills: 0
Start: 2019-10-07 | End: 2019-11-05 | Stop reason: SDUPTHER

## 2019-10-07 RX ADMIN — FERROUS SULFATE TAB 325 MG (65 MG ELEMENTAL FE) 325 MG: 325 (65 FE) TAB at 08:17

## 2019-10-07 RX ADMIN — IBUPROFEN 400 MG: 400 TABLET, FILM COATED ORAL at 08:17

## 2019-10-07 RX ADMIN — NICOTINE 1 PATCH: 21 PATCH TRANSDERMAL at 08:17

## 2019-10-07 RX ADMIN — ESCITALOPRAM OXALATE 10 MG: 10 TABLET ORAL at 08:17

## 2019-10-07 RX ADMIN — RISPERIDONE 2 MG: 2 TABLET, FILM COATED ORAL at 08:17

## 2019-10-07 NOTE — PLAN OF CARE
Problem: Patient Care Overview  Goal: Plan of Care Review  Outcome: Ongoing (interventions implemented as appropriate)   10/06/19 2043   Coping/Psychosocial   Plan of Care Reviewed With patient   Coping/Psychosocial   Patient Agreement with Plan of Care agrees   Plan of Care Review   Progress no change   OTHER   Outcome Summary Patient calm and cooperative. Patient is withdrawn and avoids social interaction. She rates anxiety 2/10. She denies depression, si/hi, avh.        Problem: Overarching Goals (Adult)  Goal: Adheres to Safety Considerations for Self and Others  Outcome: Ongoing (interventions implemented as appropriate)    Goal: Optimized Coping Skills in Response to Life Stressors  Outcome: Ongoing (interventions implemented as appropriate)    Goal: Develops/Participates in Therapeutic [place] to Support Successful Transition  Outcome: Ongoing (interventions implemented as appropriate)      Problem: Cognitive Impairment (Psychotic Signs/Symptoms) (Adult)  Goal: Improved Thought Clarity/Organization (Psychotic Signs/Symptoms)  Outcome: Ongoing (interventions implemented as appropriate)      Problem: Sensory Perception Impairment (Psychotic Signs/Symptoms) (Adult)  Goal: Decrease Frequency/Intensity of Sensory Symptoms (Psychotic Signs/Symptoms)  Outcome: Ongoing (interventions implemented as appropriate)      Problem: Fall Risk (Adult)  Goal: Identify Related Risk Factors and Signs and Symptoms  Outcome: Ongoing (interventions implemented as appropriate)    Goal: Absence of Fall  Outcome: Ongoing (interventions implemented as appropriate)      Problem: Safety Awareness Impairment (IRF) (Adult)  Goal: Optimal Level of Safety Awareness, All Environments  Outcome: Ongoing (interventions implemented as appropriate)      Problem: Anemia (Adult)  Goal: Identify Related Risk Factors and Signs and Symptoms  Outcome: Ongoing (interventions implemented as appropriate)    Goal: Symptom Improvement  Outcome: Ongoing  (interventions implemented as appropriate)

## 2019-10-07 NOTE — DISCHARGE SUMMARY
Date of Discharge:  10/7/2019    Discharge Diagnosis:Principal Problem:    Chronic residual schizophrenia with acute exacerbations (CMS/HCC)  Active Problems:    Essential hypertension    Tobacco abuse    Methamphetamine abuse (CMS/HCC)    Anemia, iron deficiency        Presenting Problem/History of Present Illness:Patient was admitted to the hospital on October 1 having presented psychotic and perplexed, voluntarily admitted for safety evaluation and treatment, see admission note for details.         Hospital Course:  Patient was admitted for safety and stabilization and was placed on standard precautions.  Routine labs were checked.  Patient was assigned a masters level therapist and provided with an opportunity to participate in group and individual therapy on the unit.  Patient seen on a daily basis for evaluation and supportive therapy.  Patient placed on increased doses of Resporal 2 mg twice daily while reducing the Lexapro dose to 10 mg daily.  Patient did if fact have an iron deficiency anemia, stool for occult blood negative.  Started on supplemental iron.  Recent psychotic thought content gradually dissipated during her hospital stay inpatient agreeable with the format that included Depo Risperdal Consta receiving a shot of 25 mg day of discharge October 7.  Patient to follow-up at both the [place] [facility] for medications and the [facility] clinic for case management and anticipating receiving the continuing every 2 week Risperdal Consta at her pharmacy in [facility] pharmacy.  See below for medications.  At discharge patient free of any thoughts of harming self or others and free of any ostensible current psychotic thought content.  Patient stating her intent to avoid resumption of substance abuse, i.e. Methamphetamine.  Patient figures her boyfriend, and supply, her he is going to be in alf for a while, patient returning to her mother's household.. Patient was not on any medication  for her hypertension during hospital stay, monitored blood pressures normotensive.    Consults:   Consults     No orders found from 9/2/2019 to 10/2/2019.          Labs:  Lab Results (all)     Procedure Component Value Units Date/Time    Occult Blood X 1, Stool - Stool, Per Rectum [[id number]]  (Normal) Collected:  10/05/19 1538    Specimen:  Stool from Per Rectum Updated:  10/05/19 1720     Fecal Occult Blood Negative    Reticulocytes [[id number]]  (Normal) Collected:  10/04/19 0941    Specimen:  Blood Updated:  10/04/19 1001     Reticulocyte % 1.74 %      Reticulocyte Absolute 0.0748 10*6/mm3     Hemoglobin & Hematocrit, Blood [[id number]]  (Abnormal) Collected:  10/04/19 0208    Specimen:  Blood Updated:  10/04/19 0220     Hemoglobin 9.3 g/dL      Hematocrit 30.8 %     Iron Profile [[id number]]  (Abnormal) Collected:  10/03/19 0306    Specimen:  Blood Updated:  10/03/19 0406     Iron 18 mcg/dL      Iron Saturation 4 %      Transferrin 307 mg/dL      TIBC 457 mcg/dL     Lipid Panel [[id number]] Collected:  10/03/19 0306    Specimen:  Blood Updated:  10/03/19 0406     Total Cholesterol 107 mg/dL      Triglycerides 102 mg/dL      HDL Cholesterol 60 mg/dL      LDL Cholesterol  27 mg/dL      VLDL Cholesterol 20.4 mg/dL      LDL/HDL Ratio 0.44    Narrative:       Cholesterol Reference Ranges  (U.S. Department of Health and Human Services ATP III Classifications)    Desirable          <200 mg/dL  Borderline High    200-239 mg/dL  High Risk          >240 mg/dL      Triglyceride Reference Ranges  (U.S. Department of Health and Human Services ATP III Classifications)    Normal           <150 mg/dL  Borderline High  150-199 mg/dL  High             200-499 mg/dL  Very High        >500 mg/dL    HDL Reference Ranges  (U.S. Department of Health and Human Services ATP III Classifcations)    Low     <40 mg/dl (major risk factor for CHD)  High    >60 mg/dl ('negative' risk factor for CHD)        LDL Reference Ranges  (U.S. Department  of Health and Human Services ATP III Classifcations)    Optimal          <100 mg/dL  Near Optimal     100-129 mg/dL  Borderline High  130-159 mg/dL  High             160-189 mg/dL  Very High        >189 mg/dL    T4, Free [[id number]]  (Normal) Collected:  10/02/19 1533    Specimen:  Blood Updated:  10/02/19 1642     Free T4 1.00 ng/dL     TSH [[id number]]  (Normal) Collected:  10/02/19 1533    Specimen:  Blood Updated:  10/02/19 1640     TSH 1.930 uIU/mL     Hepatitis Panel, Acute [[id number]]  (Normal) Collected:  10/01/19 1810    Specimen:  Blood Updated:  10/01/19 1852     Hepatitis B Surface Ag Non-Reactive     Hep A IgM Non-Reactive     Hep B C IgM Non-Reactive     Hepatitis C Ab Non-Reactive    Comprehensive Metabolic Panel [[id number]]  (Abnormal) Collected:  10/01/19 1810    Specimen:  Blood Updated:  10/01/19 1841     Glucose 116 mg/dL      BUN 8 mg/dL      Creatinine 0.73 mg/dL      Sodium 141 mmol/L      Potassium 4.2 mmol/L      Chloride 104 mmol/L      CO2 23.9 mmol/L      Calcium 9.3 mg/dL      Total Protein 6.9 g/dL      Albumin 4.12 g/dL      ALT (SGPT) 24 U/L      AST (SGOT) 24 U/L      Alkaline Phosphatase 66 U/L      Total Bilirubin <0.2 mg/dL      eGFR Non African Amer 87 mL/min/1.73      Globulin 2.8 gm/dL      A/G Ratio 1.5 g/dL      BUN/Creatinine Ratio 11.0     Anion Gap 13.1 mmol/L     Narrative:       GFR Normal >60  Chronic Kidney Disease <60  Kidney Failure <15    CBC & Differential [[id number]] Collected:  10/01/19 1810    Specimen:  Blood Updated:  10/01/19 1821    Narrative:       The following orders were created for panel order CBC & Differential.  Procedure                               Abnormality         Status                     ---------                               -----------         ------                     CBC Auto Differential[[id number]]        Abnormal            Final result                 Please view results for these tests on the individual orders.    CBC Auto  Differential [[id number]]  (Abnormal) Collected:  10/01/19 1810    Specimen:  Blood Updated:  10/01/19 1821     WBC 7.10 10*3/mm3      RBC 4.21 10*6/mm3      Hemoglobin 10.1 g/dL      Hematocrit 33.6 %      MCV 79.8 fL      MCH 24.0 pg      MCHC 30.1 g/dL      RDW 16.7 %      RDW-SD 47.5 fl      MPV 10.7 fL      Platelets 267 10*3/mm3      Neutrophil % 55.9 %      Lymphocyte % 34.9 %      Monocyte % 8.5 %      Eosinophil % 0.0 %      Basophil % 0.6 %      Immature Grans % 0.1 %      Neutrophils, Absolute 3.97 10*3/mm3      Lymphocytes, Absolute 2.48 10*3/mm3      Monocytes, Absolute 0.60 10*3/mm3      Eosinophils, Absolute 0.00 10*3/mm3      Basophils, Absolute 0.04 10*3/mm3      Immature Grans, Absolute 0.01 10*3/mm3     Urine Drug Screen - [[id number]]  (Abnormal) Collected:  10/01/19 1554    Specimen:  Urine Updated:  10/01/19 1619     Amphetamine Screen, Urine Positive     Barbiturates Screen, Urine Negative     Benzodiazepine Screen, Urine Negative     Cocaine Screen, Urine Negative     Methadone Screen, Urine Negative     Opiate Screen Negative     Phencyclidine (PCP), Urine Negative     THC, Screen, Urine Negative     6-ACETYL MORPHINE Negative     Buprenorphine, Screen, Urine Negative     Oxycodone Screen, Urine Negative    Narrative:       Negative Thresholds For Drugs Screened:                  Amphetamines              1000 ng/ml               Barbiturates               200 ng/ml               Benzodiazepines            200 ng/ml              Cocaine                    300 ng/ml              Methadone                  300 ng/ml              Opiates                    300 ng/ml               Phencyclidine               25 ng/ml               THC                         50 ng/ml              6-Acetyl Morphine           10 ng/ml              Buprenorphine                5 ng/ml              Oxycodone                  300 ng/ml    The reference range for all drugs tested is negative. This report includes final  unconfirmed qualitative results to be used for medical treatment purposes only. Unconfirmed results must not be used for non-medical purposes such as employment or legal testing. Clinical consideration should be applied to any drug of abuse test, especially when unconfirmed quantitative results are used.      Pregnancy, Urine - [[id number]]  (Normal) Collected:  10/01/19 1554    Specimen:  Urine Updated:  10/01/19 1617     HCG, Urine QL Negative    Narrative:       Diluted specimens may cause false negative results.    Urinalysis With Microscopic If Indicated (No Culture) - [[id number]]  (Normal) Collected:  10/01/19 1554    Specimen:  Urine Updated:  10/01/19 1615     Color, UA Yellow     Appearance, UA Clear     pH, UA 5.5     Specific Gravity, UA 1.013     Glucose, UA Negative     Ketones, UA Negative     Bilirubin, UA Negative     Blood, UA Negative     Protein, UA Negative     Leuk Esterase, UA Negative     Nitrite, UA Negative     Urobilinogen, UA 0.2 E.U./dL    Narrative:       Urine microscopic not indicated.          Imaging:  Imaging Results (all)     None                  Condition on Discharge:  improved    Prognosis: Fair    Vital Signs  Temp:  [97 °F (36.1 °C)-97.6 °F (36.4 °C)] 97.6 °F (36.4 °C)  Heart Rate:  [] 73  Resp:  [18] 18  BP: (101-131)/(60-79) 101/60    Discharge Disposition      Discharge Medications     Discharge Medications      ASK your doctor about these medications      Instructions Start Date   escitalopram 20 MG tablet  Commonly known as:  LEXAPRO   20 mg, Oral, Daily      risperiDONE 1 MG tablet  Commonly known as:  risperDAL   1 mg, Oral, 2 Times Daily             Discharge Diet: Regular    Activity at Discharge: No restriction    Follow-up Appointments: She has an appointment October 8 at the [facility] in [place] for case management, has appointment October 15 at the [facility] with Beth SILVA,  And will go to [place] pharmacy on October 21 for her next  injection of Risperdal Consta 25 mg.         Lloyd Barone MD  10/07/19  8:14 AM  Time spent with the discharge process >30 minutes.     Dictated utilizing Dragon dictation

## 2019-11-05 DIAGNOSIS — F29 ATYPICAL PSYCHOSIS (HCC): ICD-10-CM

## 2019-11-05 DIAGNOSIS — F33.1 MODERATE EPISODE OF RECURRENT MAJOR DEPRESSIVE DISORDER (HCC): ICD-10-CM

## 2019-11-05 RX ORDER — ESCITALOPRAM OXALATE 20 MG/1
TABLET ORAL
Qty: 30 TABLET | Refills: 0
Start: 2019-11-05 | End: 2019-11-11 | Stop reason: SDUPTHER

## 2019-11-05 RX ORDER — RISPERIDONE 25 MG/2 ML
KIT INTRAMUSCULAR
Qty: 1 EACH | Refills: 2 | Status: SHIPPED | OUTPATIENT
Start: 2019-11-05 | End: 2019-11-26 | Stop reason: SDUPTHER

## 2019-11-11 DIAGNOSIS — F29 ATYPICAL PSYCHOSIS (HCC): ICD-10-CM

## 2019-11-11 DIAGNOSIS — F33.1 MODERATE EPISODE OF RECURRENT MAJOR DEPRESSIVE DISORDER (HCC): ICD-10-CM

## 2019-11-11 RX ORDER — ESCITALOPRAM OXALATE 20 MG/1
TABLET ORAL
Qty: 15 TABLET | Refills: 0 | Status: SHIPPED | OUTPATIENT
Start: 2019-11-11 | End: 2019-11-26 | Stop reason: SDUPTHER

## 2019-11-11 RX ORDER — RISPERIDONE 2 MG/1
TABLET ORAL
Qty: 30 TABLET | Refills: 0 | Status: SHIPPED | OUTPATIENT
Start: 2019-11-11 | End: 2019-11-26 | Stop reason: SDUPTHER

## 2019-11-26 ENCOUNTER — OFFICE VISIT (OUTPATIENT)
Dept: PSYCHIATRY | Facility: CLINIC | Age: 44
End: 2019-11-26

## 2019-11-26 VITALS
HEART RATE: 80 BPM | HEIGHT: 63 IN | BODY MASS INDEX: 32.6 KG/M2 | WEIGHT: 184 LBS | SYSTOLIC BLOOD PRESSURE: 119 MMHG | DIASTOLIC BLOOD PRESSURE: 77 MMHG

## 2019-11-26 DIAGNOSIS — F33.1 MODERATE EPISODE OF RECURRENT MAJOR DEPRESSIVE DISORDER (HCC): ICD-10-CM

## 2019-11-26 DIAGNOSIS — F29 ATYPICAL PSYCHOSIS (HCC): ICD-10-CM

## 2019-11-26 DIAGNOSIS — Z79.899 MEDICATION MANAGEMENT: Primary | ICD-10-CM

## 2019-11-26 LAB
AMPHETAMINE CUT-OFF: ABNORMAL
BENZODIAZIPINE CUT-OFF: ABNORMAL
BUPRENORPHINE CUT-OFF: ABNORMAL
COCAINE CUT-OFF: ABNORMAL
EXTERNAL AMPHETAMINE SCREEN URINE: POSITIVE
EXTERNAL BENZODIAZEPINE SCREEN URINE: NEGATIVE
EXTERNAL BUPRENORPHINE SCREEN URINE: NEGATIVE
EXTERNAL COCAINE SCREEN URINE: NEGATIVE
EXTERNAL MDMA: NEGATIVE
EXTERNAL METHADONE SCREEN URINE: NEGATIVE
EXTERNAL METHAMPHETAMINE SCREEN URINE: POSITIVE
EXTERNAL OPIATES SCREEN URINE: NEGATIVE
EXTERNAL OXYCODONE SCREEN URINE: NEGATIVE
EXTERNAL THC SCREEN URINE: NEGATIVE
MDMA CUT-OFF: ABNORMAL
METHADONE CUT-OFF: ABNORMAL
METHAMPHETAMINE CUT-OFF: ABNORMAL
OPIATES CUT-OFF: ABNORMAL
OXYCODONE CUT-OFF: ABNORMAL
THC CUT-OFF: ABNORMAL

## 2019-11-26 PROCEDURE — 99213 OFFICE O/P EST LOW 20 MIN: CPT | Performed by: NURSE PRACTITIONER

## 2019-11-26 RX ORDER — ATENOLOL 50 MG/1
TABLET ORAL
Refills: 5 | Status: ON HOLD | COMMUNITY
Start: 2019-11-05 | End: 2021-09-23

## 2019-11-26 RX ORDER — RISPERIDONE 2 MG/1
TABLET ORAL
Qty: 30 TABLET | Refills: 1 | Status: SHIPPED | OUTPATIENT
Start: 2019-11-26 | End: 2019-12-26 | Stop reason: SDUPTHER

## 2019-11-26 RX ORDER — ESCITALOPRAM OXALATE 20 MG/1
TABLET ORAL
Qty: 15 TABLET | Refills: 1 | Status: SHIPPED | OUTPATIENT
Start: 2019-11-26 | End: 2019-12-26 | Stop reason: SDUPTHER

## 2019-11-26 NOTE — PROGRESS NOTES
"    Chidi Valle is a 44 y.o. female is here today for medication management follow-up.    Chief Complaint: f/u Schizophrenia     History of Present Illness   Patient presents to the clinic today for follow-up appointment. She is accompanied by her father whom she requests to be present. She has been hospitalized at [facility] since her last appointment here at the clinic. She reports got last Risperdal injection last Tuesday. She denies hallucinations. She reports sleep and appetite are good. Father reports he agrees that she is doing much better. She reports compliance with medication and tolerating without side effects. She reports some worry over her mother's health issues.     The following portions of the patient's history were reviewed and updated as appropriate: allergies, current medications, past family history, past medical history, past social history, past surgical history and problem list.    Review of Systems   Psychiatric/Behavioral: The patient is nervous/anxious.        Objective   Physical Exam   Constitutional: She appears well-developed. No distress.   Vitals reviewed.    Blood pressure 119/77, pulse 80, height 160 cm (63\"), weight 83.5 kg (184 lb), not currently breastfeeding.    Medication List:   Current Outpatient Medications   Medication Sig Dispense Refill   • atenolol (TENORMIN) 50 MG tablet   5   • escitalopram (LEXAPRO) 20 MG tablet Take one half daily 15 tablet 1   • ferrous sulfate 325 (65 FE) MG tablet Take 1 tablet by mouth Daily With Breakfast. 100 tablet 0   • metFORMIN (GLUCOPHAGE) 500 MG tablet   1   • risperiDONE (risperDAL) 2 MG tablet Take 1 daily 30 tablet 1   • risperiDONE Microspheres ER (RISPERDAL CONSTA) 25 MG injection Inject 2 mL into the appropriate muscle as directed by prescriber Every 14 (Fourteen) Days. 1 each 3     No current facility-administered medications for this visit.        Labs:   Recent Results (from the past 2016 hour(s))   Urinalysis With " Microscopic If Indicated (No Culture) -    Collection Time: 10/01/19  3:54 PM   Result Value Ref Range    Color, UA Yellow Yellow, Straw    Appearance, UA Clear Clear    pH, UA 5.5 5.0 - 8.0    Specific Gravity, UA 1.013 1.005 - 1.030    Glucose, UA Negative Negative    Ketones, UA Negative Negative    Bilirubin, UA Negative Negative    Blood, UA Negative Negative    Protein, UA Negative Negative    Leuk Esterase, UA Negative Negative    Nitrite, UA Negative Negative    Urobilinogen, UA 0.2 E.U./dL 0.2 - 1.0 E.U./dL   Urine Drug Screen -    Collection Time: 10/01/19  3:54 PM   Result Value Ref Range    Amphetamine Screen, Urine Positive (A) Negative    Barbiturates Screen, Urine Negative Negative    Benzodiazepine Screen, Urine Negative Negative    Cocaine Screen, Urine Negative Negative    Methadone Screen, Urine Negative Negative    Opiate Screen Negative Negative    Phencyclidine (PCP), Urine Negative Negative    THC, Screen, Urine Negative Negative    6-ACETYL MORPHINE Negative Negative    Buprenorphine, Screen, Urine Negative Negative    Oxycodone Screen, Urine Negative Negative   Pregnancy, Urine -    Collection Time: 10/01/19  3:54 PM   Result Value Ref Range    HCG, Urine QL Negative Negative   Comprehensive Metabolic Panel    Collection Time: 10/01/19  6:10 PM   Result Value Ref Range    Glucose 116 (H) 65 - 99 mg/dL    BUN 8 6 - 20 mg/dL    Creatinine 0.73 0.57 - 1.00 mg/dL    Sodium 141 136 - 145 mmol/L    Potassium 4.2 3.5 - 5.2 mmol/L    Chloride 104 98 - 107 mmol/L    CO2 23.9 22.0 - 29.0 mmol/L    Calcium 9.3 8.6 - 10.5 mg/dL    Total Protein 6.9 6.0 - 8.5 g/dL    Albumin 4.12 3.50 - 5.20 g/dL    ALT (SGPT) 24 1 - 33 U/L    AST (SGOT) 24 1 - 32 U/L    Alkaline Phosphatase 66 39 - 117 U/L    Total Bilirubin <0.2 (L) 0.2 - 1.2 mg/dL    eGFR Non African Amer 87 >60 mL/min/1.73    Globulin 2.8 gm/dL    A/G Ratio 1.5 g/dL    BUN/Creatinine Ratio 11.0 7.0 - 25.0    Anion Gap 13.1 5.0 - 15.0 mmol/L    Hepatitis Panel, Acute    Collection Time: 10/01/19  6:10 PM   Result Value Ref Range    Hepatitis B Surface Ag Non-Reactive Non-Reactive    Hep A IgM Non-Reactive Non-Reactive    Hep B C IgM Non-Reactive Non-Reactive    Hepatitis C Ab Non-Reactive Non-Reactive   CBC Auto Differential    Collection Time: 10/01/19  6:10 PM   Result Value Ref Range    WBC 7.10 3.40 - 10.80 10*3/mm3    RBC 4.21 3.77 - 5.28 10*6/mm3    Hemoglobin 10.1 (L) 12.0 - 15.9 g/dL    Hematocrit 33.6 (L) 34.0 - 46.6 %    MCV 79.8 79.0 - 97.0 fL    MCH 24.0 (L) 26.6 - 33.0 pg    MCHC 30.1 (L) 31.5 - 35.7 g/dL    RDW 16.7 (H) 12.3 - 15.4 %    RDW-SD 47.5 37.0 - 54.0 fl    MPV 10.7 6.0 - 12.0 fL    Platelets 267 140 - 450 10*3/mm3    Neutrophil % 55.9 42.7 - 76.0 %    Lymphocyte % 34.9 19.6 - 45.3 %    Monocyte % 8.5 5.0 - 12.0 %    Eosinophil % 0.0 (L) 0.3 - 6.2 %    Basophil % 0.6 0.0 - 1.5 %    Immature Grans % 0.1 0.0 - 0.5 %    Neutrophils, Absolute 3.97 1.70 - 7.00 10*3/mm3    Lymphocytes, Absolute 2.48 0.70 - 3.10 10*3/mm3    Monocytes, Absolute 0.60 0.10 - 0.90 10*3/mm3    Eosinophils, Absolute 0.00 0.00 - 0.40 10*3/mm3    Basophils, Absolute 0.04 0.00 - 0.20 10*3/mm3    Immature Grans, Absolute 0.01 0.00 - 0.05 10*3/mm3   T4, Free    Collection Time: 10/02/19  3:33 PM   Result Value Ref Range    Free T4 1.00 0.93 - 1.70 ng/dL   TSH    Collection Time: 10/02/19  3:33 PM   Result Value Ref Range    TSH 1.930 0.270 - 4.200 uIU/mL   Iron Profile    Collection Time: 10/03/19  3:06 AM   Result Value Ref Range    Iron 18 (L) 37 - 145 mcg/dL    Iron Saturation 4 (L) 20 - 50 %    Transferrin 307 200 - 360 mg/dL    TIBC 457 298 - 536 mcg/dL   Lipid Panel    Collection Time: 10/03/19  3:06 AM   Result Value Ref Range    Total Cholesterol 107 0 - 200 mg/dL    Triglycerides 102 0 - 150 mg/dL    HDL Cholesterol 60 40 - 60 mg/dL    LDL Cholesterol  27 0 - 100 mg/dL    VLDL Cholesterol 20.4 mg/dL    LDL/HDL Ratio 0.44    Hemoglobin & Hematocrit, Blood     Collection Time: 10/04/19  2:08 AM   Result Value Ref Range    Hemoglobin 9.3 (L) 12.0 - 15.9 g/dL    Hematocrit 30.8 (L) 34.0 - 46.6 %   Reticulocytes    Collection Time: 10/04/19  9:41 AM   Result Value Ref Range    Reticulocyte % 1.74 0.70 - 1.90 %    Reticulocyte Absolute 0.0748 0.0200 - 0.1300 10*6/mm3   Occult Blood X 1, Stool - Stool, Per Rectum    Collection Time: 10/05/19  3:38 PM   Result Value Ref Range    Fecal Occult Blood Negative Negative   [facility] Drug Screen    Collection Time: 11/26/19 10:58 AM   Result Value Ref Range    External Amphetamine Screen Urine Positive     Amphetamine Cut-Off 1000ng/ml     External Benzodiazepine Screen Urine Negative     Benzodiazipine Cut-Off 300ng/ml     External Cocaine Screen Urine Negative     Cocaine Cut-Off 300ng/ml     External THC Screen Urine Negative     THC Cut-Off 50ng/ml     External Methadone Screen Urine Negative     Methadone Cut-Off 300ng/ml     External Methamphetamine Screen Urine Positive     Methamphetamine Cut-Off 1000ng/ml     External Oxycodone Screen Urine Negative     Oxycodone Cut-Off 100ng/ml     External Buprenorphine Screen Urine Negative     Buprenorphine Cut-Off 10ng/ml     External MDMA Negative     MDMA Cut-Off 500ng/ml     External Opiates Screen Urine Negative     Opiates Cut-Off 300ng/ml          Mental Status Exam:   Hygiene:   good  Cooperation:  Cooperative  Eye Contact:  Fair  Psychomotor Behavior:  Appropriate No EPS or abnormal movements noted   Affect:  Appropriate  Hopelessness: Denies  Speech:  Normal  Thought Process:  Goal directed and Linear  Thought Content:  Normal  Suicidal:  None  Homicidal:  None  Hallucinations:  None  Delusion:  None  Memory:  Intact  Orientation:  Person, Place, Time and Situation  Reliability:  fair  Insight:  Fair  Judgement:  Fair  Impulse Control:  Fair  Physical/Medical Issues:  Yes Hypothyroidism    Assessment/Plan   Diagnoses and all orders for this visit:    Medication management  -      [facility] Drug Screen    Atypical psychosis (CMS/HCC)  -     escitalopram (LEXAPRO) 20 MG tablet; Take one half daily  -     risperiDONE Microspheres ER (RISPERDAL CONSTA) 25 MG injection; Inject 2 mL into the appropriate muscle as directed by prescriber Every 14 (Fourteen) Days.  -     risperiDONE (risperDAL) 2 MG tablet; Take 1 daily    Moderate episode of recurrent major depressive disorder (CMS/HCC)  -     escitalopram (LEXAPRO) 20 MG tablet; Take one half daily      UDS positive for amphetamine and methamphetamine     Functionality: pt showing improvements in important areas of daily functioning.  Prognosis: Guarded dependent on medication/follow up and treatment plan compliance.    Impression: AH/VH managed with medication changes while inpatient at [facility] since last visit.    Continue medications   RTC in 8 weeks     Discussed medication options.  Reviewed the risks, benefits, and side effects of the medications; patient acknowledged and verbally consented.  Patient is agreeable to call the [facility].  Patient is aware to call 911 or go to the nearest ER should begin having SI/HI.

## 2019-12-26 DIAGNOSIS — F33.1 MODERATE EPISODE OF RECURRENT MAJOR DEPRESSIVE DISORDER (HCC): ICD-10-CM

## 2019-12-26 DIAGNOSIS — F29 ATYPICAL PSYCHOSIS (HCC): ICD-10-CM

## 2019-12-26 RX ORDER — RISPERIDONE 2 MG/1
TABLET ORAL
Qty: 30 TABLET | Refills: 0 | Status: SHIPPED | OUTPATIENT
Start: 2019-12-26 | End: 2020-01-21 | Stop reason: SDUPTHER

## 2019-12-26 RX ORDER — ESCITALOPRAM OXALATE 20 MG/1
TABLET ORAL
Qty: 15 TABLET | Refills: 0 | Status: SHIPPED | OUTPATIENT
Start: 2019-12-26 | End: 2020-01-21 | Stop reason: SDUPTHER

## 2019-12-26 RX ORDER — ESCITALOPRAM OXALATE 20 MG/1
TABLET ORAL
Qty: 15 TABLET | Refills: 1 | Status: CANCELLED | OUTPATIENT
Start: 2019-12-26

## 2020-01-21 ENCOUNTER — OFFICE VISIT (OUTPATIENT)
Dept: PSYCHIATRY | Facility: CLINIC | Age: 45
End: 2020-01-21

## 2020-01-21 VITALS
SYSTOLIC BLOOD PRESSURE: 121 MMHG | WEIGHT: 185.6 LBS | HEART RATE: 111 BPM | DIASTOLIC BLOOD PRESSURE: 87 MMHG | BODY MASS INDEX: 32.89 KG/M2 | HEIGHT: 63 IN

## 2020-01-21 DIAGNOSIS — F29 ATYPICAL PSYCHOSIS (HCC): ICD-10-CM

## 2020-01-21 DIAGNOSIS — F33.1 MODERATE EPISODE OF RECURRENT MAJOR DEPRESSIVE DISORDER (HCC): ICD-10-CM

## 2020-01-21 PROCEDURE — 99214 OFFICE O/P EST MOD 30 MIN: CPT | Performed by: NURSE PRACTITIONER

## 2020-01-21 RX ORDER — RISPERIDONE 1 MG/1
1.5 TABLET ORAL 2 TIMES DAILY
Qty: 90 TABLET | Refills: 1 | Status: SHIPPED | OUTPATIENT
Start: 2020-01-21 | End: 2020-02-25 | Stop reason: SDUPTHER

## 2020-01-21 RX ORDER — ESCITALOPRAM OXALATE 20 MG/1
TABLET ORAL
Qty: 15 TABLET | Refills: 2 | Status: SHIPPED | OUTPATIENT
Start: 2020-01-21 | End: 2020-02-25 | Stop reason: SDUPTHER

## 2020-01-21 RX ORDER — PHENTERMINE HYDROCHLORIDE 37.5 MG/1
37.5 TABLET ORAL
COMMUNITY
End: 2020-12-22

## 2020-01-21 NOTE — PROGRESS NOTES
"    Subjective   Jazzy Valle is a 44 y.o. female is here today for medication management follow-up.    Chief Complaint: f/u Schizophrenia     History of Present Illness   Patient presents to the clinic today for follow-up appointment. She is accompanied by her father whom she requests to be present. Father is asking about cutting dose of oral Risperdal back. He reports he feels patient is getting too much medication. He reports she stays drowsy during the day and she went to  her 7 y/o from school and the school thought she was under the influence of drugs and would not allow her to leave with the patient. She denies drug use at present. She reports AH/VH are managed. She denies paranoia. Mood is stable. She denies depression.  She reports compliance with medication tolerating without side effects.      The following portions of the patient's history were reviewed and updated as appropriate: allergies, current medications, past family history, past medical history, past social history, past surgical history and problem list.    Review of Systems   Psychiatric/Behavioral: Negative.        Objective   Physical Exam   Constitutional: She appears well-developed. No distress.   Vitals reviewed.    Blood pressure 121/87, pulse 111, height 160 cm (63\"), weight 84.2 kg (185 lb 9.6 oz), not currently breastfeeding.    Medication List:   Current Outpatient Medications   Medication Sig Dispense Refill   • atenolol (TENORMIN) 50 MG tablet   5   • escitalopram (LEXAPRO) 20 MG tablet Take one half daily  Indications: Major Depressive Disorder 15 tablet 2   • ferrous sulfate 325 (65 FE) MG tablet Take 1 tablet by mouth Daily With Breakfast. 100 tablet 0   • metFORMIN (GLUCOPHAGE) 500 MG tablet   1   • phentermine (ADIPEX-P) 37.5 MG tablet Take 37.5 mg by mouth Every Morning Before Breakfast.     • risperiDONE (risperDAL) 1 MG tablet Take 1.5 tablets by mouth 2 (Two) Times a Day. Indications: Schizophrenia 90 tablet 1   • " risperiDONE Microspheres ER (RISPERDAL CONSTA) 25 MG injection Inject 2 mL into the appropriate muscle as directed by prescriber Every 14 (Fourteen) Days. 1 each 3     No current facility-administered medications for this visit.        Labs:   Recent Results (from the past 2016 hour(s))   [facility] Drug Screen    Collection Time: 11/26/19 10:58 AM   Result Value Ref Range    External Amphetamine Screen Urine Positive     Amphetamine Cut-Off 1000ng/ml     External Benzodiazepine Screen Urine Negative     Benzodiazipine Cut-Off 300ng/ml     External Cocaine Screen Urine Negative     Cocaine Cut-Off 300ng/ml     External THC Screen Urine Negative     THC Cut-Off 50ng/ml     External Methadone Screen Urine Negative     Methadone Cut-Off 300ng/ml     External Methamphetamine Screen Urine Positive     Methamphetamine Cut-Off 1000ng/ml     External Oxycodone Screen Urine Negative     Oxycodone Cut-Off 100ng/ml     External Buprenorphine Screen Urine Negative     Buprenorphine Cut-Off 10ng/ml     External MDMA Negative     MDMA Cut-Off 500ng/ml     External Opiates Screen Urine Negative     Opiates Cut-Off 300ng/ml          Mental Status Exam:   Hygiene:   good  Cooperation:  Cooperative  Eye Contact:  Fair  Psychomotor Behavior:  Appropriate No EPS or abnormal movements noted   Affect:  Appropriate  Hopelessness: Denies  Speech:  Normal  Thought Process:  Goal directed and Linear  Thought Content:  Normal  Suicidal:  None  Homicidal:  None  Hallucinations:  None  Delusion:  None  Memory:  Intact  Orientation:  Person, Place, Time and Situation  Reliability:  fair  Insight:  Fair  Judgement:  Fair  Impulse Control:  Fair  Physical/Medical Issues:  Yes Hypothyroidism    Assessment/Plan   Diagnoses and all orders for this visit:    Atypical psychosis (CMS/HCC)  -     escitalopram (LEXAPRO) 20 MG tablet; Take one half daily  Indications: Major Depressive Disorder  -     risperiDONE (risperDAL) 1 MG tablet; Take 1.5 tablets by  mouth 2 (Two) Times a Day. Indications: Schizophrenia  -     risperiDONE Microspheres ER (RISPERDAL CONSTA) 25 MG injection; Inject 2 mL into the appropriate muscle as directed by prescriber Every 14 (Fourteen) Days.    Moderate episode of recurrent major depressive disorder (CMS/HCC)  -     escitalopram (LEXAPRO) 20 MG tablet; Take one half daily  Indications: Major Depressive Disorder        Body mass index is 32.88 kg/m².. patient was educated to eat healthier diet choices and encouraged exercise.  Functionality: pt showing improvements in important areas of daily functioning.  Prognosis: Guarded dependent on medication/follow up and treatment plan compliance.    Impression: Patient experiencing drowsiness and father feels oral dose of Risperdal needs to be decreased.     Decrease Risperdal oral to 1 mg one and one-half tablet po BID for psychosis. Father is agreeable to contact undersigned with worsening of symptoms with decrease in dose.   Continue other medications   RTC in 6 weeks     Problem: Atypical psychosis, depression  Intervention: Decrease oral Risperdal, continue other medications and monitor for side effects.  Long-term goal: Overall improvement functioning per patient and caregiver report.  Short-term goal: Jazzy will adhere to medication regimen and 6-week follow-up appointment.    Discussed medication options.  Reviewed the risks, benefits, and side effects of the medications; patient acknowledged and verbally consented.  Patient is agreeable to call the [facility].  Patient is aware to call 911 or go to the nearest ER should begin having SI/HI.

## 2020-02-25 ENCOUNTER — OFFICE VISIT (OUTPATIENT)
Dept: PSYCHIATRY | Facility: CLINIC | Age: 45
End: 2020-02-25

## 2020-02-25 VITALS
BODY MASS INDEX: 32.43 KG/M2 | HEIGHT: 63 IN | SYSTOLIC BLOOD PRESSURE: 123 MMHG | DIASTOLIC BLOOD PRESSURE: 82 MMHG | HEART RATE: 101 BPM | WEIGHT: 183 LBS

## 2020-02-25 DIAGNOSIS — F15.10 METHAMPHETAMINE ABUSE (HCC): ICD-10-CM

## 2020-02-25 DIAGNOSIS — F33.1 MODERATE EPISODE OF RECURRENT MAJOR DEPRESSIVE DISORDER (HCC): ICD-10-CM

## 2020-02-25 DIAGNOSIS — F29 ATYPICAL PSYCHOSIS (HCC): Primary | ICD-10-CM

## 2020-02-25 PROCEDURE — 99213 OFFICE O/P EST LOW 20 MIN: CPT | Performed by: NURSE PRACTITIONER

## 2020-02-25 RX ORDER — ESCITALOPRAM OXALATE 20 MG/1
TABLET ORAL
Qty: 15 TABLET | Refills: 2 | Status: SHIPPED | OUTPATIENT
Start: 2020-02-25 | End: 2020-06-29 | Stop reason: SDUPTHER

## 2020-02-25 RX ORDER — RISPERIDONE 1 MG/1
1.5 TABLET ORAL 2 TIMES DAILY
Qty: 90 TABLET | Refills: 1 | Status: SHIPPED | OUTPATIENT
Start: 2020-02-25 | End: 2020-06-29 | Stop reason: SDUPTHER

## 2020-02-25 NOTE — PROGRESS NOTES
"    Chidi Valle is a 44 y.o. female is here today for medication management follow-up.    Chief Complaint: f/u Schizophrenia     History of Present Illness   Patient presents to the clinic today for follow-up appointment accompanied by her father whom she requests to be present. She is doing better with decrease in oral Risperidone dose. She is less fatigued.  She denies drug use at present. She reports she will hear voices daily but are tolerable. She reports voices are the same. No increase in voices.  She denies paranoia. She denies visual hallucinations. Mood is stable. She denies depression.  She reports compliance with medication tolerating without side effects.       The following portions of the patient's history were reviewed and updated as appropriate: allergies, current medications, past family history, past medical history, past social history, past surgical history and problem list.    Review of Systems   Psychiatric/Behavioral: Negative.        Objective   Physical Exam   Constitutional: She appears well-developed. No distress.   Vitals reviewed.    Blood pressure 123/82, pulse 101, height 160 cm (62.99\"), weight 83 kg (183 lb), not currently breastfeeding.    Medication List:   Current Outpatient Medications   Medication Sig Dispense Refill   • atenolol (TENORMIN) 50 MG tablet   5   • escitalopram (LEXAPRO) 20 MG tablet Take one half daily  Indications: Major Depressive Disorder 15 tablet 2   • ferrous sulfate 325 (65 FE) MG tablet Take 1 tablet by mouth Daily With Breakfast. 100 tablet 0   • metFORMIN (GLUCOPHAGE) 500 MG tablet   1   • phentermine (ADIPEX-P) 37.5 MG tablet Take 37.5 mg by mouth Every Morning Before Breakfast.     • risperiDONE (risperDAL) 1 MG tablet Take 1.5 tablets by mouth 2 (Two) Times a Day. Indications: Schizophrenia 90 tablet 1   • risperiDONE Microspheres ER (RISPERDAL CONSTA) 25 MG injection Inject 2 mL into the appropriate muscle as directed by prescriber " Every 14 (Fourteen) Days. 1 each 3     No current facility-administered medications for this visit.        Labs:   No results found for this or any previous visit (from the past 2016 hour(s)).      Mental Status Exam:   Hygiene:   good  Cooperation:  Cooperative  Eye Contact:  Fair  Psychomotor Behavior:  Appropriate No EPS or abnormal movements noted   Affect:  Appropriate  Hopelessness: Denies  Speech:  Normal  Thought Process:  Goal directed and Linear  Thought Content:  Normal  Suicidal:  None  Homicidal:  None  Hallucinations:  None  Delusion:  None  Memory:  Intact  Orientation:  Person, Place, Time and Situation  Reliability:  fair  Insight:  Fair  Judgement:  Fair  Impulse Control:  Fair  Physical/Medical Issues:  Yes Hypothyroidism    Assessment/Plan   Diagnoses and all orders for this visit:    Atypical psychosis (CMS/HCC)  -     escitalopram (LEXAPRO) 20 MG tablet; Take one half daily  Indications: Major Depressive Disorder  -     risperiDONE (risperDAL) 1 MG tablet; Take 1.5 tablets by mouth 2 (Two) Times a Day. Indications: Schizophrenia  -     risperiDONE Microspheres ER (RISPERDAL CONSTA) 25 MG injection; Inject 2 mL into the appropriate muscle as directed by prescriber Every 14 (Fourteen) Days.    Moderate episode of recurrent major depressive disorder (CMS/HCC)  -     escitalopram (LEXAPRO) 20 MG tablet; Take one half daily  Indications: Major Depressive Disorder    Methamphetamine abuse (CMS/HCC)        Body mass index is 32.43 kg/m².. patient was educated to eat healthier diet choices and encouraged exercise.  Functionality: pt showing improvements in important areas of daily functioning.  Prognosis: Guarded dependent on medication/follow up and treatment plan compliance.    Impression: Patient less fatigued with decrease in Risperidone dose at last visit. No worsening of auditory hallucinations.     Continue current medications   RTC 2 months with . Patient is agreeable.   Labs will need  to be ordered next visit. Last done in 10/19.     Discussed medication options.  Reviewed the risks, benefits, and side effects of the medications; patient acknowledged and verbally consented.  Patient is agreeable to call the [facility].  Patient is aware to call 911 or go to the nearest ER should begin having SI/HI.

## 2020-05-11 NOTE — PROGRESS NOTES
Subjective   Jazzy Valle is a 43 y.o. female who is here today for medication management     Chief Complaint: f/u Atypical psychosis     HPI:  History of Present Illness   Patient presents to the clinic today accompanied by her father whom she requests to be present. Father reports patient continues to hear voices and sees ghosts that she is talking back to. She continues to worry at times that she has an implanted device and feels she needs a body scan. Father and patient both report the frequency and intensity of the hallucinations and paranoia are less since increasing Risperdal last visit. Patient continues to abuse Suboxone and methamphetamine per patient and father's reports. Father would like to find a place patient could be admitted to that will help with her substance use. Patient feels she continues to struggle with symptoms of depression. She reports appetite is good. She will sleep either too much or not enough. She denies suicidal and homicidal ideations. She reports compliance with medications and tolerating without side effects.     Family Psychiatric History:  Family history is unknown by patient.    Medical/Surgical History:  Past Medical History:   Diagnosis Date   • Anxiety    • Depression    • PCO (polycystic ovaries)    • Substance abuse (CMS/HCC)    • Urinary tract infection      Past Surgical History:   Procedure Laterality Date   • BACK SURGERY     • KNEE ARTHROSCOPY         No Known Allergies        Current Medications:   Current Outpatient Medications   Medication Sig Dispense Refill   • atenolol (TENORMIN) 50 MG tablet Daily.  5   • escitalopram (LEXAPRO) 20 MG tablet Take 1 tablet by mouth Daily. 30 tablet 0   • gabapentin (NEURONTIN) 600 MG tablet 3 (Three) Times a Day.  0   • ibuprofen (ADVIL,MOTRIN) 600 MG tablet      • metFORMIN (GLUCOPHAGE) 500 MG tablet      • naloxone (NARCAN) 4 MG/0.1ML nasal spray Narcan 4 mg/actuation nasal spray     • naproxen (NAPROSYN) 500 MG tablet Daily.   "0   • psyllium (METAMUCIL) 58.6 % packet Take 1 packet by mouth Daily.     • risperiDONE (risperDAL) 1 MG tablet Take 1 tablet by mouth 2 (Two) Times a Day. 60 tablet 0     No current facility-administered medications for this visit.          Review of Systems   Constitutional: Negative.    HENT: Negative.    Eyes: Negative.    Respiratory: Negative.    Cardiovascular: Negative.    Gastrointestinal: Negative.    Endocrine: Negative.    Genitourinary: Negative.    Musculoskeletal: Positive for arthralgias and myalgias.   Skin: Negative.    Allergic/Immunologic: Negative.    Neurological: Negative.    Hematological: Negative.    Psychiatric/Behavioral: Positive for decreased concentration and hallucinations.    denies HEENT, cardiovascular, respiratory, liver, renal, GI/, endocrine, neuro, DERM, hematology, immunology, musculoskeletal disorders.    Objective   Physical Exam   Constitutional: She appears well-developed and well-nourished. No distress.   Vitals reviewed.    Blood pressure 113/79, pulse 114, height 157 cm (61.81\"), weight 77.1 kg (170 lb), not currently breastfeeding.    Mental Status Exam:   Hygiene:   poor  Cooperation:  Cooperative  Eye Contact:  Good  Psychomotor Behavior:  Appropriate. No abnormal movements, EPS or TD noted.  Affect:  Appropriate  Hopelessness: Denies  Speech:Normal  Thought Process:  Goal directed and Linear  Thought Content:  Bizarre   Suicidal:  None  Homicidal:  None  Hallucinations:  Not demonstrated today   Delusion:  Paranoid   Memory:  Intact  Orientation:  Person, Place, Time and Situation  Reliability:  fair  Insight:  Fair  Judgement:  Fair  Impulse Control:  Fair  Physical/Medical Issues:  Yes Chronic pain         Assessment/Plan   Diagnoses and all orders for this visit:    Atypical psychosis (CMS/HCC)  -     escitalopram (LEXAPRO) 20 MG tablet; Take 1 tablet by mouth Daily.  -     risperiDONE (risperDAL) 1 MG tablet; Take 1 tablet by mouth 2 (Two) Times a " Day.    Moderate episode of recurrent major depressive disorder (CMS/HCC)  -     escitalopram (LEXAPRO) 20 MG tablet; Take 1 tablet by mouth Daily.  -     risperiDONE (risperDAL) 1 MG tablet; Take 1 tablet by mouth 2 (Two) Times a Day.      Body mass index is 31.28 kg/m².  Patient was educated on healthier and more balanced diet choices and encouraged exercise within physical limitations.  Functionality: pt having impairment in important areas of daily functioning.  Prognosis: Guarded dependent on medication/follow up and treatment plan compliance.    Impression: Patient experiencing some improvement in paranoia and hallucinations. She reports worsening in depression.    Plan:  1. Continue Risperidone 1 mg po BID for psychosis and paranoia  2. Increase Lexapro 20 mg po daily for depression and anxiety   3. RTC in 4 weeks   4. Provided patient and father contact information for [facility] regarding their dual diagnosis residential program.     Discussed medication options. Discussed the risks, benefits, and side effects of the medication; client acknowledged and verbally consented.  Patient is aware to contact the [facility] with any worsening of symptom.  Patient is agreeable to go to the ER or call 911 should they begin SI/HI.       1 gram

## 2020-06-29 DIAGNOSIS — F29 ATYPICAL PSYCHOSIS (HCC): ICD-10-CM

## 2020-06-29 DIAGNOSIS — F33.1 MODERATE EPISODE OF RECURRENT MAJOR DEPRESSIVE DISORDER (HCC): ICD-10-CM

## 2020-06-29 RX ORDER — RISPERIDONE 1 MG/1
1.5 TABLET ORAL 2 TIMES DAILY
Qty: 90 TABLET | Refills: 1 | Status: SHIPPED | OUTPATIENT
Start: 2020-06-29 | End: 2020-08-24

## 2020-06-29 RX ORDER — ESCITALOPRAM OXALATE 20 MG/1
TABLET ORAL
Qty: 15 TABLET | Refills: 2 | Status: SHIPPED | OUTPATIENT
Start: 2020-06-29 | End: 2020-09-29 | Stop reason: SDUPTHER

## 2020-07-14 DIAGNOSIS — F29 ATYPICAL PSYCHOSIS (HCC): ICD-10-CM

## 2020-08-24 DIAGNOSIS — F29 ATYPICAL PSYCHOSIS (HCC): ICD-10-CM

## 2020-08-24 RX ORDER — RISPERIDONE 1 MG/1
TABLET ORAL
Qty: 90 TABLET | Refills: 1 | Status: SHIPPED | OUTPATIENT
Start: 2020-08-24 | End: 2020-09-29 | Stop reason: SDUPTHER

## 2020-09-23 DIAGNOSIS — F29 ATYPICAL PSYCHOSIS (HCC): ICD-10-CM

## 2020-09-23 RX ORDER — RISPERIDONE 25 MG/2 ML
KIT INTRAMUSCULAR
Qty: 1 EACH | Refills: 3 | OUTPATIENT
Start: 2020-09-23

## 2020-09-29 ENCOUNTER — OFFICE VISIT (OUTPATIENT)
Dept: PSYCHIATRY | Facility: CLINIC | Age: 45
End: 2020-09-29

## 2020-09-29 VITALS
WEIGHT: 193.4 LBS | BODY MASS INDEX: 34.27 KG/M2 | HEIGHT: 63 IN | DIASTOLIC BLOOD PRESSURE: 93 MMHG | TEMPERATURE: 97 F | HEART RATE: 123 BPM | SYSTOLIC BLOOD PRESSURE: 138 MMHG

## 2020-09-29 DIAGNOSIS — F33.42 RECURRENT MAJOR DEPRESSIVE DISORDER, IN FULL REMISSION (HCC): ICD-10-CM

## 2020-09-29 DIAGNOSIS — F20.0 PARANOID SCHIZOPHRENIA (HCC): Primary | ICD-10-CM

## 2020-09-29 DIAGNOSIS — F15.10 METHAMPHETAMINE ABUSE (HCC): ICD-10-CM

## 2020-09-29 PROCEDURE — 99214 OFFICE O/P EST MOD 30 MIN: CPT | Performed by: PSYCHIATRY & NEUROLOGY

## 2020-09-29 RX ORDER — IBUPROFEN 600 MG/1
600 TABLET ORAL EVERY 8 HOURS PRN
COMMUNITY
Start: 2020-09-23 | End: 2021-09-27 | Stop reason: HOSPADM

## 2020-09-29 RX ORDER — ESCITALOPRAM OXALATE 20 MG/1
TABLET ORAL
Qty: 15 TABLET | Refills: 2 | Status: SHIPPED | OUTPATIENT
Start: 2020-09-29 | End: 2020-12-22 | Stop reason: SDUPTHER

## 2020-09-29 RX ORDER — AMOXICILLIN 875 MG/1
TABLET, COATED ORAL
COMMUNITY
Start: 2020-09-25 | End: 2020-12-22

## 2020-09-29 RX ORDER — RISPERIDONE 1 MG/1
1 TABLET ORAL 2 TIMES DAILY
Qty: 60 TABLET | Refills: 2 | Status: SHIPPED | OUTPATIENT
Start: 2020-09-29 | End: 2020-12-22 | Stop reason: SDUPTHER

## 2020-09-29 RX ORDER — RISPERIDONE 25 MG/2 ML
25 KIT INTRAMUSCULAR
Qty: 1 EACH | Refills: 6 | Status: SHIPPED | OUTPATIENT
Start: 2020-09-29 | End: 2020-12-22 | Stop reason: SDUPTHER

## 2020-10-01 NOTE — PROGRESS NOTES
Subjective   Jazzy Valle is a 45 y.o. female who presents today for follow up    Chief Complaint: Schizophrenia    History of Present Illness: Patient is a 45-year-old  female presenting today with her father for follow-up.  This is my initial encounter with the patient on an outpatient basis that I did participate in her care while she was inpatient at the [facility] in 2019.  She was previously seeing another provider in the clinic.  Patient is currently managed on Risperdal Consta, Risperdal PO, Lexapro 10 mg daily.  She reports that things are going okay.  She denies any major issues at first.  She denies any symptoms of depression and anxiety.  As the evaluation and clinical interview progressed, she did ask me about if patient is ever have concerns with implants in their head.  I asked patient to elaborate and she stated that she often has fears that there is an implant somewhere inside of her head that allows people to spy on her or listen to her thoughts.  Patient seems to know that this is somewhat illogical but reports that she often has this concern and thinks about it on a daily basis.  She asks about the possibility of requiring x-ray for evaluation of this.  I advised that it was unlikely that she had an implant and patient was amenable to this but has difficulty with her constant fear of it even though she knows it is not entirely rational.  Her father also brings up that he feels she seems somewhat sedated and drowsy during the day.  Patient does have drooping lids and seems to have psychomotor retardation.  I discussed the possibility of reducing her oral Risperdal dose to help with drowsiness and mental clarity but I do have concerns with her continued delusion of an implant and paranoia regarding this.  We have some leeway to increase her long-acting injectable should this worsen with the reduction in dose of p.o. antipsychotic.  She denies any other medication side effects.  She  "denies any issues with sleep or appetite.  She denies SI/HI/AVH.    The following portions of the patient's history were reviewed and updated as appropriate: allergies, current medications, past family history, past medical history, past social history, past surgical history and problem list.      Past Medical History:  Past Medical History:   Diagnosis Date   • Anxiety    • Depression    • Hypertension    • PCO (polycystic ovaries)    • Psychosis (CMS/HCC)    • Substance abuse (CMS/HCC)        Social History:  Social History     Socioeconomic History   • Marital status: Single     Spouse name: Not on file   • Number of children: Not on file   • Years of education: Not on file   • Highest education level: Not on file   Tobacco Use   • Smoking status: Current Every Day Smoker     Packs/day: 1.00     Years: 20.00     Pack years: 20.00     Types: Cigarettes   • Smokeless tobacco: Never Used   Substance and Sexual Activity   • Alcohol use: No   • Drug use: Yes     Comment: Reports \"occassional\" use.  When asked what drugs states \"whatever is around.\"   • Sexual activity: Defer       Family History:  Family History   Family history unknown: Yes       Past Surgical History:  Past Surgical History:   Procedure Laterality Date   • BACK SURGERY  2008   • CERVICAL SPINE SURGERY  2008   • KNEE SURGERY Right 2014       Problem List:  Patient Active Problem List   Diagnosis   • Essential hypertension   • Obesity (BMI 30-39.9)   • Tobacco abuse   • Chest pressure   • Psychosis (CMS/HCC)   • Acute psychosis (CMS/HCC)   • Chronic residual schizophrenia with acute exacerbations (CMS/HCC)   • Methamphetamine abuse (CMS/HCC)   • Anemia, iron deficiency       Allergy:   Allergies   Allergen Reactions   • Abilify [Aripiprazole] Mental Status Change        Current Medications:   Current Outpatient Medications   Medication Sig Dispense Refill   • amoxicillin (AMOXIL) 875 MG tablet      • atenolol (TENORMIN) 50 MG tablet   5   • " "escitalopram (LEXAPRO) 20 MG tablet Take one half daily  Indications: Major Depressive Disorder 15 tablet 2   • ferrous sulfate 325 (65 FE) MG tablet Take 1 tablet by mouth Daily With Breakfast. 100 tablet 0   • ibuprofen (ADVIL,MOTRIN) 600 MG tablet      • metFORMIN (GLUCOPHAGE) 500 MG tablet   1   • phentermine (ADIPEX-P) 37.5 MG tablet Take 37.5 mg by mouth Every Morning Before Breakfast.     • risperiDONE (risperDAL) 1 MG tablet Take 1 tablet by mouth 2 (Two) Times a Day. 60 tablet 2   • risperiDONE Microspheres ER (RisperDAL Consta) 25 MG injection Inject 2 mL into the appropriate muscle as directed by prescriber Every 14 (Fourteen) Days. 1 each 6     No current facility-administered medications for this visit.        Review of Symptoms:    Review of Systems   Constitutional: Positive for activity change and fatigue.   HENT: Negative.    Eyes: Negative.    Respiratory: Negative.    Cardiovascular: Negative.    Gastrointestinal: Negative.    Endocrine: Negative.    Genitourinary: Negative.    Musculoskeletal: Negative.    Allergic/Immunologic: Negative.    Neurological: Negative.    Hematological: Negative.    Psychiatric/Behavioral: The patient is nervous/anxious.          Physical Exam:   Blood pressure 138/93, pulse (!) 123, temperature 97 °F (36.1 °C), height 160 cm (62.99\"), weight 87.7 kg (193 lb 6.4 oz), not currently breastfeeding.    Appearance: Overweight  female of stated age in no acute distress  Gait, Station, Strength: WNL    Mental Status Exam:   Hygiene:   good  Cooperation:  Cooperative  Eye Contact:  Fair  Psychomotor Behavior:  Slow  Affect:  Blunted  Mood: normal  Hopelessness: Denies  Speech:  Normal and slow  Thought Process:  Goal directed and Linear  Thought Content:  Bizarre and Mood congruent  Suicidal:  None  Homicidal:  None  Hallucinations:  None  Delusion:  Paranoid and Other delusions of implant for spying and listening to thoughts  Memory:  Intact  Orientation:  Person, " Place, Time and Situation  Reliability:  fair  Insight:  Poor  Judgement:  Fair  Impulse Control:  Good  Physical/Medical Issues:  No        Lab Results:   No visits with results within 1 Month(s) from this visit.   Latest known visit with results is:   Office Visit on 11/26/2019   Component Date Value Ref Range Status   • External Amphetamine Screen Urine 11/26/2019 Positive   Final   • Amphetamine Cut-Off 11/26/2019 1000ng/ml   Final   • External Benzodiazepine Screen Uri* 11/26/2019 Negative   Final   • Benzodiazipine Cut-Off 11/26/2019 300ng/ml   Final   • External Cocaine Screen Urine 11/26/2019 Negative   Final   • Cocaine Cut-Off 11/26/2019 300ng/ml   Final   • External THC Screen Urine 11/26/2019 Negative   Final   • THC Cut-Off 11/26/2019 50ng/ml   Final   • External Methadone Screen Urine 11/26/2019 Negative   Final   • Methadone Cut-Off 11/26/2019 300ng/ml   Final   • External Methamphetamine Screen Ur* 11/26/2019 Positive   Final   • Methamphetamine Cut-Off 11/26/2019 1000ng/ml   Final   • External Oxycodone Screen Urine 11/26/2019 Negative   Final   • Oxycodone Cut-Off 11/26/2019 100ng/ml   Final   • External Buprenorphine Screen Urine 11/26/2019 Negative   Final   • Buprenorphine Cut-Off 11/26/2019 10ng/ml   Final   • External MDMA 11/26/2019 Negative   Final   • MDMA Cut-Off 11/26/2019 500ng/ml   Final   • External Opiates Screen Urine 11/26/2019 Negative   Final   • Opiates Cut-Off 11/26/2019 300ng/ml   Final       Assessment/Plan   Diagnoses and all orders for this visit:    Paranoid schizophrenia (CMS/HCC)  -     risperiDONE (risperDAL) 1 MG tablet; Take 1 tablet by mouth 2 (Two) Times a Day.  -     risperiDONE Microspheres ER (RisperDAL Consta) 25 MG injection; Inject 2 mL into the appropriate muscle as directed by prescriber Every 14 (Fourteen) Days.    Methamphetamine abuse (CMS/HCC)    Recurrent major depressive disorder, in full remission (CMS/HCC)  -     escitalopram (LEXAPRO) 20 MG tablet;  "Take one half daily  Indications: Major Depressive Disorder    -This is my initial encounter with the patient  -Patient presents for follow-up for schizophrenia.  She continues to have some delusions and paranoia that she has an implant in her head that allows, \"People,\" to spy on her and listen to her thoughts.  She also is having some mental fogginess, somnolence, and fatigue which may be due to her high dose of both oral and long-acting injectable antipsychotic.  She denies any depressive or anxious symptoms  -Reviewed previous available documentation  -Reviewed most recent available labs   -MARTIN reviewed and appropriate. Patient counseled on use of controlled substances.   -Continue Risperdal Consta 25 mg every 2 weeks for schizophrenia  -Reduce Risperdal 1.5 mg p.o. twice daily to 1 mg p.o. twice daily due to sedation and fatigue.  I advised patient to monitor her symptoms of schizophrenia and do have some concern about her continued delusion and paranoia about having an implanted ship for spying on her but patient is suffering from excessive fatigue and sedation during the day with mental fogginess and we could potentially increase her Risperdal Consta injection should delusions and paranoia worsen  -Continue Lexapro 10 mg p.o. daily for depression  -Patient advised that she has not been using methamphetamine recently.  I did not obtain a drug screen today as she had 1 at her last visit this year but do have concerns that her sedation could be due to intermittent methamphetamine use with subsequent crash.  -Encouraged continued cessation of methamphetamine  -AIMS negative     Visit Diagnoses:    ICD-10-CM ICD-9-CM   1. Paranoid schizophrenia (CMS/[facility])  F20.0 295.30   2. Methamphetamine abuse (CMS/[facility])  F15.10 305.70   3. Recurrent major depressive disorder, in full remission (CMS/[facility])  F33.42 296.36       TREATMENT PLAN/GOALS: Continue supportive psychotherapy efforts and medications as indicated. Treatment " and medication options discussed during today's visit. Patient acknowledged and verbally consented to continue with current treatment plan and was educated on the importance of compliance with treatment and follow-up appointments.    MEDICATION ISSUES:    Discussed medication options and treatment plan of prescribed medication as well as the risks, benefits, and side effects including potential falls, possible impaired driving and metabolic adversities among others. Patient is agreeable to call the office with any worsening of symptoms or onset of side effects. Patient is agreeable to call 911 or go to the nearest ER should he/she begin having SI/HI.     MEDS ORDERED DURING VISIT:  New Medications Ordered This Visit   Medications   • risperiDONE (risperDAL) 1 MG tablet     Sig: Take 1 tablet by mouth 2 (Two) Times a Day.     Dispense:  60 tablet     Refill:  2   • escitalopram (LEXAPRO) 20 MG tablet     Sig: Take one half daily  Indications: Major Depressive Disorder     Dispense:  15 tablet     Refill:  2   • risperiDONE Microspheres ER (RisperDAL Consta) 25 MG injection     Sig: Inject 2 mL into the appropriate muscle as directed by prescriber Every 14 (Fourteen) Days.     Dispense:  1 each     Refill:  6       Return in about 4 weeks (around 10/27/2020).             This document has been electronically signed by Naldo Murphy MD  October 1, 2020 12:07 EDT

## 2020-12-22 ENCOUNTER — OFFICE VISIT (OUTPATIENT)
Dept: PSYCHIATRY | Facility: CLINIC | Age: 45
End: 2020-12-22

## 2020-12-22 VITALS
BODY MASS INDEX: 35.26 KG/M2 | SYSTOLIC BLOOD PRESSURE: 124 MMHG | HEART RATE: 84 BPM | DIASTOLIC BLOOD PRESSURE: 74 MMHG | WEIGHT: 199 LBS | HEIGHT: 63 IN

## 2020-12-22 DIAGNOSIS — F20.0 PARANOID SCHIZOPHRENIA (HCC): Primary | ICD-10-CM

## 2020-12-22 DIAGNOSIS — F33.42 RECURRENT MAJOR DEPRESSIVE DISORDER, IN FULL REMISSION (HCC): ICD-10-CM

## 2020-12-22 DIAGNOSIS — F15.10 METHAMPHETAMINE ABUSE (HCC): ICD-10-CM

## 2020-12-22 PROCEDURE — 99214 OFFICE O/P EST MOD 30 MIN: CPT | Performed by: PSYCHIATRY & NEUROLOGY

## 2020-12-22 RX ORDER — ESCITALOPRAM OXALATE 20 MG/1
TABLET ORAL
Qty: 15 TABLET | Refills: 2 | Status: SHIPPED | OUTPATIENT
Start: 2020-12-22 | End: 2021-02-18 | Stop reason: SDUPTHER

## 2020-12-22 RX ORDER — BUPROPION HYDROCHLORIDE 150 MG/1
150 TABLET ORAL EVERY MORNING
Qty: 30 TABLET | Refills: 2 | Status: SHIPPED | OUTPATIENT
Start: 2020-12-22 | End: 2021-02-18 | Stop reason: SDUPTHER

## 2020-12-22 RX ORDER — RISPERIDONE 25 MG/2 ML
25 KIT INTRAMUSCULAR
Qty: 1 EACH | Refills: 6 | Status: SHIPPED | OUTPATIENT
Start: 2020-12-22 | End: 2021-02-18 | Stop reason: SDUPTHER

## 2020-12-22 RX ORDER — RISPERIDONE 1 MG/1
1 TABLET ORAL 2 TIMES DAILY
Qty: 60 TABLET | Refills: 2 | Status: SHIPPED | OUTPATIENT
Start: 2020-12-22 | End: 2021-02-18 | Stop reason: SDUPTHER

## 2020-12-29 NOTE — PROGRESS NOTES
"Subjective   Jazzy Valle is a 45 y.o. female who presents today for follow up    Chief Complaint: Schizophrenia    History of Present Illness: Patient is a 45-year-old  female presenting today for follow-up.  She reports that Wellbutrin made her cry a lot so she did not like the medication.  She feels her paranoia has been good.  She is going to go to her father's house for [employer].  Her mood and anxiety are well controlled.  She does state that she had a relapse 1/2 weeks ago.  When asked to clarify what she said, \"I do not know,\" and then when I followed up with a question about it being methamphetamine as she has abused in the past, she stated, \"probably.\"  She then asks about her chronic daytime fatigue and sleepiness.  She hands me a medication list of controlled stimulants to see if it would be a treatment option for her.  Advised that we could work on some treatment options but with her abuse history and current illicit use of substances, stimulants would not be a good idea.  She denies any other medication side effects.  She denies any issues with sleep or appetite.  She denies SI/HI/AVH.    The following portions of the patient's history were reviewed and updated as appropriate: allergies, current medications, past family history, past medical history, past social history, past surgical history and problem list.      Past Medical History:  Past Medical History:   Diagnosis Date   • Anxiety    • Depression    • Hypertension    • PCO (polycystic ovaries)    • Psychosis (CMS/HCC)    • Substance abuse (CMS/HCC)        Social History:  Social History     Socioeconomic History   • Marital status: Single     Spouse name: Not on file   • Number of children: Not on file   • Years of education: Not on file   • Highest education level: Not on file   Tobacco Use   • Smoking status: Current Every Day Smoker     Packs/day: 1.00     Years: 20.00     Pack years: 20.00     Types: Cigarettes   • Smokeless " "tobacco: Never Used   Substance and Sexual Activity   • Alcohol use: No   • Drug use: Yes     Comment: Reports \"occassional\" use.  When asked what drugs states \"whatever is around.\"   • Sexual activity: Defer       Family History:  Family History   Family history unknown: Yes       Past Surgical History:  Past Surgical History:   Procedure Laterality Date   • BACK SURGERY  2008   • CERVICAL SPINE SURGERY  2008   • KNEE SURGERY Right 2014       Problem List:  Patient Active Problem List   Diagnosis   • Essential hypertension   • Obesity (BMI 30-39.9)   • Tobacco abuse   • Chest pressure   • Psychosis (CMS/HCC)   • Acute psychosis (CMS/HCC)   • Chronic residual schizophrenia with acute exacerbations (CMS/HCC)   • Methamphetamine abuse (CMS/HCC)   • Anemia, iron deficiency       Allergy:   Allergies   Allergen Reactions   • Abilify [Aripiprazole] Mental Status Change        Current Medications:   Current Outpatient Medications   Medication Sig Dispense Refill   • atenolol (TENORMIN) 50 MG tablet   5   • escitalopram (LEXAPRO) 20 MG tablet Take one half daily  Indications: Major Depressive Disorder 15 tablet 2   • ibuprofen (ADVIL,MOTRIN) 600 MG tablet      • metFORMIN (GLUCOPHAGE) 500 MG tablet   1   • risperiDONE (risperDAL) 1 MG tablet Take 1 tablet by mouth 2 (Two) Times a Day. 60 tablet 2   • risperiDONE Microspheres ER (RisperDAL Consta) 25 MG injection Inject 2 mL into the appropriate muscle as directed by prescriber Every 14 (Fourteen) Days. 1 each 6   • buPROPion XL (Wellbutrin XL) 150 MG 24 hr tablet Take 1 tablet by mouth Every Morning. 30 tablet 2     No current facility-administered medications for this visit.        Review of Symptoms:    Review of Systems   Constitutional: Positive for activity change and fatigue.   HENT: Negative.    Eyes: Negative.    Respiratory: Negative.    Cardiovascular: Negative.    Gastrointestinal: Negative.    Endocrine: Negative.    Genitourinary: Negative.    Musculoskeletal: " "Negative.    Allergic/Immunologic: Negative.    Neurological: Negative.    Hematological: Negative.    Psychiatric/Behavioral: The patient is nervous/anxious.          Physical Exam:   Blood pressure 124/74, pulse 84, height 160 cm (62.99\"), weight 90.3 kg (199 lb), not currently breastfeeding.    Appearance: Overweight  female of stated age in no acute distress  Gait, Station, Strength: WNL    Mental Status Exam:   Hygiene:   good  Cooperation:  Cooperative  Eye Contact:  Fair  Psychomotor Behavior:  Slow  Affect:  Restricted  Mood: normal  Hopelessness: Denies  Speech:  Normal and slow  Thought Process:  Goal directed and Linear  Thought Content:  Bizarre and Mood congruent  Suicidal:  None  Homicidal:  None  Hallucinations:  None  Delusion:  Paranoid  Memory:  Intact  Orientation:  Person, Place, Time and Situation  Reliability:  fair  Insight:  Poor  Judgement:  Fair  Impulse Control:  Good  Physical/Medical Issues:  No        Lab Results:   No visits with results within 1 Month(s) from this visit.   Latest known visit with results is:   Office Visit on 11/26/2019   Component Date Value Ref Range Status   • External Amphetamine Screen Urine 11/26/2019 Positive   Final   • Amphetamine Cut-Off 11/26/2019 1000ng/ml   Final   • External Benzodiazepine Screen Uri* 11/26/2019 Negative   Final   • Benzodiazipine Cut-Off 11/26/2019 300ng/ml   Final   • External Cocaine Screen Urine 11/26/2019 Negative   Final   • Cocaine Cut-Off 11/26/2019 300ng/ml   Final   • External THC Screen Urine 11/26/2019 Negative   Final   • THC Cut-Off 11/26/2019 50ng/ml   Final   • External Methadone Screen Urine 11/26/2019 Negative   Final   • Methadone Cut-Off 11/26/2019 300ng/ml   Final   • External Methamphetamine Screen Ur* 11/26/2019 Positive   Final   • Methamphetamine Cut-Off 11/26/2019 1000ng/ml   Final   • External Oxycodone Screen Urine 11/26/2019 Negative   Final   • Oxycodone Cut-Off 11/26/2019 100ng/ml   Final   • " External Buprenorphine Screen Urine 11/26/2019 Negative   Final   • Buprenorphine Cut-Off 11/26/2019 10ng/ml   Final   • External MDMA 11/26/2019 Negative   Final   • MDMA Cut-Off 11/26/2019 500ng/ml   Final   • External Opiates Screen Urine 11/26/2019 Negative   Final   • Opiates Cut-Off 11/26/2019 300ng/ml   Final       Assessment/Plan   Diagnoses and all orders for this visit:    1. Paranoid schizophrenia (CMS/HCC) (Primary)  -     risperiDONE (risperDAL) 1 MG tablet; Take 1 tablet by mouth 2 (Two) Times a Day.  Dispense: 60 tablet; Refill: 2  -     risperiDONE Microspheres ER (RisperDAL Consta) 25 MG injection; Inject 2 mL into the appropriate muscle as directed by prescriber Every 14 (Fourteen) Days.  Dispense: 1 each; Refill: 6    2. Recurrent major depressive disorder, in full remission (CMS/HCC)  -     escitalopram (LEXAPRO) 20 MG tablet; Take one half daily  Indications: Major Depressive Disorder  Dispense: 15 tablet; Refill: 2  -     buPROPion XL (Wellbutrin XL) 150 MG 24 hr tablet; Take 1 tablet by mouth Every Morning.  Dispense: 30 tablet; Refill: 2    3. Methamphetamine abuse (CMS/HCC)    -Patient relatively stable and at baseline.  She is not having any worsening paranoid delusions or hallucinations and mood is stable.  She does report that she is having some daytime fatigue and is continuing to intermittently use methamphetamine.  -Reviewed previous available documentation  -Reviewed most recent available labs    -Continue Risperdal Consta 25 mg every 2 weeks for schizophrenia  -Continue Risperdal 1 mg p.o. twice daily for schizophrenia  -Increase Lexapro to 20 mg p.o. daily for dysphoria and fatigue  -Restart Wellbutrin but changed to Wellbutrin  mg p.o. daily due to her chronic daily fatigue  -Encourage cessation of methamphetamine.  Patient reports she is 1.5 weeks ago.  -AIMS negative     Visit Diagnoses:    ICD-10-CM ICD-9-CM   1. Paranoid schizophrenia (CMS/HCC)  F20.0 295.30   2.  Recurrent major depressive disorder, in full remission (CMS/[facility])  F33.42 296.36   3. Methamphetamine abuse (CMS/[facility])  F15.10 305.70       TREATMENT PLAN/GOALS: Continue supportive psychotherapy efforts and medications as indicated. Treatment and medication options discussed during today's visit. Patient acknowledged and verbally consented to continue with current treatment plan and was educated on the importance of compliance with treatment and follow-up appointments.    MEDICATION ISSUES:    Discussed medication options and treatment plan of prescribed medication as well as the risks, benefits, and side effects including potential falls, possible impaired driving and metabolic adversities among others. Patient is agreeable to call the office with any worsening of symptoms or onset of side effects. Patient is agreeable to call 911 or go to the nearest ER should he/she begin having SI/HI.     MEDS ORDERED DURING VISIT:  New Medications Ordered This Visit   Medications   • risperiDONE (risperDAL) 1 MG tablet     Sig: Take 1 tablet by mouth 2 (Two) Times a Day.     Dispense:  60 tablet     Refill:  2   • risperiDONE Microspheres ER (RisperDAL Consta) 25 MG injection     Sig: Inject 2 mL into the appropriate muscle as directed by prescriber Every 14 (Fourteen) Days.     Dispense:  1 each     Refill:  6   • escitalopram (LEXAPRO) 20 MG tablet     Sig: Take one half daily  Indications: Major Depressive Disorder     Dispense:  15 tablet     Refill:  2   • buPROPion XL (Wellbutrin XL) 150 MG 24 hr tablet     Sig: Take 1 tablet by mouth Every Morning.     Dispense:  30 tablet     Refill:  2       Return in about 3 months (around 3/22/2021).             This document has been electronically signed by Naldo Murphy MD  December 29, 2020 14:32 EST

## 2021-02-18 ENCOUNTER — TELEMEDICINE (OUTPATIENT)
Dept: PSYCHIATRY | Facility: CLINIC | Age: 46
End: 2021-02-18

## 2021-02-18 DIAGNOSIS — F15.21 METHAMPHETAMINE USE DISORDER, MODERATE, IN EARLY REMISSION, DEPENDENCE (HCC): ICD-10-CM

## 2021-02-18 DIAGNOSIS — F33.42 RECURRENT MAJOR DEPRESSIVE DISORDER, IN FULL REMISSION (HCC): ICD-10-CM

## 2021-02-18 DIAGNOSIS — F20.0 PARANOID SCHIZOPHRENIA (HCC): Primary | ICD-10-CM

## 2021-02-18 PROCEDURE — 99214 OFFICE O/P EST MOD 30 MIN: CPT | Performed by: PSYCHIATRY & NEUROLOGY

## 2021-02-18 RX ORDER — RISPERIDONE 25 MG/2 ML
25 KIT INTRAMUSCULAR
Qty: 1 EACH | Refills: 6 | Status: SHIPPED | OUTPATIENT
Start: 2021-02-18 | End: 2021-09-07

## 2021-02-18 RX ORDER — BUPROPION HYDROCHLORIDE 150 MG/1
150 TABLET ORAL EVERY MORNING
Qty: 30 TABLET | Refills: 2 | Status: ON HOLD | OUTPATIENT
Start: 2021-02-18 | End: 2021-09-27 | Stop reason: SDUPTHER

## 2021-02-18 RX ORDER — ESCITALOPRAM OXALATE 20 MG/1
20 TABLET ORAL DAILY
Qty: 30 TABLET | Refills: 2 | Status: ON HOLD | OUTPATIENT
Start: 2021-02-18 | End: 2021-09-27 | Stop reason: SDUPTHER

## 2021-02-18 RX ORDER — RISPERIDONE 1 MG/1
1 TABLET ORAL 2 TIMES DAILY
Qty: 60 TABLET | Refills: 2 | Status: SHIPPED | OUTPATIENT
Start: 2021-02-18 | End: 2021-09-27 | Stop reason: HOSPADM

## 2021-03-04 NOTE — PROGRESS NOTES
"Subjective   Jazzy Valle is a 45 y.o. female who presents today for follow up    Chief Complaint: Schizophrenia    This provider is located at [facility] at [address] [place] [address]. The provider identified himself as well as his credentials.   The Patient is at home using her phone because problems with video connection. The patient's condition being diagnosed/treated is appropriate for telemedicine. The patient gave consent to be seen remotely, and when consent is given they understand that the consent allows for patient identifiable information to be sent to a third party as needed.   They may refuse to be seen remotely at any time. The electronic data is encrypted and password protected, and the patient has been advised of the potential risks to privacy not withstanding such measures      History of Present Illness: Patient is a 45-year-old  female presenting today for follow-up.  Patient reports that she is, \"doing all right.\"  She states that the increase in Lexapro has been beneficial.  She has also not had any relapses on substances.  She reports no major mood or anxiety issues are current.  She is having some increased dry mouth from Lexapro.  She denies any other medication side effects.  She denies any issues with sleep or appetite.  She denies SI/HI/AVH.    The following portions of the patient's history were reviewed and updated as appropriate: allergies, current medications, past family history, past medical history, past social history, past surgical history and problem list.      Past Medical History:  Past Medical History:   Diagnosis Date   • Anxiety    • Depression    • Hypertension    • PCO (polycystic ovaries)    • Psychosis (CMS/[facility])    • Substance abuse (CMS/[facility])        Social History:  Social History     Socioeconomic History   • Marital status: Single     Spouse name: Not on file   • Number of children: Not on file   • Years of education: Not " "on file   • Highest education level: Not on file   Tobacco Use   • Smoking status: Current Every Day Smoker     Packs/day: 1.00     Years: 20.00     Pack years: 20.00     Types: Cigarettes   • Smokeless tobacco: Never Used   Substance and Sexual Activity   • Alcohol use: No   • Drug use: Yes     Comment: Reports \"occassional\" use.  When asked what drugs states \"whatever is around.\"   • Sexual activity: Defer       Family History:  Family History   Family history unknown: Yes       Past Surgical History:  Past Surgical History:   Procedure Laterality Date   • BACK SURGERY  2008   • CERVICAL SPINE SURGERY  2008   • KNEE SURGERY Right 2014       Problem List:  Patient Active Problem List   Diagnosis   • Essential hypertension   • Obesity (BMI 30-39.9)   • Tobacco abuse   • Chest pressure   • Psychosis (CMS/HCC)   • Acute psychosis (CMS/HCC)   • Chronic residual schizophrenia with acute exacerbations (CMS/HCC)   • Methamphetamine abuse (CMS/HCC)   • Anemia, iron deficiency       Allergy:   Allergies   Allergen Reactions   • Abilify [Aripiprazole] Mental Status Change        Current Medications:   Current Outpatient Medications   Medication Sig Dispense Refill   • atenolol (TENORMIN) 50 MG tablet   5   • buPROPion XL (Wellbutrin XL) 150 MG 24 hr tablet Take 1 tablet by mouth Every Morning. 30 tablet 2   • escitalopram (LEXAPRO) 20 MG tablet Take 1 tablet by mouth Daily. Indications: Major Depressive Disorder 30 tablet 2   • ibuprofen (ADVIL,MOTRIN) 600 MG tablet      • metFORMIN (GLUCOPHAGE) 500 MG tablet   1   • risperiDONE (risperDAL) 1 MG tablet Take 1 tablet by mouth 2 (Two) Times a Day. 60 tablet 2   • risperiDONE Microspheres ER (RisperDAL Consta) 25 MG injection Inject 2 mL into the appropriate muscle as directed by prescriber Every 14 (Fourteen) Days. 1 each 6     No current facility-administered medications for this visit.        Review of Symptoms:    Review of Systems   Constitutional: Negative for activity " change and fatigue.   HENT: Negative.    Eyes: Negative.    Respiratory: Negative.    Cardiovascular: Negative.    Gastrointestinal: Negative.    Endocrine: Negative.    Genitourinary: Negative.    Musculoskeletal: Negative.    Allergic/Immunologic: Negative.    Neurological: Negative.    Hematological: Negative.    Psychiatric/Behavioral: The patient is not nervous/anxious.          Physical Exam:   not currently breastfeeding.  Unable to assess, telephone visit    Appearance: Unable to assess, telephone visit  Gait, Station, Strength: Unable to assess, telephone visit    Mental Status Exam:   Hygiene:   Unable to assess, telephone visit  Cooperation:  Cooperative  Eye Contact:  Unable to assess, telephone visit  Psychomotor Behavior:  Unable to assess, telephone visit  Affect:  Restricted but improving  Mood: normal  Hopelessness: Denies  Speech:  Normal  Thought Process:  Goal directed and Linear  Thought Content:  Bizarre and Mood congruent  Suicidal:  None  Homicidal:  None  Hallucinations:  None  Delusion:  Paranoid intermittently  Memory:  Intact  Orientation:  Person, Place, Time and Situation  Reliability:  fair  Insight:  Poor but improving  Judgement:  Fair  Impulse Control:  Good  Physical/Medical Issues:  No        Lab Results:   No visits with results within 1 Month(s) from this visit.   Latest known visit with results is:   Office Visit on 11/26/2019   Component Date Value Ref Range Status   • External Amphetamine Screen Urine 11/26/2019 Positive   Final   • Amphetamine Cut-Off 11/26/2019 1000ng/ml   Final   • External Benzodiazepine Screen Uri* 11/26/2019 Negative   Final   • Benzodiazipine Cut-Off 11/26/2019 300ng/ml   Final   • External Cocaine Screen Urine 11/26/2019 Negative   Final   • Cocaine Cut-Off 11/26/2019 300ng/ml   Final   • External THC Screen Urine 11/26/2019 Negative   Final   • THC Cut-Off 11/26/2019 50ng/ml   Final   • External Methadone Screen Urine 11/26/2019 Negative   Final   •  Methadone Cut-Off 11/26/2019 300ng/ml   Final   • External Methamphetamine Screen Ur* 11/26/2019 Positive   Final   • Methamphetamine Cut-Off 11/26/2019 1000ng/ml   Final   • External Oxycodone Screen Urine 11/26/2019 Negative   Final   • Oxycodone Cut-Off 11/26/2019 100ng/ml   Final   • External Buprenorphine Screen Urine 11/26/2019 Negative   Final   • Buprenorphine Cut-Off 11/26/2019 10ng/ml   Final   • External MDMA 11/26/2019 Negative   Final   • MDMA Cut-Off 11/26/2019 500ng/ml   Final   • External Opiates Screen Urine 11/26/2019 Negative   Final   • Opiates Cut-Off 11/26/2019 300ng/ml   Final       Assessment/Plan   Diagnoses and all orders for this visit:    1. Paranoid schizophrenia (CMS/HCC) (Primary)  -     risperiDONE (risperDAL) 1 MG tablet; Take 1 tablet by mouth 2 (Two) Times a Day.  Dispense: 60 tablet; Refill: 2  -     risperiDONE Microspheres ER (RisperDAL Consta) 25 MG injection; Inject 2 mL into the appropriate muscle as directed by prescriber Every 14 (Fourteen) Days.  Dispense: 1 each; Refill: 6    2. Recurrent major depressive disorder, in full remission (CMS/HCC)  -     buPROPion XL (Wellbutrin XL) 150 MG 24 hr tablet; Take 1 tablet by mouth Every Morning.  Dispense: 30 tablet; Refill: 2  -     escitalopram (LEXAPRO) 20 MG tablet; Take 1 tablet by mouth Daily. Indications: Major Depressive Disorder  Dispense: 30 tablet; Refill: 2    3. Methamphetamine use disorder, moderate, in early remission, dependence (CMS/HCC)    -Patient continues to show improvement.  She feels mood and anxiety symptoms have improved and she is not having any overt paranoia or hallucinations at current.  She also has abstained from substances since last visit  -Reviewed previous available documentation  -Reviewed most recent available labs    -Continue Risperdal Consta 25 mg every 2 weeks for schizophrenia  -Continue Risperdal 1 mg p.o. twice daily for schizophrenia  -Continue Lexapro 20 mg p.o. daily for dysphoria and  fatigue  -Continue Wellbutrin  mg p.o. daily due to her chronic daily fatigue  -Encourage continued cessation of methamphetamine.  Patient has not had any relapses since last visit  -Approximate appointment time 11:30 AM to 12 PM via telephone visit due to technical difficulty with video visit and weather conditions    Visit Diagnoses:    ICD-10-CM ICD-9-CM   1. Paranoid schizophrenia (CMS/[facility])  F20.0 295.30   2. Recurrent major depressive disorder, in full remission (CMS/[facility])  F33.42 296.36   3. Methamphetamine use disorder, moderate, in early remission, dependence (CMS/[facility])  F15.21 304.43       TREATMENT PLAN/GOALS: Continue supportive psychotherapy efforts and medications as indicated. Treatment and medication options discussed during today's visit. Patient acknowledged and verbally consented to continue with current treatment plan and was educated on the importance of compliance with treatment and follow-up appointments.    MEDICATION ISSUES:    Discussed medication options and treatment plan of prescribed medication as well as the risks, benefits, and side effects including potential falls, possible impaired driving and metabolic adversities among others. Patient is agreeable to call the office with any worsening of symptoms or onset of side effects. Patient is agreeable to call 911 or go to the nearest ER should he/she begin having SI/HI.     MEDS ORDERED DURING VISIT:  New Medications Ordered This Visit   Medications   • risperiDONE (risperDAL) 1 MG tablet     Sig: Take 1 tablet by mouth 2 (Two) Times a Day.     Dispense:  60 tablet     Refill:  2   • risperiDONE Microspheres ER (RisperDAL Consta) 25 MG injection     Sig: Inject 2 mL into the appropriate muscle as directed by prescriber Every 14 (Fourteen) Days.     Dispense:  1 each     Refill:  6   • buPROPion XL (Wellbutrin XL) 150 MG 24 hr tablet     Sig: Take 1 tablet by mouth Every Morning.     Dispense:  30 tablet     Refill:  2   • escitalopram  (LEXAPRO) 20 MG tablet     Sig: Take 1 tablet by mouth Daily. Indications: Major Depressive Disorder     Dispense:  30 tablet     Refill:  2       Return in about 3 months (around 5/18/2021).             This document has been electronically signed by Naldo Murphy MD  March 4, 2021 07:41 EST

## 2021-07-14 NOTE — PROGRESS NOTES
Patient sent a letter to the clinic that has been scanned to chart discussing her request for head imaging or x-ray due to the fact that she feels that there may be a microchip in speaker implanted causing paranoia.  The letter was indicative of continued paranoia and delusions.  Patient has a history of methamphetamine abuse intermittently and may be using which could contribute to presentation.  I had discussed her delusion of a microchip implanted into her head on our first visit together and she has not mentioned it since that time.  I attempted to call the patient to discuss this with her as there is no medical need for head imaging at this point in time and I was hoping to discuss other treatment options and help patient find some alleviation in her paranoia and anxiety by coming up with a joint treatment plan.  There was no answer on the number listed in the number listed in the handwritten letter is different from the number in the chart.  I called both numbers to no avail.

## 2021-08-06 ENCOUNTER — TRANSCRIBE ORDERS (OUTPATIENT)
Dept: ADMINISTRATIVE | Facility: HOSPITAL | Age: 46
End: 2021-08-06

## 2021-08-06 ENCOUNTER — HOSPITAL ENCOUNTER (OUTPATIENT)
Dept: GENERAL RADIOLOGY | Facility: HOSPITAL | Age: 46
End: 2021-08-06

## 2021-08-06 ENCOUNTER — TRANSCRIBE ORDERS (OUTPATIENT)
Dept: GENERAL RADIOLOGY | Facility: HOSPITAL | Age: 46
End: 2021-08-06

## 2021-08-06 ENCOUNTER — HOSPITAL ENCOUNTER (OUTPATIENT)
Dept: GENERAL RADIOLOGY | Facility: HOSPITAL | Age: 46
Discharge: HOME | End: 2021-08-06

## 2021-08-06 DIAGNOSIS — R51.9 NONINTRACTABLE HEADACHE, UNSPECIFIED CHRONICITY PATTERN, UNSPECIFIED HEADACHE TYPE: ICD-10-CM

## 2021-08-06 DIAGNOSIS — R51.9 NONINTRACTABLE HEADACHE, UNSPECIFIED CHRONICITY PATTERN, UNSPECIFIED HEADACHE TYPE: Primary | ICD-10-CM

## 2021-08-06 PROCEDURE — 72040 X-RAY EXAM NECK SPINE 2-3 VW: CPT

## 2021-08-06 PROCEDURE — 70250 X-RAY EXAM OF SKULL: CPT | Performed by: RADIOLOGY

## 2021-08-06 PROCEDURE — 72040 X-RAY EXAM NECK SPINE 2-3 VW: CPT | Performed by: RADIOLOGY

## 2021-08-06 PROCEDURE — 70250 X-RAY EXAM OF SKULL: CPT

## 2021-09-07 DIAGNOSIS — F20.0 PARANOID SCHIZOPHRENIA (HCC): ICD-10-CM

## 2021-09-07 RX ORDER — RISPERIDONE 25 MG/2 ML
KIT INTRAMUSCULAR
Qty: 1 EACH | Refills: 6 | Status: SHIPPED | OUTPATIENT
Start: 2021-09-07 | End: 2021-09-27 | Stop reason: HOSPADM

## 2021-09-22 ENCOUNTER — HOSPITAL ENCOUNTER (EMERGENCY)
Facility: HOSPITAL | Age: 46
Discharge: PSYCHIATRIC OR SUBSTANCE USE FACILITY | End: 2021-09-22
Attending: EMERGENCY MEDICINE | Admitting: EMERGENCY MEDICINE

## 2021-09-22 ENCOUNTER — HOSPITAL ENCOUNTER (INPATIENT)
Facility: HOSPITAL | Age: 46
LOS: 5 days | Discharge: HOME | End: 2021-09-27
Attending: PSYCHIATRY & NEUROLOGY | Admitting: PSYCHIATRY & NEUROLOGY

## 2021-09-22 VITALS
HEIGHT: 63 IN | WEIGHT: 200 LBS | HEART RATE: 84 BPM | BODY MASS INDEX: 35.44 KG/M2 | TEMPERATURE: 97.9 F | SYSTOLIC BLOOD PRESSURE: 144 MMHG | RESPIRATION RATE: 16 BRPM | OXYGEN SATURATION: 98 % | DIASTOLIC BLOOD PRESSURE: 84 MMHG

## 2021-09-22 DIAGNOSIS — F33.42 RECURRENT MAJOR DEPRESSIVE DISORDER, IN FULL REMISSION (HCC): ICD-10-CM

## 2021-09-22 DIAGNOSIS — F19.10 POLYSUBSTANCE ABUSE (HCC): ICD-10-CM

## 2021-09-22 DIAGNOSIS — D64.9 ANEMIA, UNSPECIFIED TYPE: Primary | ICD-10-CM

## 2021-09-22 DIAGNOSIS — R44.3 HALLUCINATIONS: ICD-10-CM

## 2021-09-22 LAB
ALBUMIN SERPL-MCNC: 3.88 G/DL (ref 3.5–5.2)
ALBUMIN/GLOB SERPL: 1.5 G/DL
ALP SERPL-CCNC: 85 U/L (ref 39–117)
ALT SERPL W P-5'-P-CCNC: 19 U/L (ref 1–33)
AMPHET+METHAMPHET UR QL: POSITIVE
AMPHETAMINES UR QL: POSITIVE
ANION GAP SERPL CALCULATED.3IONS-SCNC: 9.6 MMOL/L (ref 5–15)
ANISOCYTOSIS BLD QL: NORMAL
AST SERPL-CCNC: 23 U/L (ref 1–32)
B-HCG UR QL: NEGATIVE
BARBITURATES UR QL SCN: NEGATIVE
BASOPHILS # BLD AUTO: 0.04 10*3/MM3 (ref 0–0.2)
BASOPHILS NFR BLD AUTO: 0.6 % (ref 0–1.5)
BENZODIAZ UR QL SCN: NEGATIVE
BILIRUB SERPL-MCNC: 0.2 MG/DL (ref 0–1.2)
BILIRUB UR QL STRIP: NEGATIVE
BUN SERPL-MCNC: 6 MG/DL (ref 6–20)
BUN/CREAT SERPL: 7.8 (ref 7–25)
BUPRENORPHINE SERPL-MCNC: NEGATIVE NG/ML
CALCIUM SPEC-SCNC: 9 MG/DL (ref 8.6–10.5)
CANNABINOIDS SERPL QL: NEGATIVE
CHLORIDE SERPL-SCNC: 103 MMOL/L (ref 98–107)
CLARITY UR: CLEAR
CO2 SERPL-SCNC: 25.4 MMOL/L (ref 22–29)
COCAINE UR QL: NEGATIVE
COLOR UR: YELLOW
CREAT SERPL-MCNC: 0.77 MG/DL (ref 0.57–1)
DEPRECATED RDW RBC AUTO: 49.5 FL (ref 37–54)
EOSINOPHIL # BLD AUTO: 0 10*3/MM3 (ref 0–0.4)
EOSINOPHIL NFR BLD AUTO: 0 % (ref 0.3–6.2)
ERYTHROCYTE [DISTWIDTH] IN BLOOD BY AUTOMATED COUNT: 19.4 % (ref 12.3–15.4)
ETHANOL BLD-MCNC: <10 MG/DL (ref 0–10)
ETHANOL UR QL: <0.01 %
FLUAV RNA RESP QL NAA+PROBE: NOT DETECTED
FLUBV RNA RESP QL NAA+PROBE: NOT DETECTED
GFR SERPL CREATININE-BSD FRML MDRD: 81 ML/MIN/1.73
GLOBULIN UR ELPH-MCNC: 2.6 GM/DL
GLUCOSE SERPL-MCNC: 105 MG/DL (ref 65–99)
GLUCOSE UR STRIP-MCNC: NEGATIVE MG/DL
HCT VFR BLD AUTO: 27.2 % (ref 34–46.6)
HGB BLD-MCNC: 7.5 G/DL (ref 12–15.9)
HGB UR QL STRIP.AUTO: NEGATIVE
HOLD SPECIMEN: NORMAL
HOLD SPECIMEN: NORMAL
HYPOCHROMIA BLD QL: NORMAL
IMM GRANULOCYTES # BLD AUTO: 0.02 10*3/MM3 (ref 0–0.05)
IMM GRANULOCYTES NFR BLD AUTO: 0.3 % (ref 0–0.5)
KETONES UR QL STRIP: NEGATIVE
LEUKOCYTE ESTERASE UR QL STRIP.AUTO: NEGATIVE
LYMPHOCYTES # BLD AUTO: 1.22 10*3/MM3 (ref 0.7–3.1)
LYMPHOCYTES NFR BLD AUTO: 17.5 % (ref 19.6–45.3)
MAGNESIUM SERPL-MCNC: 1.8 MG/DL (ref 1.6–2.6)
MCH RBC QN AUTO: 19.7 PG (ref 26.6–33)
MCHC RBC AUTO-ENTMCNC: 27.6 G/DL (ref 31.5–35.7)
MCV RBC AUTO: 71.4 FL (ref 79–97)
METHADONE UR QL SCN: NEGATIVE
MICROCYTES BLD QL: NORMAL
MONOCYTES # BLD AUTO: 0.64 10*3/MM3 (ref 0.1–0.9)
MONOCYTES NFR BLD AUTO: 9.2 % (ref 5–12)
NEUTROPHILS NFR BLD AUTO: 5.04 10*3/MM3 (ref 1.7–7)
NEUTROPHILS NFR BLD AUTO: 72.4 % (ref 42.7–76)
NITRITE UR QL STRIP: NEGATIVE
NRBC BLD AUTO-RTO: 0 /100 WBC (ref 0–0.2)
OPIATES UR QL: NEGATIVE
OXYCODONE UR QL SCN: NEGATIVE
PCP UR QL SCN: NEGATIVE
PH UR STRIP.AUTO: 6 [PH] (ref 5–8)
PLAT MORPH BLD: NORMAL
PLATELET # BLD AUTO: 273 10*3/MM3 (ref 140–450)
PMV BLD AUTO: 10.2 FL (ref 6–12)
POTASSIUM SERPL-SCNC: 3.7 MMOL/L (ref 3.5–5.2)
PROPOXYPH UR QL: NEGATIVE
PROT SERPL-MCNC: 6.5 G/DL (ref 6–8.5)
PROT UR QL STRIP: NEGATIVE
RBC # BLD AUTO: 3.81 10*6/MM3 (ref 3.77–5.28)
SARS-COV-2 RNA RESP QL NAA+PROBE: NOT DETECTED
SODIUM SERPL-SCNC: 138 MMOL/L (ref 136–145)
SP GR UR STRIP: 1.02 (ref 1–1.03)
TRICYCLICS UR QL SCN: NEGATIVE
TSH SERPL DL<=0.05 MIU/L-ACNC: 1.6 UIU/ML (ref 0.27–4.2)
UROBILINOGEN UR QL STRIP: NORMAL
WBC # BLD AUTO: 6.96 10*3/MM3 (ref 3.4–10.8)
WHOLE BLOOD HOLD SPECIMEN: NORMAL
WHOLE BLOOD HOLD SPECIMEN: NORMAL

## 2021-09-22 PROCEDURE — 83735 ASSAY OF MAGNESIUM: CPT | Performed by: PHYSICIAN ASSISTANT

## 2021-09-22 PROCEDURE — 87636 SARSCOV2 & INF A&B AMP PRB: CPT | Performed by: PHYSICIAN ASSISTANT

## 2021-09-22 PROCEDURE — 85025 COMPLETE CBC W/AUTO DIFF WBC: CPT | Performed by: PHYSICIAN ASSISTANT

## 2021-09-22 PROCEDURE — C9803 HOPD COVID-19 SPEC COLLECT: HCPCS

## 2021-09-22 PROCEDURE — 84443 ASSAY THYROID STIM HORMONE: CPT | Performed by: PHYSICIAN ASSISTANT

## 2021-09-22 PROCEDURE — 81003 URINALYSIS AUTO W/O SCOPE: CPT | Performed by: PHYSICIAN ASSISTANT

## 2021-09-22 PROCEDURE — 80053 COMPREHEN METABOLIC PANEL: CPT | Performed by: PHYSICIAN ASSISTANT

## 2021-09-22 PROCEDURE — 99284 EMERGENCY DEPT VISIT MOD MDM: CPT

## 2021-09-22 PROCEDURE — 82077 ASSAY SPEC XCP UR&BREATH IA: CPT | Performed by: PHYSICIAN ASSISTANT

## 2021-09-22 PROCEDURE — 93005 ELECTROCARDIOGRAM TRACING: CPT | Performed by: PSYCHIATRY & NEUROLOGY

## 2021-09-22 PROCEDURE — 81025 URINE PREGNANCY TEST: CPT | Performed by: PHYSICIAN ASSISTANT

## 2021-09-22 PROCEDURE — 85007 BL SMEAR W/DIFF WBC COUNT: CPT | Performed by: PHYSICIAN ASSISTANT

## 2021-09-22 PROCEDURE — 80306 DRUG TEST PRSMV INSTRMNT: CPT | Performed by: PHYSICIAN ASSISTANT

## 2021-09-22 RX ORDER — TRAZODONE HYDROCHLORIDE 50 MG/1
50 TABLET ORAL NIGHTLY PRN
Status: DISCONTINUED | OUTPATIENT
Start: 2021-09-22 | End: 2021-09-27 | Stop reason: HOSPADM

## 2021-09-22 RX ORDER — BENZTROPINE MESYLATE 1 MG/ML
1 INJECTION INTRAMUSCULAR; INTRAVENOUS ONCE AS NEEDED
Status: DISCONTINUED | OUTPATIENT
Start: 2021-09-22 | End: 2021-09-27 | Stop reason: HOSPADM

## 2021-09-22 RX ORDER — LOPERAMIDE HYDROCHLORIDE 2 MG/1
2 CAPSULE ORAL
Status: DISCONTINUED | OUTPATIENT
Start: 2021-09-22 | End: 2021-09-27 | Stop reason: HOSPADM

## 2021-09-22 RX ORDER — ECHINACEA PURPUREA EXTRACT 125 MG
2 TABLET ORAL AS NEEDED
Status: DISCONTINUED | OUTPATIENT
Start: 2021-09-22 | End: 2021-09-27 | Stop reason: HOSPADM

## 2021-09-22 RX ORDER — ALUMINA, MAGNESIA, AND SIMETHICONE 2400; 2400; 240 MG/30ML; MG/30ML; MG/30ML
15 SUSPENSION ORAL EVERY 6 HOURS PRN
Status: DISCONTINUED | OUTPATIENT
Start: 2021-09-22 | End: 2021-09-27 | Stop reason: HOSPADM

## 2021-09-22 RX ORDER — IBUPROFEN 400 MG/1
400 TABLET ORAL EVERY 6 HOURS PRN
Status: DISCONTINUED | OUTPATIENT
Start: 2021-09-22 | End: 2021-09-27 | Stop reason: HOSPADM

## 2021-09-22 RX ORDER — ONDANSETRON 4 MG/1
4 TABLET, FILM COATED ORAL EVERY 6 HOURS PRN
Status: DISCONTINUED | OUTPATIENT
Start: 2021-09-22 | End: 2021-09-27 | Stop reason: HOSPADM

## 2021-09-22 RX ORDER — BENZTROPINE MESYLATE 1 MG/1
2 TABLET ORAL ONCE AS NEEDED
Status: DISCONTINUED | OUTPATIENT
Start: 2021-09-22 | End: 2021-09-27 | Stop reason: HOSPADM

## 2021-09-22 RX ORDER — NICOTINE 21 MG/24HR
1 PATCH, TRANSDERMAL 24 HOURS TRANSDERMAL
Status: DISCONTINUED | OUTPATIENT
Start: 2021-09-23 | End: 2021-09-27 | Stop reason: HOSPADM

## 2021-09-22 RX ORDER — HYDROXYZINE 50 MG/1
50 TABLET, FILM COATED ORAL EVERY 6 HOURS PRN
Status: DISCONTINUED | OUTPATIENT
Start: 2021-09-22 | End: 2021-09-27 | Stop reason: HOSPADM

## 2021-09-22 RX ORDER — FAMOTIDINE 20 MG/1
20 TABLET, FILM COATED ORAL 2 TIMES DAILY PRN
Status: DISCONTINUED | OUTPATIENT
Start: 2021-09-22 | End: 2021-09-27 | Stop reason: HOSPADM

## 2021-09-22 RX ORDER — ACETAMINOPHEN 325 MG/1
650 TABLET ORAL EVERY 6 HOURS PRN
Status: DISCONTINUED | OUTPATIENT
Start: 2021-09-22 | End: 2021-09-27 | Stop reason: HOSPADM

## 2021-09-22 RX ORDER — BENZONATATE 100 MG/1
100 CAPSULE ORAL 3 TIMES DAILY PRN
Status: DISCONTINUED | OUTPATIENT
Start: 2021-09-22 | End: 2021-09-27 | Stop reason: HOSPADM

## 2021-09-22 RX ADMIN — IBUPROFEN 400 MG: 400 TABLET, FILM COATED ORAL at 21:49

## 2021-09-22 RX ADMIN — HYDROXYZINE HYDROCHLORIDE 50 MG: 50 TABLET ORAL at 22:13

## 2021-09-23 LAB
HAV IGM SERPL QL IA: NORMAL
HBV CORE IGM SERPL QL IA: NORMAL
HBV SURFACE AG SERPL QL IA: NORMAL
HCV AB SER DONR QL: NORMAL
HIV1+2 AB SER QL: NORMAL
QT INTERVAL: 378 MS
QTC INTERVAL: 464 MS

## 2021-09-23 PROCEDURE — 93010 ELECTROCARDIOGRAM REPORT: CPT | Performed by: INTERNAL MEDICINE

## 2021-09-23 PROCEDURE — G0432 EIA HIV-1/HIV-2 SCREEN: HCPCS | Performed by: PSYCHIATRY & NEUROLOGY

## 2021-09-23 PROCEDURE — 80074 ACUTE HEPATITIS PANEL: CPT | Performed by: PSYCHIATRY & NEUROLOGY

## 2021-09-23 PROCEDURE — 99223 1ST HOSP IP/OBS HIGH 75: CPT | Performed by: PSYCHIATRY & NEUROLOGY

## 2021-09-23 RX ORDER — BUPROPION HYDROCHLORIDE 150 MG/1
150 TABLET ORAL EVERY MORNING
Status: DISCONTINUED | OUTPATIENT
Start: 2021-09-23 | End: 2021-09-27 | Stop reason: HOSPADM

## 2021-09-23 RX ORDER — FUROSEMIDE 20 MG/1
20 TABLET ORAL DAILY PRN
COMMUNITY
End: 2021-09-27 | Stop reason: HOSPADM

## 2021-09-23 RX ORDER — POTASSIUM CHLORIDE 750 MG/1
10 TABLET, FILM COATED, EXTENDED RELEASE ORAL DAILY PRN
COMMUNITY
End: 2021-09-27 | Stop reason: HOSPADM

## 2021-09-23 RX ORDER — FUROSEMIDE 20 MG/1
20 TABLET ORAL DAILY PRN
Status: CANCELLED | OUTPATIENT
Start: 2021-09-23

## 2021-09-23 RX ORDER — FERROUS SULFATE 325(65) MG
325 TABLET ORAL 2 TIMES DAILY WITH MEALS
Status: DISCONTINUED | OUTPATIENT
Start: 2021-09-23 | End: 2021-09-27 | Stop reason: HOSPADM

## 2021-09-23 RX ORDER — ESCITALOPRAM OXALATE 10 MG/1
20 TABLET ORAL DAILY
Status: DISCONTINUED | OUTPATIENT
Start: 2021-09-23 | End: 2021-09-27 | Stop reason: HOSPADM

## 2021-09-23 RX ORDER — POTASSIUM CHLORIDE 20 MEQ/1
10 TABLET, EXTENDED RELEASE ORAL DAILY PRN
Status: CANCELLED | OUTPATIENT
Start: 2021-09-23

## 2021-09-23 RX ORDER — RISPERIDONE 1 MG/1
1 TABLET ORAL 2 TIMES DAILY
Status: DISCONTINUED | OUTPATIENT
Start: 2021-09-23 | End: 2021-09-24

## 2021-09-23 RX ADMIN — METFORMIN HYDROCHLORIDE 500 MG: 500 TABLET ORAL at 09:11

## 2021-09-23 RX ADMIN — RISPERIDONE 1 MG: 1 TABLET, FILM COATED ORAL at 09:11

## 2021-09-23 RX ADMIN — FERROUS SULFATE TAB 325 MG (65 MG ELEMENTAL FE) 325 MG: 325 (65 FE) TAB at 09:12

## 2021-09-23 RX ADMIN — RISPERIDONE 1 MG: 1 TABLET, FILM COATED ORAL at 20:12

## 2021-09-23 RX ADMIN — IBUPROFEN 400 MG: 400 TABLET, FILM COATED ORAL at 14:37

## 2021-09-23 RX ADMIN — FERROUS SULFATE TAB 325 MG (65 MG ELEMENTAL FE) 325 MG: 325 (65 FE) TAB at 17:09

## 2021-09-23 RX ADMIN — ESCITALOPRAM 20 MG: 10 TABLET, FILM COATED ORAL at 09:12

## 2021-09-23 RX ADMIN — BENZONATATE 100 MG: 100 CAPSULE ORAL at 20:12

## 2021-09-23 RX ADMIN — NICOTINE TRANSDERMAL SYSTEM 1 PATCH: 21 PATCH, EXTENDED RELEASE TRANSDERMAL at 09:12

## 2021-09-23 RX ADMIN — BENZONATATE 100 MG: 100 CAPSULE ORAL at 11:31

## 2021-09-23 RX ADMIN — BUPROPION HYDROCHLORIDE 150 MG: 150 TABLET, FILM COATED, EXTENDED RELEASE ORAL at 09:11

## 2021-09-24 PROCEDURE — 99232 SBSQ HOSP IP/OBS MODERATE 35: CPT | Performed by: PSYCHIATRY & NEUROLOGY

## 2021-09-24 RX ORDER — RISPERIDONE 1 MG/1
3 TABLET ORAL NIGHTLY
Status: DISCONTINUED | OUTPATIENT
Start: 2021-09-24 | End: 2021-09-27 | Stop reason: HOSPADM

## 2021-09-24 RX ADMIN — IBUPROFEN 400 MG: 400 TABLET, FILM COATED ORAL at 10:59

## 2021-09-24 RX ADMIN — BUPROPION HYDROCHLORIDE 150 MG: 150 TABLET, FILM COATED, EXTENDED RELEASE ORAL at 06:15

## 2021-09-24 RX ADMIN — NICOTINE TRANSDERMAL SYSTEM 1 PATCH: 21 PATCH, EXTENDED RELEASE TRANSDERMAL at 09:13

## 2021-09-24 RX ADMIN — METFORMIN HYDROCHLORIDE 500 MG: 500 TABLET ORAL at 09:05

## 2021-09-24 RX ADMIN — FERROUS SULFATE TAB 325 MG (65 MG ELEMENTAL FE) 325 MG: 325 (65 FE) TAB at 17:50

## 2021-09-24 RX ADMIN — IBUPROFEN 400 MG: 400 TABLET, FILM COATED ORAL at 16:55

## 2021-09-24 RX ADMIN — ESCITALOPRAM 20 MG: 10 TABLET, FILM COATED ORAL at 09:05

## 2021-09-24 RX ADMIN — ACETAMINOPHEN 650 MG: 325 TABLET ORAL at 20:20

## 2021-09-24 RX ADMIN — RISPERIDONE 3 MG: 1 TABLET, FILM COATED ORAL at 20:20

## 2021-09-24 RX ADMIN — FERROUS SULFATE TAB 325 MG (65 MG ELEMENTAL FE) 325 MG: 325 (65 FE) TAB at 09:05

## 2021-09-25 PROCEDURE — 99232 SBSQ HOSP IP/OBS MODERATE 35: CPT | Performed by: PSYCHIATRY & NEUROLOGY

## 2021-09-25 RX ADMIN — ESCITALOPRAM 20 MG: 10 TABLET, FILM COATED ORAL at 08:48

## 2021-09-25 RX ADMIN — METFORMIN HYDROCHLORIDE 500 MG: 500 TABLET ORAL at 08:49

## 2021-09-25 RX ADMIN — FERROUS SULFATE TAB 325 MG (65 MG ELEMENTAL FE) 325 MG: 325 (65 FE) TAB at 17:07

## 2021-09-25 RX ADMIN — BUPROPION HYDROCHLORIDE 150 MG: 150 TABLET, FILM COATED, EXTENDED RELEASE ORAL at 06:12

## 2021-09-25 RX ADMIN — FERROUS SULFATE TAB 325 MG (65 MG ELEMENTAL FE) 325 MG: 325 (65 FE) TAB at 08:48

## 2021-09-25 RX ADMIN — IBUPROFEN 400 MG: 400 TABLET, FILM COATED ORAL at 20:10

## 2021-09-25 RX ADMIN — RISPERIDONE 3 MG: 1 TABLET, FILM COATED ORAL at 20:57

## 2021-09-25 RX ADMIN — NICOTINE TRANSDERMAL SYSTEM 1 PATCH: 21 PATCH, EXTENDED RELEASE TRANSDERMAL at 08:49

## 2021-09-25 RX ADMIN — BENZONATATE 100 MG: 100 CAPSULE ORAL at 08:49

## 2021-09-26 PROCEDURE — 99232 SBSQ HOSP IP/OBS MODERATE 35: CPT | Performed by: PSYCHIATRY & NEUROLOGY

## 2021-09-26 RX ADMIN — FERROUS SULFATE TAB 325 MG (65 MG ELEMENTAL FE) 325 MG: 325 (65 FE) TAB at 17:16

## 2021-09-26 RX ADMIN — ESCITALOPRAM 20 MG: 10 TABLET, FILM COATED ORAL at 08:09

## 2021-09-26 RX ADMIN — METFORMIN HYDROCHLORIDE 500 MG: 500 TABLET ORAL at 08:09

## 2021-09-26 RX ADMIN — RISPERIDONE 3 MG: 1 TABLET, FILM COATED ORAL at 20:40

## 2021-09-26 RX ADMIN — BUPROPION HYDROCHLORIDE 150 MG: 150 TABLET, FILM COATED, EXTENDED RELEASE ORAL at 06:00

## 2021-09-26 RX ADMIN — NICOTINE TRANSDERMAL SYSTEM 1 PATCH: 21 PATCH, EXTENDED RELEASE TRANSDERMAL at 08:09

## 2021-09-26 RX ADMIN — IBUPROFEN 400 MG: 400 TABLET, FILM COATED ORAL at 16:25

## 2021-09-26 RX ADMIN — FERROUS SULFATE TAB 325 MG (65 MG ELEMENTAL FE) 325 MG: 325 (65 FE) TAB at 08:09

## 2021-09-27 VITALS
RESPIRATION RATE: 18 BRPM | WEIGHT: 205.4 LBS | HEIGHT: 63 IN | BODY MASS INDEX: 36.39 KG/M2 | OXYGEN SATURATION: 95 % | TEMPERATURE: 97.2 F | SYSTOLIC BLOOD PRESSURE: 136 MMHG | DIASTOLIC BLOOD PRESSURE: 92 MMHG | HEART RATE: 114 BPM

## 2021-09-27 PROBLEM — F15.20 METHAMPHETAMINE USE DISORDER, SEVERE, DEPENDENCE (HCC): Status: ACTIVE | Noted: 2019-10-02

## 2021-09-27 PROCEDURE — 99239 HOSP IP/OBS DSCHRG MGMT >30: CPT | Performed by: PSYCHIATRY & NEUROLOGY

## 2021-09-27 RX ORDER — FERROUS SULFATE 325(65) MG
325 TABLET ORAL 2 TIMES DAILY WITH MEALS
Qty: 60 TABLET | Refills: 0 | Status: SHIPPED | OUTPATIENT
Start: 2021-09-27 | End: 2023-03-21

## 2021-09-27 RX ORDER — RISPERIDONE 3 MG/1
3 TABLET ORAL NIGHTLY
Qty: 30 TABLET | Refills: 0 | Status: SHIPPED | OUTPATIENT
Start: 2021-09-27 | End: 2022-03-11 | Stop reason: SDUPTHER

## 2021-09-27 RX ORDER — BUPROPION HYDROCHLORIDE 150 MG/1
150 TABLET ORAL EVERY MORNING
Qty: 30 TABLET | Refills: 0 | Status: SHIPPED | OUTPATIENT
Start: 2021-09-27 | End: 2022-03-11 | Stop reason: SDUPTHER

## 2021-09-27 RX ORDER — ESCITALOPRAM OXALATE 20 MG/1
20 TABLET ORAL DAILY
Qty: 30 TABLET | Refills: 0 | Status: SHIPPED | OUTPATIENT
Start: 2021-09-27 | End: 2022-03-11 | Stop reason: SDUPTHER

## 2021-09-27 RX ORDER — PALIPERIDONE PALMITATE 156 MG/ML
156 INJECTION INTRAMUSCULAR ONCE
Qty: 1 ML | Refills: 0 | Status: SHIPPED | OUTPATIENT
Start: 2021-10-04 | End: 2021-10-19

## 2021-09-27 RX ORDER — PALIPERIDONE PALMITATE 156 MG/ML
156 INJECTION INTRAMUSCULAR ONCE
Qty: 1 ML | Refills: 0 | Status: SHIPPED | OUTPATIENT
Start: 2021-10-04 | End: 2021-09-27 | Stop reason: SDUPTHER

## 2021-09-27 RX ADMIN — ESCITALOPRAM 20 MG: 10 TABLET, FILM COATED ORAL at 08:41

## 2021-09-27 RX ADMIN — METFORMIN HYDROCHLORIDE 500 MG: 500 TABLET ORAL at 08:41

## 2021-09-27 RX ADMIN — IBUPROFEN 400 MG: 400 TABLET, FILM COATED ORAL at 04:41

## 2021-09-27 RX ADMIN — BUPROPION HYDROCHLORIDE 150 MG: 150 TABLET, FILM COATED, EXTENDED RELEASE ORAL at 08:41

## 2021-09-27 RX ADMIN — NICOTINE TRANSDERMAL SYSTEM 1 PATCH: 21 PATCH, EXTENDED RELEASE TRANSDERMAL at 08:41

## 2021-09-27 RX ADMIN — FERROUS SULFATE TAB 325 MG (65 MG ELEMENTAL FE) 325 MG: 325 (65 FE) TAB at 08:41

## 2021-10-01 NOTE — DISCHARGE SUMMARY
"      PSYCHIATRIC DISCHARGE SUMMARY     Patient Identification:  Name:  Jazzy Valle  Age:  46 y.o.  Sex:  female  :  1975  MRN:  [id number]  Visit Number:  [id number]    Date of Admission:2021   Date of Discharge: 2021     Discharge Diagnosis:  Active Problems:    Chronic residual schizophrenia with acute exacerbations (HCC)    Methamphetamine use disorder, severe, dependence (HCC)    Anemia, iron deficiency      Admission Diagnosis:  Psychosis ([facility]) [F29]     Hospital Course  Patient is a 46 y.o. female presented with mood disturbance, psychosis and SI.  Admitted for crisis stabilization.  History of schizophrenia methamphetamine abuse.  UDS positive for methamphetamine.  Patient found to be anemic to 7.5/27.2, with MCV of 71.4, consistent with previously diagnosed iron deficient anemia.  Home medications continued, including risperidone 1 mg twice daily, Escitalopram 20 mg daily, Wellbutrin  mg daily.  Patient resumed on iron supplementation.  Patient prescribed Risperdal Consta injections every 2 weeks, with last administration the day prior to admission.  Over the course of her stay, risperidone was increased and consolidated to 3 mg nightly.  She was strongly encouraged to attend rehab, but refused, preferring to continue outpatient care.  She elected to transition from Risperdal Consta to Invega Sustenna, with the next dose due in 8 to 10 days from discharge.  Prescription sent to home pharmacy.  Patient reported improvement of symptoms and was considered appropriate for hospital discharge today.    On the day of discharge, patient denied SI, HI or AVH. Patient was stable and appropriate by the conclusion of this admission, denying significant symptoms of mood, psychotic or thought disorder. Patient showed improvement of presenting symptoms and was deemed appropriate for discharge today.    Mental Status Exam upon discharge:   Mood \" better\"   Affect-congruent, appropriate, " stable  Thought Content-goal directed, no delusional material present  Thought process-linear, organized.  Suicidality: No SI  Homicidality: No HI  Perception: No AH/VH    Procedures Performed         Consults:   Consults     No orders found from 8/24/2021 to 9/23/2021.          Pertinent Test Results:   Lab Results (last 7 days)     Procedure Component Value Units Date/Time    HIV-1 / O / 2 Ag / Antibody 4th Generation [[id number]]  (Normal) Collected: 09/23/21 0447    Specimen: Blood Updated: 09/23/21 0548     HIV-1/ HIV-2 Non-Reactive     Comment: A non-reactive test result does not preclude the possibility of exposure to HIV or infection with HIV. An antibody response to recent exposure may take several months to reach detectable levels.       Narrative:      The HIV antibody/antigen combo assay is a qualitative assay for HIV that includes the p24 antigen as well as antibodies to HIV types 1 and 2. This test is intended to be used as a screening assay in the diagnosis of HIV infection in patients over the age of 2.  Results may be falsely decreased if patient taking Biotin.      Hepatitis Panel, Acute [[id number]]  (Normal) Collected: 09/23/21 0447    Specimen: Blood Updated: 09/23/21 0547     Hepatitis B Surface Ag Non-Reactive     Hep A IgM Non-Reactive     Hep B C IgM Non-Reactive     Hepatitis C Ab Non-Reactive    Narrative:      Results may be falsely decreased if patient taking Biotin.           Condition on Discharge:  improved    Vital Signs       Discharge Disposition:  Home or Self Care    Discharge Medications:     Discharge Medications      New Medications      Instructions Start Date   FeroSul 325 (65 FE) MG tablet  Generic drug: ferrous sulfate   325 mg, Oral, 2 Times Daily With Meals      Invega Sustenna 156 MG/ML suspension prefilled syringe IM injection  Generic drug: paliperidone palmitate   156 mg, Intramuscular, Once   Start Date: October 4, 2021        Changes to Medications      Instructions  Start Date   metFORMIN 500 MG tablet  Commonly known as: GLUCOPHAGE  What changed:   · how much to take  · how to take this  · when to take this   500 mg, Oral, Daily With Breakfast      risperiDONE 3 MG tablet  Commonly known as: risperDAL  What changed:   · medication strength  · how much to take  · when to take this   3 mg, Oral, Nightly         Continue These Medications      Instructions Start Date   buPROPion  MG 24 hr tablet  Commonly known as: Wellbutrin XL   150 mg, Oral, Every Morning      escitalopram 20 MG tablet  Commonly known as: LEXAPRO   20 mg, Oral, Daily         Stop These Medications    furosemide 20 MG tablet  Commonly known as: LASIX     ibuprofen 600 MG tablet  Commonly known as: ADVIL,MOTRIN     potassium chloride 10 MEQ CR tablet     RisperDAL Consta 25 MG injection  Generic drug: risperiDONE Microspheres ER            Discharge Diet: Normal  Diet Instructions     AS TOLERATED            Activity at Discharge: Normal  Activity Instructions     AS TOLERATED            Follow-up Appointments  No future appointments.      Test Results Pending at Discharge  None     Time: I spent greater than 30 minutes on this discharge activity which included: face-to-face encounter with the patient, reviewing the data in the system, coordination of the care with the nursing staff as well as consultants, documentation, and entering orders.      Clinician:   Balta Moon MD  10/01/21  12:44 EDT

## 2021-10-19 RX ORDER — PALIPERIDONE PALMITATE 156 MG/ML
INJECTION INTRAMUSCULAR
Qty: 1 ML | Refills: 0 | Status: SHIPPED | OUTPATIENT
Start: 2021-10-19 | End: 2022-03-11 | Stop reason: SDUPTHER

## 2021-11-20 ENCOUNTER — NURSE TRIAGE (OUTPATIENT)
Dept: CALL CENTER | Facility: HOSPITAL | Age: 46
End: 2021-11-20

## 2021-11-20 NOTE — TELEPHONE ENCOUNTER
"    Reason for Disposition  • [1] SEVERE pain (e.g., excruciating, unable to do any normal activities) AND [2] not improved 2 hours after pain medicine    Additional Information  • Negative: Shock suspected (e.g., cold/pale/clammy skin, too weak to stand, low BP, rapid pulse)  • Negative: [1] Similar pain previously AND [2] it was from \"heart attack\"  • Negative: [1] Similar pain previously AND [2] it was from \"angina\" AND [3] not relieved by nitroglycerin  • Negative: Sounds like a life-threatening emergency to the triager  • Negative: Chest pain  • Negative: Toothache followed tooth injury  • Negative: Toothache or mouth pain after tooth extraction  • Negative: Canker sore (i.e., aphthous ulcer)  • Negative: Tongue is very swollen and tender  • Negative: [1] Face is swollen AND [2] fever  • Negative: Patient sounds very sick or weak to the triager  • Negative: Face is very swollen    Answer Assessment - Initial Assessment Questions  1. LOCATION: \"Which tooth is hurting?\"  (e.g., right-side/left-side, upper/lower, front/back)    [place] left top  2. ONSET: \"When did the toothache start?\"  (e.g., hours, days)     Day before yesterday  3. SEVERITY: \"How bad is the toothache?\"  (Scale 1-10; mild, moderate or severe)    - MILD (1-3): doesn't interfere with chewing     - MODERATE (4-7): interferes with chewing, interferes with normal activities, awakens from sleep      - SEVERE (8-10): unable to eat, unable to do any normal activities, excruciating pain         9/10  4. SWELLING: \"Is there any visible swelling of your face?\"    denies  5. OTHER SYMPTOMS: \"Do you have any other symptoms?\" (e.g., fever)  No fever  6. PREGNANCY: \"Is there any chance you are pregnant?\" \"When was your last menstrual period?\"  na    Protocols used: TOOTHACHE-ADULT-AH      "

## 2021-11-30 RX ORDER — PALIPERIDONE PALMITATE 156 MG/ML
INJECTION INTRAMUSCULAR
Qty: 1 ML | Refills: 0 | OUTPATIENT
Start: 2021-11-30

## 2021-12-20 ENCOUNTER — TRANSCRIBE ORDERS (OUTPATIENT)
Dept: ADMINISTRATIVE | Facility: HOSPITAL | Age: 46
End: 2021-12-20

## 2021-12-20 DIAGNOSIS — R13.10 DYSPHAGIA, UNSPECIFIED TYPE: Primary | ICD-10-CM

## 2021-12-29 RX ORDER — PALIPERIDONE PALMITATE 156 MG/ML
INJECTION INTRAMUSCULAR
Qty: 1 ML | Refills: 0 | OUTPATIENT
Start: 2021-12-29

## 2022-02-03 ENCOUNTER — HOSPITAL ENCOUNTER (OUTPATIENT)
Dept: ULTRASOUND IMAGING | Facility: HOSPITAL | Age: 47
Discharge: HOME | End: 2022-02-03
Admitting: REGISTERED NURSE

## 2022-02-03 DIAGNOSIS — R13.10 DYSPHAGIA, UNSPECIFIED TYPE: ICD-10-CM

## 2022-02-03 PROCEDURE — 76536 US EXAM OF HEAD AND NECK: CPT

## 2022-02-03 PROCEDURE — 76536 US EXAM OF HEAD AND NECK: CPT | Performed by: RADIOLOGY

## 2022-03-03 RX ORDER — PALIPERIDONE PALMITATE 156 MG/ML
INJECTION INTRAMUSCULAR
Qty: 1 ML | Refills: 0 | OUTPATIENT
Start: 2022-03-03

## 2022-03-10 RX ORDER — PALIPERIDONE PALMITATE 156 MG/ML
INJECTION INTRAMUSCULAR
Qty: 1 ML | Refills: 0 | OUTPATIENT
Start: 2022-03-10

## 2022-03-11 DIAGNOSIS — F33.42 RECURRENT MAJOR DEPRESSIVE DISORDER, IN FULL REMISSION: ICD-10-CM

## 2022-03-11 RX ORDER — ESCITALOPRAM OXALATE 20 MG/1
20 TABLET ORAL DAILY
Qty: 30 TABLET | Refills: 0 | Status: SHIPPED | OUTPATIENT
Start: 2022-03-11 | End: 2023-03-21 | Stop reason: SDUPTHER

## 2022-03-11 RX ORDER — PALIPERIDONE PALMITATE 156 MG/ML
156 INJECTION INTRAMUSCULAR
Qty: 1 ML | Refills: 2 | Status: SHIPPED | OUTPATIENT
Start: 2022-03-11 | End: 2022-04-01

## 2022-03-11 RX ORDER — RISPERIDONE 3 MG/1
3 TABLET ORAL NIGHTLY
Qty: 30 TABLET | Refills: 0 | Status: SHIPPED | OUTPATIENT
Start: 2022-03-11 | End: 2023-03-21 | Stop reason: SDUPTHER

## 2022-03-11 RX ORDER — BUPROPION HYDROCHLORIDE 150 MG/1
150 TABLET ORAL EVERY MORNING
Qty: 30 TABLET | Refills: 0 | Status: SHIPPED | OUTPATIENT
Start: 2022-03-11 | End: 2023-03-21

## 2022-03-11 NOTE — TELEPHONE ENCOUNTER
Patient was under the care of  and is out of refills. She has an appointment scheduled with you on 5/9/22

## 2022-03-17 DIAGNOSIS — F33.42 RECURRENT MAJOR DEPRESSIVE DISORDER, IN FULL REMISSION: ICD-10-CM

## 2022-03-17 RX ORDER — ESCITALOPRAM OXALATE 20 MG/1
TABLET ORAL
Qty: 30 TABLET | Refills: 2 | OUTPATIENT
Start: 2022-03-17

## 2022-03-17 RX ORDER — BUPROPION HYDROCHLORIDE 150 MG/1
TABLET ORAL
Qty: 30 TABLET | Refills: 2 | OUTPATIENT
Start: 2022-03-17

## 2022-03-17 RX ORDER — PALIPERIDONE PALMITATE 156 MG/ML
INJECTION INTRAMUSCULAR
Qty: 1 ML | Refills: 0 | OUTPATIENT
Start: 2022-03-17

## 2022-04-01 RX ORDER — PALIPERIDONE PALMITATE 156 MG/ML
INJECTION INTRAMUSCULAR
Qty: 1 ML | Refills: 0 | Status: SHIPPED | OUTPATIENT
Start: 2022-04-01 | End: 2022-05-02

## 2022-05-02 RX ORDER — PALIPERIDONE PALMITATE 156 MG/ML
INJECTION INTRAMUSCULAR
Qty: 1 ML | Refills: 0 | Status: SHIPPED | OUTPATIENT
Start: 2022-05-02 | End: 2022-06-02

## 2022-06-02 ENCOUNTER — HOSPITAL ENCOUNTER (EMERGENCY)
Facility: HOSPITAL | Age: 47
Discharge: STILL ADMITTED OR ADMINISTRATIVE | End: 2022-06-02

## 2022-06-02 RX ORDER — PALIPERIDONE PALMITATE 156 MG/ML
INJECTION INTRAMUSCULAR
Qty: 1 ML | Refills: 0 | Status: SHIPPED | OUTPATIENT
Start: 2022-06-02 | End: 2022-06-29

## 2022-06-09 ENCOUNTER — LAB (OUTPATIENT)
Dept: LAB | Facility: HOSPITAL | Age: 47
End: 2022-06-09

## 2022-06-09 ENCOUNTER — TRANSCRIBE ORDERS (OUTPATIENT)
Dept: ADMINISTRATIVE | Facility: HOSPITAL | Age: 47
End: 2022-06-09

## 2022-06-09 DIAGNOSIS — I10 HTN (HYPERTENSION), BENIGN: ICD-10-CM

## 2022-06-09 DIAGNOSIS — E78.5 HYPERLIPIDEMIA, UNSPECIFIED HYPERLIPIDEMIA TYPE: ICD-10-CM

## 2022-06-09 DIAGNOSIS — R53.83 FATIGUE, UNSPECIFIED TYPE: ICD-10-CM

## 2022-06-09 DIAGNOSIS — R53.83 FATIGUE, UNSPECIFIED TYPE: Primary | ICD-10-CM

## 2022-06-09 LAB
25(OH)D3 SERPL-MCNC: 35.9 NG/ML (ref 30–100)
ALBUMIN SERPL-MCNC: 4 G/DL (ref 3.5–5.2)
ALBUMIN/GLOB SERPL: 2 G/DL
ALP SERPL-CCNC: 88 U/L (ref 39–117)
ALT SERPL W P-5'-P-CCNC: 64 U/L (ref 1–33)
ANION GAP SERPL CALCULATED.3IONS-SCNC: 9 MMOL/L (ref 5–15)
ANISOCYTOSIS BLD QL: ABNORMAL
AST SERPL-CCNC: 56 U/L (ref 1–32)
BILIRUB SERPL-MCNC: <0.2 MG/DL (ref 0–1.2)
BUN SERPL-MCNC: 5 MG/DL (ref 6–20)
BUN/CREAT SERPL: 7.1 (ref 7–25)
CALCIUM SPEC-SCNC: 8.4 MG/DL (ref 8.6–10.5)
CHLORIDE SERPL-SCNC: 103 MMOL/L (ref 98–107)
CHOLEST SERPL-MCNC: 93 MG/DL (ref 0–200)
CO2 SERPL-SCNC: 25 MMOL/L (ref 22–29)
CREAT SERPL-MCNC: 0.7 MG/DL (ref 0.57–1)
DEPRECATED RDW RBC AUTO: 41.1 FL (ref 37–54)
EGFRCR SERPLBLD CKD-EPI 2021: 108.2 ML/MIN/1.73
ERYTHROCYTE [DISTWIDTH] IN BLOOD BY AUTOMATED COUNT: 15.9 % (ref 12.3–15.4)
GLOBULIN UR ELPH-MCNC: 2 GM/DL
GLUCOSE SERPL-MCNC: 89 MG/DL (ref 65–99)
HBA1C MFR BLD: 5.4 % (ref 4.8–5.6)
HCT VFR BLD AUTO: 25.9 % (ref 34–46.6)
HDLC SERPL-MCNC: 46 MG/DL (ref 40–60)
HGB BLD-MCNC: 7.6 G/DL (ref 12–15.9)
LDLC SERPL CALC-MCNC: 33 MG/DL (ref 0–100)
LDLC/HDLC SERPL: 0.77 {RATIO}
LYMPHOCYTES # BLD MANUAL: 0.72 10*3/MM3 (ref 0.7–3.1)
LYMPHOCYTES NFR BLD MANUAL: 4.2 % (ref 5–12)
MCH RBC QN AUTO: 21.5 PG (ref 26.6–33)
MCHC RBC AUTO-ENTMCNC: 29.3 G/DL (ref 31.5–35.7)
MCV RBC AUTO: 73.2 FL (ref 79–97)
MICROCYTES BLD QL: ABNORMAL
MONOCYTES # BLD: 0.17 10*3/MM3 (ref 0.1–0.9)
NEUTROPHILS # BLD AUTO: 3.15 10*3/MM3 (ref 1.7–7)
NEUTROPHILS NFR BLD MANUAL: 77.9 % (ref 42.7–76)
PLAT MORPH BLD: NORMAL
PLATELET # BLD AUTO: 252 10*3/MM3 (ref 140–450)
PMV BLD AUTO: 11.3 FL (ref 6–12)
POTASSIUM SERPL-SCNC: 3.6 MMOL/L (ref 3.5–5.2)
PROT SERPL-MCNC: 6 G/DL (ref 6–8.5)
RBC # BLD AUTO: 3.54 10*6/MM3 (ref 3.77–5.28)
SODIUM SERPL-SCNC: 137 MMOL/L (ref 136–145)
TRIGL SERPL-MCNC: 59 MG/DL (ref 0–150)
TSH SERPL DL<=0.05 MIU/L-ACNC: 3.38 UIU/ML (ref 0.27–4.2)
VARIANT LYMPHS NFR BLD MANUAL: 17.9 % (ref 19.6–45.3)
VIT B12 BLD-MCNC: >2000 PG/ML (ref 211–946)
VLDLC SERPL-MCNC: 14 MG/DL (ref 5–40)
WBC MORPH BLD: NORMAL
WBC NRBC COR # BLD: 4.04 10*3/MM3 (ref 3.4–10.8)

## 2022-06-09 PROCEDURE — 85025 COMPLETE CBC W/AUTO DIFF WBC: CPT

## 2022-06-09 PROCEDURE — 80053 COMPREHEN METABOLIC PANEL: CPT

## 2022-06-09 PROCEDURE — 84443 ASSAY THYROID STIM HORMONE: CPT

## 2022-06-09 PROCEDURE — 80061 LIPID PANEL: CPT

## 2022-06-09 PROCEDURE — 85007 BL SMEAR W/DIFF WBC COUNT: CPT

## 2022-06-09 PROCEDURE — 82607 VITAMIN B-12: CPT

## 2022-06-09 PROCEDURE — 82306 VITAMIN D 25 HYDROXY: CPT

## 2022-06-09 PROCEDURE — 36415 COLL VENOUS BLD VENIPUNCTURE: CPT

## 2022-06-09 PROCEDURE — 83036 HEMOGLOBIN GLYCOSYLATED A1C: CPT

## 2022-06-29 RX ORDER — PALIPERIDONE PALMITATE 156 MG/ML
INJECTION INTRAMUSCULAR
Qty: 1 ML | Refills: 0 | Status: SHIPPED | OUTPATIENT
Start: 2022-06-29 | End: 2022-07-08 | Stop reason: SDUPTHER

## 2022-07-08 RX ORDER — PALIPERIDONE PALMITATE 156 MG/ML
156 INJECTION INTRAMUSCULAR
Qty: 1 ML | Refills: 2 | Status: SHIPPED | OUTPATIENT
Start: 2022-07-08 | End: 2022-12-02 | Stop reason: SDUPTHER

## 2022-08-10 ENCOUNTER — TELEPHONE (OUTPATIENT)
Dept: PSYCHIATRY | Facility: CLINIC | Age: 47
End: 2022-08-10

## 2022-08-10 NOTE — TELEPHONE ENCOUNTER
We understand unexpected circumstances arise; however, anytime you miss your appointment we are unable to provide you appropriate care.  In addition, each appointment missed could have been used to provide care for others.  We ask that you call at least 24 hours in advance to cancel or reschedule an appointment.  We would like to take this opportunity to remind you of our policy stating patients who miss three or more appointments without cancelling or rescheduling 24 hours in advance of the appointment may be subject to dismissal from the practice. Educated patient of No-Show-Policy.

## 2022-08-25 NOTE — TELEPHONE ENCOUNTER
I last saw the patient in February. She is due for an appointment but I do not see that she has one scheduled. Please have her get an appointment to follow-up with me. Please have him take her to the ER if he feels she is a danger to herself or others so that she can be evaluated. Please encouraged him to speak with the staff at the hospital to express his concerns so that they may address them. Thank you.    none known

## 2022-11-18 ENCOUNTER — HOSPITAL ENCOUNTER (OUTPATIENT)
Dept: MAMMOGRAPHY | Facility: HOSPITAL | Age: 47
Discharge: HOME | End: 2022-11-18
Admitting: NURSE PRACTITIONER

## 2022-11-18 DIAGNOSIS — Z12.31 VISIT FOR SCREENING MAMMOGRAM: ICD-10-CM

## 2022-11-18 PROCEDURE — 77067 SCR MAMMO BI INCL CAD: CPT

## 2022-11-18 PROCEDURE — 77067 SCR MAMMO BI INCL CAD: CPT | Performed by: RADIOLOGY

## 2022-11-18 PROCEDURE — 77063 BREAST TOMOSYNTHESIS BI: CPT | Performed by: RADIOLOGY

## 2022-11-18 PROCEDURE — 77063 BREAST TOMOSYNTHESIS BI: CPT

## 2022-11-21 ENCOUNTER — APPOINTMENT (OUTPATIENT)
Dept: MAMMOGRAPHY | Facility: HOSPITAL | Age: 47
End: 2022-11-21

## 2022-12-06 RX ORDER — PALIPERIDONE PALMITATE 156 MG/ML
156 INJECTION INTRAMUSCULAR
Qty: 1 ML | Refills: 2 | Status: SHIPPED | OUTPATIENT
Start: 2022-12-06 | End: 2023-03-21 | Stop reason: SDUPTHER

## 2022-12-06 RX ORDER — PALIPERIDONE PALMITATE 156 MG/ML
156 INJECTION INTRAMUSCULAR
Qty: 1 ML | Refills: 2 | Status: SHIPPED | OUTPATIENT
Start: 2022-12-06 | End: 2022-12-06 | Stop reason: SDUPTHER

## 2022-12-06 NOTE — TELEPHONE ENCOUNTER
Patient father called and asked if he could get the Invega shot switched over to [facility] in [place] instead

## 2023-01-04 ENCOUNTER — TELEPHONE (OUTPATIENT)
Dept: MAMMOGRAPHY | Facility: HOSPITAL | Age: 48
End: 2023-01-04
Payer: MEDICARE

## 2023-01-04 NOTE — TELEPHONE ENCOUNTER
Certified reminder letter sent to reschedule diagnostic imaging from no show appointment on 1/3/2023.

## 2023-03-21 ENCOUNTER — OFFICE VISIT (OUTPATIENT)
Dept: PSYCHIATRY | Facility: CLINIC | Age: 48
End: 2023-03-21
Payer: MEDICARE

## 2023-03-21 VITALS
SYSTOLIC BLOOD PRESSURE: 108 MMHG | DIASTOLIC BLOOD PRESSURE: 81 MMHG | BODY MASS INDEX: 38.41 KG/M2 | WEIGHT: 216.8 LBS | HEIGHT: 63 IN | HEART RATE: 105 BPM

## 2023-03-21 DIAGNOSIS — F33.42 RECURRENT MAJOR DEPRESSIVE DISORDER, IN FULL REMISSION: ICD-10-CM

## 2023-03-21 DIAGNOSIS — F20.0 PARANOID SCHIZOPHRENIA: Primary | ICD-10-CM

## 2023-03-21 PROCEDURE — 1160F RVW MEDS BY RX/DR IN RCRD: CPT | Performed by: PSYCHIATRY & NEUROLOGY

## 2023-03-21 PROCEDURE — 1159F MED LIST DOCD IN RCRD: CPT | Performed by: PSYCHIATRY & NEUROLOGY

## 2023-03-21 PROCEDURE — 3074F SYST BP LT 130 MM HG: CPT | Performed by: PSYCHIATRY & NEUROLOGY

## 2023-03-21 PROCEDURE — 99214 OFFICE O/P EST MOD 30 MIN: CPT | Performed by: PSYCHIATRY & NEUROLOGY

## 2023-03-21 PROCEDURE — 3079F DIAST BP 80-89 MM HG: CPT | Performed by: PSYCHIATRY & NEUROLOGY

## 2023-03-21 RX ORDER — RISPERIDONE 3 MG/1
3 TABLET ORAL NIGHTLY
Qty: 30 TABLET | Refills: 2 | Status: SHIPPED | OUTPATIENT
Start: 2023-03-21 | End: 2023-03-21 | Stop reason: SDUPTHER

## 2023-03-21 RX ORDER — OMEPRAZOLE 40 MG/1
CAPSULE, DELAYED RELEASE ORAL
COMMUNITY
Start: 2023-01-09 | End: 2023-03-21

## 2023-03-21 RX ORDER — ATENOLOL 50 MG/1
50 TABLET ORAL DAILY
COMMUNITY
Start: 2023-01-09

## 2023-03-21 RX ORDER — TIZANIDINE 4 MG/1
TABLET ORAL
COMMUNITY
Start: 2022-12-07 | End: 2023-03-21

## 2023-03-21 RX ORDER — IBUPROFEN 800 MG/1
TABLET ORAL
COMMUNITY
Start: 2022-12-07 | End: 2023-03-21

## 2023-03-21 RX ORDER — FAMOTIDINE 20 MG/1
TABLET, FILM COATED ORAL
COMMUNITY
Start: 2023-01-09 | End: 2023-03-21

## 2023-03-21 RX ORDER — ESCITALOPRAM OXALATE 20 MG/1
20 TABLET ORAL DAILY
Qty: 30 TABLET | Refills: 2 | Status: SHIPPED | OUTPATIENT
Start: 2023-03-21

## 2023-03-21 RX ORDER — RISPERIDONE 1 MG/1
1 TABLET ORAL NIGHTLY
Qty: 30 TABLET | Refills: 2 | Status: SHIPPED | OUTPATIENT
Start: 2023-03-21

## 2023-03-21 RX ORDER — PALIPERIDONE PALMITATE 156 MG/ML
156 INJECTION INTRAMUSCULAR
Qty: 1 ML | Refills: 2 | Status: SHIPPED | OUTPATIENT
Start: 2023-03-21

## 2023-03-21 NOTE — PROGRESS NOTES
Subjective   Jazzy Valle is a 47 y.o. female who presents today for follow up    Chief Complaint: Schizophrenia      History of Present Illness: Patient presenting today for follow-up.  She reports that since last visit, she has stopped taking all of her medications aside from the injectable.  She reports that she had missed some follow-up appointments and then did not have refills at the pharmacy.  Overall she has been doing relatively well with mood and anxiety symptoms being stable and patient not experiencing any significant symptoms of schizophrenia.  She does endorse that her main complaint is sometimes when she goes out in public, she feels somewhat paranoid or feels like things are touching her that are not touching her.  She is unsure if this is related to anxiety or possible hallucinations but we will treat as both.  We discussed restarting Risperdal and Lexapro to see if this helps and patient was amenable to this.  She is not having any current medication side effects.  She denies any issues with sleep or appetite.  She denies SI/HI/AVH.     The following portions of the patient's history were reviewed and updated as appropriate: allergies, current medications, past family history, past medical history, past social history, past surgical history and problem list.      Past Medical History:  Past Medical History:   Diagnosis Date   • Anxiety    • Bipolar disorder (HCC)    • Depression    • Hypertension    • PCO (polycystic ovaries)    • Psychosis (HCC)    • Schizoaffective disorder (HCC)    • Substance abuse (HCC)        Social History:  Social History     Socioeconomic History   • Marital status: Single   Tobacco Use   • Smoking status: Every Day     Packs/day: 1.00     Years: 20.00     Pack years: 20.00     Types: Cigarettes   • Smokeless tobacco: Never   Vaping Use   • Vaping Use: Some days   • Substances: Nicotine, Flavoring   • Devices: Disposable   Substance and Sexual Activity   • Alcohol use: No    • Drug use: Yes     Types: Methamphetamines   • Sexual activity: Defer       Family History:  Family History   Problem Relation Age of Onset   • Breast cancer Neg Hx        Past Surgical History:  Past Surgical History:   Procedure Laterality Date   • BACK SURGERY  2008   • CERVICAL SPINE SURGERY  2008   • KNEE SURGERY Right 2014       Problem List:  Patient Active Problem List   Diagnosis   • Essential hypertension   • Obesity (BMI 30-39.9)   • Tobacco abuse   • Chest pressure   • Psychosis (HCC)   • Acute psychosis (HCC)   • Chronic residual schizophrenia with acute exacerbations (HCC)   • Methamphetamine use disorder, severe, dependence (HCC)   • Anemia, iron deficiency       Allergy:   Allergies   Allergen Reactions   • Abilify [Aripiprazole] Mental Status Change        Current Medications:   Current Outpatient Medications   Medication Sig Dispense Refill   • atenolol (TENORMIN) 50 MG tablet Take 1 tablet by mouth Daily. for blood pressure     • paliperidone palmitate (Invega Sustenna) 156 MG/ML suspension prefilled syringe IM injection Inject 1 mL into the appropriate muscle as directed by prescriber Every 30 (Thirty) Days. 1 mL 2   • buPROPion XL (Wellbutrin XL) 150 MG 24 hr tablet Take 1 tablet by mouth Every Morning for 30 days. Indications: Major Depressive Disorder 30 tablet 0   • escitalopram (LEXAPRO) 20 MG tablet Take 1 tablet by mouth Daily. Indications: Major Depressive Disorder (Patient not taking: Reported on 3/21/2023) 30 tablet 0   • famotidine (PEPCID) 20 MG tablet TAKE ONE TABLET BY MOUTH EVERY 12 HOURS AS NEEDED FOR THE STOMACH (Patient not taking: Reported on 3/21/2023)     • ferrous sulfate 325 (65 FE) MG tablet Take 1 tablet by mouth 2 (Two) Times a Day With Meals. Indications: Anemia From Inadequate Iron in the Body (Patient not taking: Reported on 3/21/2023) 60 tablet 0   • ibuprofen (ADVIL,MOTRIN) 800 MG tablet TAKE ONE TABLET BY MOUTH EVERY 12 HOURS AS NEEDED FOR FEVER OR PAIN (Patient  "not taking: Reported on 3/21/2023)     • metFORMIN (GLUCOPHAGE) 500 MG tablet Take 1 tablet by mouth Daily With Breakfast. Indications: Antipsychotic Therapy-Induced Weight Gain (Patient not taking: Reported on 3/21/2023) 30 tablet 0   • mupirocin (BACTROBAN) 2 % ointment APPLY TO AFFECTED AREA TWO TIMES PER DAY (Patient not taking: Reported on 3/21/2023)     • omeprazole (priLOSEC) 40 MG capsule TAKE ONE CAPSULE BY MOUTH EVERY MORNING FOR THE STOMACH (Patient not taking: Reported on 3/21/2023)     • risperiDONE (risperDAL) 3 MG tablet Take 1 tablet by mouth Every Night. Indications: Schizophrenia (Patient not taking: Reported on 3/21/2023) 30 tablet 0   • tiZANidine (ZANAFLEX) 4 MG tablet TAKE ONE TABLET BY MOUTH EVERY NIGHT AT BEDTIME FOR MUSCLE SPASMS (Patient not taking: Reported on 3/21/2023)       No current facility-administered medications for this visit.       Review of Symptoms:    Review of Systems   Constitutional: Negative for activity change and fatigue.   HENT: Negative.    Eyes: Negative.    Respiratory: Negative.    Cardiovascular: Negative.    Gastrointestinal: Negative.    Endocrine: Negative.    Genitourinary: Negative.    Musculoskeletal: Negative.    Allergic/Immunologic: Negative.    Neurological: Negative.    Hematological: Negative.    Psychiatric/Behavioral: The patient is nervous/anxious.          Physical Exam:   Blood pressure 108/81, pulse 105, height 160 cm (63\"), weight 98.3 kg (216 lb 12.8 oz), not currently breastfeeding.      Appearance: Overweight CF of stated age, NAD   Gait, Station, Strength: WNL    Mental Status Exam:   Hygiene:   good  Cooperation:  Cooperative  Eye Contact:  Good  Psychomotor Behavior:  Appropriate  Affect:  Full range   Mood: normal, mild anxiety  Hopelessness: Denies  Speech:  Normal  Thought Process:  Goal directed and Linear  Thought Content:  Bizarre and Mood congruent  Suicidal:  None  Homicidal:  None  Hallucinations:  None but possible tactile " hallucinations vs anxiety   Delusion:  Paranoid intermittently  Memory:  Intact  Orientation:  Person, Place, Time and Situation  Reliability:  fair  Insight:  Poor but improving  Judgement:  Fair  Impulse Control:  Good  Physical/Medical Issues:  No        Lab Results:   No visits with results within 1 Month(s) from this visit.   Latest known visit with results is:   Lab on 06/09/2022   Component Date Value Ref Range Status   • Glucose 06/09/2022 89  65 - 99 mg/dL Final   • BUN 06/09/2022 5 (L)  6 - 20 mg/dL Final   • Creatinine 06/09/2022 0.70  0.57 - 1.00 mg/dL Final   • Sodium 06/09/2022 137  136 - 145 mmol/L Final   • Potassium 06/09/2022 3.6  3.5 - 5.2 mmol/L Final   • Chloride 06/09/2022 103  98 - 107 mmol/L Final   • CO2 06/09/2022 25.0  22.0 - 29.0 mmol/L Final   • Calcium 06/09/2022 8.4 (L)  8.6 - 10.5 mg/dL Final   • Total Protein 06/09/2022 6.0  6.0 - 8.5 g/dL Final   • Albumin 06/09/2022 4.00  3.50 - 5.20 g/dL Final   • ALT (SGPT) 06/09/2022 64 (H)  1 - 33 U/L Final   • AST (SGOT) 06/09/2022 56 (H)  1 - 32 U/L Final   • Alkaline Phosphatase 06/09/2022 88  39 - 117 U/L Final   • Total Bilirubin 06/09/2022 <0.2  0.0 - 1.2 mg/dL Final   • Globulin 06/09/2022 2.0  gm/dL Final   • A/G Ratio 06/09/2022 2.0  g/dL Final   • BUN/Creatinine Ratio 06/09/2022 7.1  7.0 - 25.0 Final   • Anion Gap 06/09/2022 9.0  5.0 - 15.0 mmol/L Final   • eGFR 06/09/2022 108.2  >60.0 mL/min/1.73 Final    National Kidney Foundation and American Society of Nephrology (ASN) Task Force recommended calculation based on the Chronic Kidney Disease Epidemiology Collaboration (CKD-EPI) equation refit without adjustment for race.   • Total Cholesterol 06/09/2022 93  0 - 200 mg/dL Final   • Triglycerides 06/09/2022 59  0 - 150 mg/dL Final   • HDL Cholesterol 06/09/2022 46  40 - 60 mg/dL Final   • LDL Cholesterol  06/09/2022 33  0 - 100 mg/dL Final   • VLDL Cholesterol 06/09/2022 14  5 - 40 mg/dL Final   • LDL/HDL Ratio 06/09/2022 0.77   Final    • Hemoglobin A1C 06/09/2022 5.40  4.80 - 5.60 % Final   • TSH 06/09/2022 3.380  0.270 - 4.200 uIU/mL Final   • 25 Hydroxy, Vitamin D 06/09/2022 35.9  30.0 - 100.0 ng/ml Final   • Vitamin B-12 06/09/2022 >2,000 (H)  211 - 946 pg/mL Final   • WBC 06/09/2022 4.04  3.40 - 10.80 10*3/mm3 Final   • RBC 06/09/2022 3.54 (L)  3.77 - 5.28 10*6/mm3 Final   • Hemoglobin 06/09/2022 7.6 (L)  12.0 - 15.9 g/dL Final   • Hematocrit 06/09/2022 25.9 (L)  34.0 - 46.6 % Final   • MCV 06/09/2022 73.2 (L)  79.0 - 97.0 fL Final   • MCH 06/09/2022 21.5 (L)  26.6 - 33.0 pg Final   • MCHC 06/09/2022 29.3 (L)  31.5 - 35.7 g/dL Final   • RDW 06/09/2022 15.9 (H)  12.3 - 15.4 % Final   • RDW-SD 06/09/2022 41.1  37.0 - 54.0 fl Final   • MPV 06/09/2022 11.3  6.0 - 12.0 fL Final   • Platelets 06/09/2022 252  140 - 450 10*3/mm3 Final   • Neutrophil % 06/09/2022 77.9 (H)  42.7 - 76.0 % Final   • Lymphocyte % 06/09/2022 17.9 (L)  19.6 - 45.3 % Final   • Monocyte % 06/09/2022 4.2 (L)  5.0 - 12.0 % Final   • Neutrophils Absolute 06/09/2022 3.15  1.70 - 7.00 10*3/mm3 Final   • Lymphocytes Absolute 06/09/2022 0.72  0.70 - 3.10 10*3/mm3 Final   • Monocytes Absolute 06/09/2022 0.17  0.10 - 0.90 10*3/mm3 Final   • Anisocytosis 06/09/2022 Mod/2+  None Seen Final   • Microcytes 06/09/2022 Slight/1+  None Seen Final   • WBC Morphology 06/09/2022 Normal  Normal Final   • Platelet Morphology 06/09/2022 Normal  Normal Final       Assessment & Plan   Diagnoses and all orders for this visit:    1. Paranoid schizophrenia (HCC) (Primary)  -     risperiDONE (risperDAL) 3 MG tablet; Take 1 tablet by mouth Every Night. Indications: Schizophrenia  Dispense: 30 tablet; Refill: 2  -     paliperidone palmitate (Invega Sustenna) 156 MG/ML suspension prefilled syringe IM injection; Inject 1 mL into the appropriate muscle as directed by prescriber Every 30 (Thirty) Days.  Dispense: 1 mL; Refill: 2    2. Recurrent major depressive disorder, in full remission (HCC)  -      escitalopram (LEXAPRO) 20 MG tablet; Take 1 tablet by mouth Daily. Indications: Major Depressive Disorder  Dispense: 30 tablet; Refill: 2  -     risperiDONE (risperDAL) 3 MG tablet; Take 1 tablet by mouth Every Night. Indications: Schizophrenia  Dispense: 30 tablet; Refill: 2  -     paliperidone palmitate (Invega Sustenna) 156 MG/ML suspension prefilled syringe IM injection; Inject 1 mL into the appropriate muscle as directed by prescriber Every 30 (Thirty) Days.  Dispense: 1 mL; Refill: 2    -Patient relatively stable and doing well with all medications being stopped aside from her long-acting injectable but she is having some tactile hallucinations which may be more related to situational or social anxiety.  We will try to restart some medications that may benefit both while trying to stick with reduced polypharmacy.  -Reviewed previous available documentation  -Reviewed most recent available labs    -Continue Risperdal Consta 25 mg every 2 weeks for schizophrenia  -Restart Risperdal at 1 mg p.o. nightly for schizophrenia and mood  -Restart Lexapro 20 mg p.o. daily for dysphoria and fatigue  -Continue Wellbutrin  -Encourage continued cessation of methamphetamine.  Patient has not had any relapses since last visit      Visit Diagnoses:    ICD-10-CM ICD-9-CM   1. Paranoid schizophrenia (HCC)  F20.0 295.30   2. Recurrent major depressive disorder, in full remission (HCC)  F33.42 296.36       TREATMENT PLAN/GOALS: Continue supportive psychotherapy efforts and medications as indicated. Treatment and medication options discussed during today's visit. Patient acknowledged and verbally consented to continue with current treatment plan and was educated on the importance of compliance with treatment and follow-up appointments.    MEDICATION ISSUES:    Discussed medication options and treatment plan of prescribed medication as well as the risks, benefits, and side effects including potential falls, possible impaired driving  and metabolic adversities among others. Patient is agreeable to call the office with any worsening of symptoms or onset of side effects. Patient is agreeable to call 911 or go to the nearest ER should he/she begin having SI/HI.     MEDS ORDERED DURING VISIT:  New Medications Ordered This Visit   Medications   • escitalopram (LEXAPRO) 20 MG tablet     Sig: Take 1 tablet by mouth Daily. Indications: Major Depressive Disorder     Dispense:  30 tablet     Refill:  2   • paliperidone palmitate (Invega Sustenna) 156 MG/ML suspension prefilled syringe IM injection     Sig: Inject 1 mL into the appropriate muscle as directed by prescriber Every 30 (Thirty) Days.     Dispense:  1 mL     Refill:  2   • risperiDONE (risperDAL) 1 MG tablet     Sig: Take 1 tablet by mouth Every Night. Indications: Schizophrenia     Dispense:  30 tablet     Refill:  2     Please cancel the other risperdal script for 3mg nightly, sent in error.       Return in about 3 months (around 6/21/2023).             This document has been electronically signed by Naldo Murphy MD  March 21, 2023 15:22 EDT

## 2023-06-12 ENCOUNTER — TRANSCRIBE ORDERS (OUTPATIENT)
Dept: ADMINISTRATIVE | Facility: HOSPITAL | Age: 48
End: 2023-06-12
Payer: MEDICARE

## 2023-06-12 ENCOUNTER — HOSPITAL ENCOUNTER (OUTPATIENT)
Dept: GENERAL RADIOLOGY | Facility: HOSPITAL | Age: 48
Discharge: HOME | End: 2023-06-12
Admitting: NURSE PRACTITIONER
Payer: MEDICARE

## 2023-06-12 DIAGNOSIS — R10.9 STOMACH ACHE: ICD-10-CM

## 2023-06-12 DIAGNOSIS — R10.9 STOMACH ACHE: Primary | ICD-10-CM

## 2023-06-12 PROCEDURE — 74018 RADEX ABDOMEN 1 VIEW: CPT

## 2023-06-12 PROCEDURE — 74018 RADEX ABDOMEN 1 VIEW: CPT | Performed by: RADIOLOGY

## 2023-06-13 DIAGNOSIS — F33.42 RECURRENT MAJOR DEPRESSIVE DISORDER, IN FULL REMISSION: ICD-10-CM

## 2023-06-13 DIAGNOSIS — F20.0 PARANOID SCHIZOPHRENIA: ICD-10-CM

## 2023-06-14 NOTE — TELEPHONE ENCOUNTER
I left voicemail for patient to call office at earliest convenience to schedule follow up.    Last appt. 3/21/23. No Show 6/13/23. No f/up scheduled.

## 2023-06-15 RX ORDER — PALIPERIDONE PALMITATE 156 MG/ML
INJECTION INTRAMUSCULAR
Qty: 1 ML | Refills: 2 | Status: SHIPPED | OUTPATIENT
Start: 2023-06-15

## 2023-08-02 ENCOUNTER — HOSPITAL ENCOUNTER (OUTPATIENT)
Dept: ULTRASOUND IMAGING | Facility: HOSPITAL | Age: 48
Discharge: HOME | End: 2023-08-02
Payer: MEDICARE

## 2023-08-02 ENCOUNTER — HOSPITAL ENCOUNTER (OUTPATIENT)
Dept: MAMMOGRAPHY | Facility: HOSPITAL | Age: 48
Discharge: HOME | End: 2023-08-02
Payer: MEDICARE

## 2023-08-02 DIAGNOSIS — R92.8 ABNORMAL MAMMOGRAM: ICD-10-CM

## 2023-08-02 PROCEDURE — G0279 TOMOSYNTHESIS, MAMMO: HCPCS | Performed by: RADIOLOGY

## 2023-08-02 PROCEDURE — 77065 DX MAMMO INCL CAD UNI: CPT

## 2023-08-02 PROCEDURE — G0279 TOMOSYNTHESIS, MAMMO: HCPCS

## 2023-08-02 PROCEDURE — 76642 ULTRASOUND BREAST LIMITED: CPT | Performed by: RADIOLOGY

## 2023-08-02 PROCEDURE — 76642 ULTRASOUND BREAST LIMITED: CPT

## 2023-08-02 PROCEDURE — 77065 DX MAMMO INCL CAD UNI: CPT | Performed by: RADIOLOGY

## 2023-09-12 DIAGNOSIS — F20.0 PARANOID SCHIZOPHRENIA: ICD-10-CM

## 2023-09-12 DIAGNOSIS — F33.42 RECURRENT MAJOR DEPRESSIVE DISORDER, IN FULL REMISSION: ICD-10-CM

## 2023-09-12 RX ORDER — PALIPERIDONE PALMITATE 156 MG/ML
INJECTION INTRAMUSCULAR
Qty: 1 ML | Refills: 2 | Status: SHIPPED | OUTPATIENT
Start: 2023-09-12

## 2023-10-04 ENCOUNTER — CONSULT (OUTPATIENT)
Dept: ONCOLOGY | Facility: CLINIC | Age: 48
End: 2023-10-04
Payer: MEDICARE

## 2023-10-04 ENCOUNTER — LAB (OUTPATIENT)
Dept: ONCOLOGY | Facility: CLINIC | Age: 48
End: 2023-10-04
Payer: MEDICARE

## 2023-10-04 VITALS
DIASTOLIC BLOOD PRESSURE: 90 MMHG | SYSTOLIC BLOOD PRESSURE: 152 MMHG | TEMPERATURE: 97.3 F | OXYGEN SATURATION: 97 % | RESPIRATION RATE: 18 BRPM | HEART RATE: 99 BPM | HEIGHT: 63 IN | WEIGHT: 220.2 LBS | BODY MASS INDEX: 39.02 KG/M2

## 2023-10-04 DIAGNOSIS — D50.9 MICROCYTIC ANEMIA: Primary | ICD-10-CM

## 2023-10-04 DIAGNOSIS — D50.9 IRON DEFICIENCY ANEMIA, UNSPECIFIED IRON DEFICIENCY ANEMIA TYPE: ICD-10-CM

## 2023-10-04 DIAGNOSIS — D50.9 MICROCYTIC ANEMIA: ICD-10-CM

## 2023-10-04 DIAGNOSIS — R53.83 OTHER FATIGUE: ICD-10-CM

## 2023-10-04 DIAGNOSIS — N93.9 ABNORMAL UTERINE BLEEDING: ICD-10-CM

## 2023-10-04 LAB
ALBUMIN SERPL-MCNC: 4 G/DL (ref 3.5–5.2)
ALBUMIN/GLOB SERPL: 1.5 G/DL
ALP SERPL-CCNC: 90 U/L (ref 39–117)
ALT SERPL W P-5'-P-CCNC: 32 U/L (ref 1–33)
ANION GAP SERPL CALCULATED.3IONS-SCNC: 9.1 MMOL/L (ref 5–15)
AST SERPL-CCNC: 28 U/L (ref 1–32)
BASOPHILS # BLD AUTO: 0.04 10*3/MM3 (ref 0–0.2)
BASOPHILS NFR BLD AUTO: 0.7 % (ref 0–1.5)
BILIRUB SERPL-MCNC: 0.3 MG/DL (ref 0–1.2)
BUN SERPL-MCNC: 8 MG/DL (ref 6–20)
BUN/CREAT SERPL: 11.9 (ref 7–25)
CALCIUM SPEC-SCNC: 9.2 MG/DL (ref 8.6–10.5)
CHLORIDE SERPL-SCNC: 104 MMOL/L (ref 98–107)
CO2 SERPL-SCNC: 25.9 MMOL/L (ref 22–29)
CREAT SERPL-MCNC: 0.67 MG/DL (ref 0.57–1)
CRP SERPL-MCNC: 0.46 MG/DL (ref 0–0.5)
DEPRECATED RDW RBC AUTO: 49.3 FL (ref 37–54)
EGFRCR SERPLBLD CKD-EPI 2021: 108 ML/MIN/1.73
EOSINOPHIL # BLD AUTO: 0 10*3/MM3 (ref 0–0.4)
EOSINOPHIL NFR BLD AUTO: 0 % (ref 0.3–6.2)
ERYTHROCYTE [DISTWIDTH] IN BLOOD BY AUTOMATED COUNT: 15.7 % (ref 12.3–15.4)
ERYTHROCYTE [SEDIMENTATION RATE] IN BLOOD: 17 MM/HR (ref 0–20)
FERRITIN SERPL-MCNC: 14.08 NG/ML (ref 13–150)
GLOBULIN UR ELPH-MCNC: 2.6 GM/DL
GLUCOSE SERPL-MCNC: 91 MG/DL (ref 65–99)
HCT VFR BLD AUTO: 41.8 % (ref 34–46.6)
HGB BLD-MCNC: 13 G/DL (ref 12–15.9)
IMM GRANULOCYTES # BLD AUTO: 0.01 10*3/MM3 (ref 0–0.05)
IMM GRANULOCYTES NFR BLD AUTO: 0.2 % (ref 0–0.5)
IRON 24H UR-MRATE: 42 MCG/DL (ref 37–145)
IRON SATN MFR SERPL: 10 % (ref 20–50)
LDH SERPL-CCNC: 242 U/L (ref 135–214)
LYMPHOCYTES # BLD AUTO: 1.5 10*3/MM3 (ref 0.7–3.1)
LYMPHOCYTES NFR BLD AUTO: 25.7 % (ref 19.6–45.3)
MCH RBC QN AUTO: 27.4 PG (ref 26.6–33)
MCHC RBC AUTO-ENTMCNC: 31.1 G/DL (ref 31.5–35.7)
MCV RBC AUTO: 88.2 FL (ref 79–97)
MONOCYTES # BLD AUTO: 0.39 10*3/MM3 (ref 0.1–0.9)
MONOCYTES NFR BLD AUTO: 6.7 % (ref 5–12)
NEUTROPHILS NFR BLD AUTO: 3.9 10*3/MM3 (ref 1.7–7)
NEUTROPHILS NFR BLD AUTO: 66.7 % (ref 42.7–76)
NRBC BLD AUTO-RTO: 0 /100 WBC (ref 0–0.2)
PLATELET # BLD AUTO: 195 10*3/MM3 (ref 140–450)
PMV BLD AUTO: 10.7 FL (ref 6–12)
POTASSIUM SERPL-SCNC: 4.1 MMOL/L (ref 3.5–5.2)
PROT SERPL-MCNC: 6.6 G/DL (ref 6–8.5)
RBC # BLD AUTO: 4.74 10*6/MM3 (ref 3.77–5.28)
SODIUM SERPL-SCNC: 139 MMOL/L (ref 136–145)
TIBC SERPL-MCNC: 401 MCG/DL (ref 298–536)
TRANSFERRIN SERPL-MCNC: 269 MG/DL (ref 200–360)
TSH SERPL DL<=0.05 MIU/L-ACNC: 2.54 UIU/ML (ref 0.27–4.2)
WBC NRBC COR # BLD: 5.84 10*3/MM3 (ref 3.4–10.8)

## 2023-10-04 PROCEDURE — 82525 ASSAY OF COPPER: CPT | Performed by: NURSE PRACTITIONER

## 2023-10-04 PROCEDURE — 82607 VITAMIN B-12: CPT | Performed by: NURSE PRACTITIONER

## 2023-10-04 PROCEDURE — 86364 TISS TRNSGLTMNASE EA IG CLAS: CPT | Performed by: NURSE PRACTITIONER

## 2023-10-04 PROCEDURE — 82746 ASSAY OF FOLIC ACID SERUM: CPT | Performed by: NURSE PRACTITIONER

## 2023-10-04 PROCEDURE — 80053 COMPREHEN METABOLIC PANEL: CPT | Performed by: NURSE PRACTITIONER

## 2023-10-04 PROCEDURE — 82668 ASSAY OF ERYTHROPOIETIN: CPT | Performed by: NURSE PRACTITIONER

## 2023-10-04 PROCEDURE — 82728 ASSAY OF FERRITIN: CPT | Performed by: NURSE PRACTITIONER

## 2023-10-04 PROCEDURE — 84466 ASSAY OF TRANSFERRIN: CPT | Performed by: NURSE PRACTITIONER

## 2023-10-04 PROCEDURE — 83540 ASSAY OF IRON: CPT | Performed by: NURSE PRACTITIONER

## 2023-10-04 PROCEDURE — 83615 LACTATE (LD) (LDH) ENZYME: CPT | Performed by: NURSE PRACTITIONER

## 2023-10-04 PROCEDURE — 86140 C-REACTIVE PROTEIN: CPT | Performed by: NURSE PRACTITIONER

## 2023-10-04 PROCEDURE — 85652 RBC SED RATE AUTOMATED: CPT | Performed by: NURSE PRACTITIONER

## 2023-10-04 PROCEDURE — 83010 ASSAY OF HAPTOGLOBIN QUANT: CPT | Performed by: NURSE PRACTITIONER

## 2023-10-04 PROCEDURE — 84443 ASSAY THYROID STIM HORMONE: CPT | Performed by: NURSE PRACTITIONER

## 2023-10-04 PROCEDURE — 84630 ASSAY OF ZINC: CPT | Performed by: NURSE PRACTITIONER

## 2023-10-04 PROCEDURE — 85025 COMPLETE CBC W/AUTO DIFF WBC: CPT | Performed by: NURSE PRACTITIONER

## 2023-10-04 RX ORDER — FERROUS SULFATE 325(65) MG
325 TABLET ORAL
Qty: 30 TABLET | Refills: 2 | Status: SHIPPED | OUTPATIENT
Start: 2023-10-04

## 2023-10-04 NOTE — PROGRESS NOTES
"DATE OF CONSULTATION:  10/4/2023    REASON FOR REFERRAL: Anemia, abnormal RBC morphology    REFERRING PHYSICIAN:  RICARDO Garcia    CHIEF COMPLAINT:  Fatigue    HISTORY OF PRESENT ILLNESS:   Jazzy Valle is a very pleasant 48 y.o. female who is being seen today at the request of RICARDO Garcia for evaluation and treatment of anemia, abnormal RBC morphology. Ms. Valle reports that she follows with her PCP on an as-needed basis.  She states that she does not routinely have lab testing done.  Her last set of labs were drawn in May 2023 which revealed hemoglobin of 7.5.  She was started on oral iron supplementation but states that she frequently forgets to take this.  She was then referred here for further evaluation.  She denies obvious blood loss from any source.  She states that she has been having \"irregular \"periods for some time now.  She states that sometimes she has spotting and other times she has \"gushes of blood with clots\".  She has not followed with GYN in some time.  She denies prior history of blood transfusion.  She denies chest pain, shortness of breath, weakness.  She has not had EGD or colonoscopy.  She does endorse fatigue and intermittent palpitations.  She is otherwise without specific complaints.    PAST MEDICAL HISTORY:  Past Medical History:   Diagnosis Date    Anxiety     Bipolar disorder     Depression     Hypertension     PCO (polycystic ovaries)     Psychosis     Schizoaffective disorder     Substance abuse        PAST SURGICAL HISTORY:  Past Surgical History:   Procedure Laterality Date    BACK SURGERY  2008    BREAST EXCISIONAL BIOPSY Right     benign    CERVICAL SPINE SURGERY  2008    KNEE SURGERY Right 2014       FAMILY HISTORY:  Family History   Problem Relation Age of Onset    Breast cancer Neg Hx        SOCIAL HISTORY:  Social History     Socioeconomic History    Marital status: Single   Tobacco Use    Smoking status: Every Day     Packs/day: 1.00     Years: 20.00     Pack " "years: 20.00     Types: Cigarettes    Smokeless tobacco: Never   Vaping Use    Vaping Use: Former    Substances: Nicotine, Flavoring    Devices: Disposable   Substance and Sexual Activity    Alcohol use: No    Drug use: Yes     Types: Methamphetamines    Sexual activity: Defer       MEDICATIONS:  The current medication list was reviewed in the EMR    Current Outpatient Medications:     atenolol (TENORMIN) 50 MG tablet, Take 1 tablet by mouth Daily. for blood pressure, Disp: , Rfl:     atenolol (TENORMIN) 50 MG tablet, Take 1 tablet by mouth Daily., Disp: 30 tablet, Rfl: 0    escitalopram (LEXAPRO) 20 MG tablet, Take 1 tablet by mouth Daily. Indications: Major Depressive Disorder, Disp: 30 tablet, Rfl: 2    Invega Sustenna 156 MG/ML suspension prefilled syringe IM injection, INJECT 1ML INTRAMUSCULARLY AS DIRECTED, Disp: 1 mL, Rfl: 2    risperiDONE (risperDAL) 1 MG tablet, Take 1 tablet by mouth Every Night. Indications: Schizophrenia, Disp: 30 tablet, Rfl: 2    ALLERGIES:    Allergies   Allergen Reactions    Abilify [Aripiprazole] Mental Status Change    Cymbalta [Duloxetine Hcl] Other (See Comments)     Hot all over.         REVIEW OF SYSTEMS:    A comprehensive 14 point review of systems was performed.  Significant findings as mentioned above.  All other systems reviewed and are negative.        Physical Exam   Vital Signs: /90   Pulse 99   Temp 97.3 øF (36.3 øC) (Temporal)   Resp 18   Ht 160 cm (63\")   Wt 99.9 kg (220 lb 3.2 oz)   SpO2 97%   BMI 39.01 kg/mý         ECOG score: 0     General/Constitutional: Awake, alert and oriented. No apparent acute distress is noted.  HEENT: Normocephalic, atraumatic. PERRLA, conjunctiva normal. Extraocular movements are intact.  Neck: No JVD, thyromegaly or cervical lymphadenopathy.  Cardiovascular: S1, S2. Regular rate and rhythm, no murmurs, rubs or gallops.  Respiratory: Pulmonary effort is normal, lungs are clear to auscultation bilaterally. No wheezing, " rhonchi or rales. No use of accessory muscles, no retractions.  Abdomen: Soft, non-tender, non-distended with normoactive bowel sounds x 4 quadrants. No palpable hepatosplenomegaly.  Lymph: No cervical, supraclavicular, axillary, inguinal or femoral adenopathy.  Integumentary: Skin warm and dry. No bruising, ecchymosis.  Extremities: No clubbing, cyanosis or edema.  Neurological: Alert and oriented x 3. Grossly non-focal examination.    Pain Score:  Pain Score    10/04/23 1058   PainSc: 0-No pain       PHQ-Score Total:  PHQ-9 Total Score: 0       PATHOLOGY:        ENDOSCOPY:        IMAGING:  US Abdomen Complete    Result Date: 6/28/2023  1. 3.4 x 4.1 x 1.5 cm fluid collection with luminal soft tissue most consistent with herniated FAT and is most consistent with umbilical hernia containing fat and fluid. Further characterization with CT is recommended. Correlate with physical exam to determine if the hernia is incarcerated. 2. Otherwise unremarkable abdominal ultrasound.  This report was finalized on 6/28/2023 8:27 AM by Dr. Storm Victoria MD.      Mammo Diagnostic Digital Tomosynthesis Left With CAD    Result Date: 8/2/2023  Probably benign left breast imaging findings.   BI-RADS CATEGORY:  3, PROBABLY BENIGN   RECOMMENDATION:  The patient will be due for her next bilateral diagnostic mammogram after 11/18/2023.  CAD was utilized.  The standard false-negative rate of mammography is between 10% and 25%. Complex patterns or increased breast density will markedly elevate the false-negative rate of mammography.    The patient was notified of the results and recommendations at the time of her visit.  Additionally, a letter, in lay terminology, with the results of this exam will be mailed to the patient.  This report was finalized on 8/2/2023 1:23 PM by Dr. Daysi Montenegro MD.      XR Abdomen KUB    Result Date: 6/12/2023    Nonspecific but nonobstructive bowel gas pattern.  This report was finalized on 6/12/2023 1:08  PM by Dr. Storm Victoria MD.      US Breast Left Limited    Result Date: 8/2/2023  Probably benign left breast imaging findings.   BI-RADS CATEGORY:  3, PROBABLY BENIGN   RECOMMENDATION:  The patient will be due for her next bilateral diagnostic mammogram after 11/18/2023.  CAD was utilized.  The standard false-negative rate of mammography is between 10% and 25%. Complex patterns or increased breast density will markedly elevate the false-negative rate of mammography.    The patient was notified of the results and recommendations at the time of her visit.  Additionally, a letter, in lay terminology, with the results of this exam will be mailed to the patient.  This report was finalized on 8/2/2023 1:23 PM by Dr. Daysi Montenegro MD.         RECENT LABS:      05/01/2023 05/01/2023  RBC morphology: 2+ hypochromia, 1+ microcytosis, 1+ anisocytosis, slight polychromasia, basophilic stippling present    Lab Results   Component Value Date    WBC 4.04 06/09/2022    HGB 7.6 (L) 06/09/2022    HCT 25.9 (L) 06/09/2022    MCV 73.2 (L) 06/09/2022    RDW 15.9 (H) 06/09/2022     06/09/2022    NEUTRORELPCT 72.4 09/22/2021    LYMPHORELPCT 17.5 (L) 09/22/2021    MONORELPCT 9.2 09/22/2021    EOSRELPCT 0.0 (L) 09/22/2021    BASORELPCT 0.6 09/22/2021    NEUTROABS 3.15 06/09/2022    LYMPHSABS 1.22 09/22/2021     Lab Results   Component Value Date     06/09/2022    K 3.6 06/09/2022    CO2 25.0 06/09/2022     06/09/2022    BUN 5 (L) 06/09/2022    CREATININE 0.70 06/09/2022    EGFRIFNONA 81 09/22/2021    GLUCOSE 89 06/09/2022    CALCIUM 8.4 (L) 06/09/2022    ALKPHOS 88 06/09/2022    AST 56 (H) 06/09/2022    ALT 64 (H) 06/09/2022    BILITOT <0.2 06/09/2022    ALBUMIN 4.00 06/09/2022    PROTEINTOT 6.0 06/09/2022    MG 1.8 09/22/2021     Lab Results   Component Value Date    IRON 18 (L) 10/03/2019    TIBC 457 10/03/2019    LABIRON 4 (L) 10/03/2019    [id number] >2,000 (H) 06/09/2022         ASSESSMENT & PLAN:  Jazzy HUDSON  "Leonard is a very pleasant 48 y.o. female with    1. Microcytic anemia  2. Iron deficiency anemia  - Previous available CBC's were reviewed and patient has had ongoing microcytic anemia for years with Hg 7.5-10.9. She does not follow with her PCP on a routine basis and does not have routine lab testing done. Most recent labs from 05/01/23 show Hg 7.5.   - She denies obvious blood loss from any source.   - She has never required blood transfusion in the past.  - She states that when last labs were drawn at her PCP in May, she was started on oral iron supplementation (she thinks Ferrous Sulfate 325 mg daily) but states that she frequently forgets to take this medication.  - She has never had EGD/colonoscopy. Discussed referral to GI/general surgery for this today and she states that she would like to think about it. Will re-discuss at next appointment.  - She reports that she has been having abnormal uterine bleeding that is irregular and sometimes has spotting followed by \"gushes of blood with clots\" for some time now. She is not up to date on her pap smear and does not routinely follow with GYN. Recommended referral and is agreeable. Order was placed today.  - Aside from fatigue, she is not symptomatic.   - Additional testing to further evaluate anemia including vitamin B12, folate, ESR, CRP, copper, zinc, TSH, haptoglobin, erythropoietin, LDH, PBS and tissue transglutaminase are currently pending.  - CBC today shows WBC 5.84, Hg 13.0, Hct 41.8 and platelets 195,000.  - Iron profile shows low normal iron (42) with low iron saturation (10%) and normal TIBC (401). Ferritin is low.  - New rx for Ferrous Sulfate 325 mg daily sent to pharmacy. She is advised of importance of compliance and verbalized understanding.  - Follow up in 4 weeks with repeat CBCD, iron profile and ferritin.    3. Abnormal uterine bleeding  - She reports that she has been having abnormal uterine bleeding that is irregular and sometimes has " "spotting followed by \"gushes of blood with clots\" for some time now. She is not up to date on her pap smear and does not routinely follow with GYN. Recommended referral and is agreeable. Order was placed today.      ACO / JESSE/Other  Quality measures  -  Jazzy Valle did not receive 2023 flu vaccine.  This was recommended.  -  Jazzy Valle reports a pain score of 0.    -  Current outpatient and discharge medications have been reconciled for the patient.  Reviewed by: RICARDO Munguia         The patient was in agreement with the plan and all questions were answered to her satisfaction.     Thank you so much for allowing us to participate in the care of Jazzy Valle . Please do not hesitate to contact us with any questions or concerns.     A total of 45 minutes were spent coordinating this patient's care in clinic today; more than 50% of this time was face-to-face with the patient, reviewing her interim medical history and counseling on the current treatment and followup plan. All questions were answered to her satisfaction.            Electronically Signed by: RICARDO Mckeon , October 4, 2023 11:26 EDT       CC:   RICARDO Garcia Donna, APRN         "

## 2023-10-04 NOTE — PROGRESS NOTES
Venipuncture Blood Specimen Collection  Venipuncture performed in right arm by Kelsi Ibrahim MA with good hemostasis. Patient tolerated the procedure well without complications.   10/04/23   Kelsi Ibrahim MA

## 2023-10-05 LAB
EPO SERPL-ACNC: 20 MIU/ML (ref 2.6–18.5)
FOLATE SERPL-MCNC: 12.3 NG/ML (ref 4.78–24.2)
HAPTOGLOB SERPL-MCNC: 194 MG/DL (ref 30–200)
REF LAB TEST METHOD: NORMAL
TTG IGA SER-ACNC: <2 U/ML (ref 0–3)
VIT B12 BLD-MCNC: 384 PG/ML (ref 211–946)

## 2023-10-12 LAB
COPPER SERPL-MCNC: 134 UG/DL (ref 80–158)
ZINC SERPL-MCNC: 67 UG/DL (ref 44–115)

## 2023-12-19 ENCOUNTER — HOSPITAL ENCOUNTER (EMERGENCY)
Facility: HOSPITAL | Age: 48
Discharge: HOME | End: 2023-12-19
Attending: EMERGENCY MEDICINE | Admitting: EMERGENCY MEDICINE
Payer: MEDICARE

## 2023-12-19 ENCOUNTER — APPOINTMENT (OUTPATIENT)
Dept: GENERAL RADIOLOGY | Facility: HOSPITAL | Age: 48
End: 2023-12-19
Payer: MEDICARE

## 2023-12-19 VITALS
HEART RATE: 90 BPM | HEIGHT: 63 IN | DIASTOLIC BLOOD PRESSURE: 101 MMHG | WEIGHT: 225 LBS | OXYGEN SATURATION: 93 % | SYSTOLIC BLOOD PRESSURE: 146 MMHG | RESPIRATION RATE: 18 BRPM | BODY MASS INDEX: 39.87 KG/M2 | TEMPERATURE: 97.6 F

## 2023-12-19 DIAGNOSIS — R00.0 SINUS TACHYCARDIA: Primary | ICD-10-CM

## 2023-12-19 DIAGNOSIS — J44.9 COPD MIXED TYPE: ICD-10-CM

## 2023-12-19 LAB
A-A DO2: 34 MMHG (ref 0–300)
ALBUMIN SERPL-MCNC: 4 G/DL (ref 3.5–5.2)
ALBUMIN/GLOB SERPL: 1.5 G/DL
ALP SERPL-CCNC: 103 U/L (ref 39–117)
ALT SERPL W P-5'-P-CCNC: 31 U/L (ref 1–33)
ANION GAP SERPL CALCULATED.3IONS-SCNC: 8.2 MMOL/L (ref 5–15)
ARTERIAL PATENCY WRIST A: ABNORMAL
AST SERPL-CCNC: 31 U/L (ref 1–32)
ATMOSPHERIC PRESS: 734 MMHG
BASE EXCESS BLDA CALC-SCNC: 1.9 MMOL/L (ref 0–2)
BASOPHILS # BLD AUTO: 0.04 10*3/MM3 (ref 0–0.2)
BASOPHILS NFR BLD AUTO: 0.4 % (ref 0–1.5)
BDY SITE: ABNORMAL
BILIRUB SERPL-MCNC: 0.2 MG/DL (ref 0–1.2)
BUN SERPL-MCNC: 6 MG/DL (ref 6–20)
BUN/CREAT SERPL: 7.6 (ref 7–25)
CALCIUM SPEC-SCNC: 9.1 MG/DL (ref 8.6–10.5)
CHLORIDE SERPL-SCNC: 101 MMOL/L (ref 98–107)
CO2 BLDA-SCNC: 28.3 MMOL/L (ref 22–33)
CO2 SERPL-SCNC: 26.8 MMOL/L (ref 22–29)
COHGB MFR BLD: 2.6 % (ref 0–5)
CREAT SERPL-MCNC: 0.79 MG/DL (ref 0.57–1)
CRP SERPL-MCNC: 1.01 MG/DL (ref 0–0.5)
D-LACTATE SERPL-SCNC: 1.4 MMOL/L (ref 0.5–2)
DEPRECATED RDW RBC AUTO: 44.5 FL (ref 37–54)
EGFRCR SERPLBLD CKD-EPI 2021: 92.4 ML/MIN/1.73
EOSINOPHIL # BLD AUTO: 0 10*3/MM3 (ref 0–0.4)
EOSINOPHIL NFR BLD AUTO: 0 % (ref 0.3–6.2)
ERYTHROCYTE [DISTWIDTH] IN BLOOD BY AUTOMATED COUNT: 13.6 % (ref 12.3–15.4)
ERYTHROCYTE [SEDIMENTATION RATE] IN BLOOD: 21 MM/HR (ref 0–20)
FLUAV RNA RESP QL NAA+PROBE: NOT DETECTED
FLUBV RNA RESP QL NAA+PROBE: NOT DETECTED
GLOBULIN UR ELPH-MCNC: 2.7 GM/DL
GLUCOSE SERPL-MCNC: 148 MG/DL (ref 65–99)
HCO3 BLDA-SCNC: 27 MMOL/L (ref 20–26)
HCT VFR BLD AUTO: 34.8 % (ref 34–46.6)
HCT VFR BLD CALC: 33.5 % (ref 38–51)
HGB BLD-MCNC: 10.8 G/DL (ref 12–15.9)
HGB BLDA-MCNC: 10.9 G/DL (ref 13.5–17.5)
HOLD SPECIMEN: NORMAL
HOLD SPECIMEN: NORMAL
IMM GRANULOCYTES # BLD AUTO: 0.06 10*3/MM3 (ref 0–0.05)
IMM GRANULOCYTES NFR BLD AUTO: 0.6 % (ref 0–0.5)
INHALED O2 CONCENTRATION: 21 %
LYMPHOCYTES # BLD AUTO: 0.61 10*3/MM3 (ref 0.7–3.1)
LYMPHOCYTES NFR BLD AUTO: 6.5 % (ref 19.6–45.3)
Lab: ABNORMAL
MCH RBC QN AUTO: 27.8 PG (ref 26.6–33)
MCHC RBC AUTO-ENTMCNC: 31 G/DL (ref 31.5–35.7)
MCV RBC AUTO: 89.7 FL (ref 79–97)
METHGB BLD QL: 0.7 % (ref 0–3)
MODALITY: ABNORMAL
MONOCYTES # BLD AUTO: 0.6 10*3/MM3 (ref 0.1–0.9)
MONOCYTES NFR BLD AUTO: 6.4 % (ref 5–12)
NEUTROPHILS NFR BLD AUTO: 8.13 10*3/MM3 (ref 1.7–7)
NEUTROPHILS NFR BLD AUTO: 86.1 % (ref 42.7–76)
NRBC BLD AUTO-RTO: 0 /100 WBC (ref 0–0.2)
NT-PROBNP SERPL-MCNC: 157.3 PG/ML (ref 0–450)
OXYHGB MFR BLDV: 89 % (ref 94–99)
PCO2 BLDA: 43.3 MM HG (ref 35–45)
PCO2 TEMP ADJ BLD: ABNORMAL MM[HG]
PH BLDA: 7.4 PH UNITS (ref 7.35–7.45)
PH, TEMP CORRECTED: ABNORMAL
PLATELET # BLD AUTO: 250 10*3/MM3 (ref 140–450)
PMV BLD AUTO: 10.5 FL (ref 6–12)
PO2 BLDA: 61.5 MM HG (ref 83–108)
PO2 TEMP ADJ BLD: ABNORMAL MM[HG]
POTASSIUM SERPL-SCNC: 3.9 MMOL/L (ref 3.5–5.2)
PROCALCITONIN SERPL-MCNC: 0.03 NG/ML (ref 0–0.25)
PROT SERPL-MCNC: 6.7 G/DL (ref 6–8.5)
RBC # BLD AUTO: 3.88 10*6/MM3 (ref 3.77–5.28)
SAO2 % BLDCOA: 92 % (ref 94–99)
SARS-COV-2 RNA RESP QL NAA+PROBE: NOT DETECTED
SODIUM SERPL-SCNC: 136 MMOL/L (ref 136–145)
TROPONIN T SERPL HS-MCNC: 9 NG/L
VENTILATOR MODE: ABNORMAL
WBC NRBC COR # BLD AUTO: 9.44 10*3/MM3 (ref 3.4–10.8)
WHOLE BLOOD HOLD COAG: NORMAL
WHOLE BLOOD HOLD SPECIMEN: NORMAL

## 2023-12-19 PROCEDURE — 83880 ASSAY OF NATRIURETIC PEPTIDE: CPT | Performed by: EMERGENCY MEDICINE

## 2023-12-19 PROCEDURE — 94799 UNLISTED PULMONARY SVC/PX: CPT

## 2023-12-19 PROCEDURE — 82375 ASSAY CARBOXYHB QUANT: CPT

## 2023-12-19 PROCEDURE — 99284 EMERGENCY DEPT VISIT MOD MDM: CPT

## 2023-12-19 PROCEDURE — 85025 COMPLETE CBC W/AUTO DIFF WBC: CPT | Performed by: EMERGENCY MEDICINE

## 2023-12-19 PROCEDURE — 82805 BLOOD GASES W/O2 SATURATION: CPT

## 2023-12-19 PROCEDURE — 80053 COMPREHEN METABOLIC PANEL: CPT | Performed by: EMERGENCY MEDICINE

## 2023-12-19 PROCEDURE — 84484 ASSAY OF TROPONIN QUANT: CPT | Performed by: EMERGENCY MEDICINE

## 2023-12-19 PROCEDURE — 84145 PROCALCITONIN (PCT): CPT | Performed by: NURSE PRACTITIONER

## 2023-12-19 PROCEDURE — 85652 RBC SED RATE AUTOMATED: CPT | Performed by: NURSE PRACTITIONER

## 2023-12-19 PROCEDURE — 87040 BLOOD CULTURE FOR BACTERIA: CPT | Performed by: EMERGENCY MEDICINE

## 2023-12-19 PROCEDURE — 93005 ELECTROCARDIOGRAM TRACING: CPT | Performed by: EMERGENCY MEDICINE

## 2023-12-19 PROCEDURE — 94640 AIRWAY INHALATION TREATMENT: CPT

## 2023-12-19 PROCEDURE — 86140 C-REACTIVE PROTEIN: CPT | Performed by: NURSE PRACTITIONER

## 2023-12-19 PROCEDURE — 36415 COLL VENOUS BLD VENIPUNCTURE: CPT

## 2023-12-19 PROCEDURE — 71045 X-RAY EXAM CHEST 1 VIEW: CPT

## 2023-12-19 PROCEDURE — 87636 SARSCOV2 & INF A&B AMP PRB: CPT | Performed by: NURSE PRACTITIONER

## 2023-12-19 PROCEDURE — 83605 ASSAY OF LACTIC ACID: CPT | Performed by: EMERGENCY MEDICINE

## 2023-12-19 PROCEDURE — 83050 HGB METHEMOGLOBIN QUAN: CPT

## 2023-12-19 PROCEDURE — 36600 WITHDRAWAL OF ARTERIAL BLOOD: CPT

## 2023-12-19 RX ORDER — ATENOLOL 50 MG/1
50 TABLET ORAL ONCE
Status: COMPLETED | OUTPATIENT
Start: 2023-12-19 | End: 2023-12-19

## 2023-12-19 RX ORDER — SODIUM CHLORIDE 0.9 % (FLUSH) 0.9 %
10 SYRINGE (ML) INJECTION AS NEEDED
Status: DISCONTINUED | OUTPATIENT
Start: 2023-12-19 | End: 2023-12-19 | Stop reason: HOSPADM

## 2023-12-19 RX ORDER — DOXYCYCLINE 100 MG/1
100 CAPSULE ORAL 2 TIMES DAILY
Qty: 14 CAPSULE | Refills: 0 | Status: SHIPPED | OUTPATIENT
Start: 2023-12-19

## 2023-12-19 RX ORDER — IPRATROPIUM BROMIDE AND ALBUTEROL SULFATE 2.5; .5 MG/3ML; MG/3ML
3 SOLUTION RESPIRATORY (INHALATION) ONCE
Status: COMPLETED | OUTPATIENT
Start: 2023-12-19 | End: 2023-12-19

## 2023-12-19 RX ADMIN — IPRATROPIUM BROMIDE AND ALBUTEROL SULFATE 3 ML: 2.5; .5 SOLUTION RESPIRATORY (INHALATION) at 16:52

## 2023-12-19 RX ADMIN — ATENOLOL 50 MG: 50 TABLET ORAL at 16:36

## 2023-12-19 NOTE — ED NOTES
MEDICAL SCREENING:    Reason for Visit: SOA, Sepsis screen +, tachycardia     Patient initially seen in triage.  The patient was advised further evaluation and diagnostic testing will be needed, some of the treatment and testing will be initiated in the lobby in order to begin the process.  The patient will be returned to the waiting area for the time being and possibly be re-assessed by a subsequent ED provider.  The patient will be brought back to the treatment area in as timely manner as possible.     Rohini Gray, APRN  12/19/23 8106

## 2023-12-19 NOTE — ED PROVIDER NOTES
Subjective   History of Present Illness  Presents to eR with nasal congestion    URI  Presenting symptoms: congestion and cough    Severity:  Mild  Onset quality:  Gradual  Duration:  2 days  Progression:  Worsening  Relieved by:  Nothing  Worsened by:  Nothing  Associated symptoms: wheezing        Review of Systems   Constitutional: Negative.    HENT:  Positive for congestion.    Respiratory:  Positive for cough and wheezing.    Cardiovascular:  Positive for palpitations.   Endocrine: Positive for cold intolerance.   Genitourinary: Negative.    Musculoskeletal: Negative.    Allergic/Immunologic: Negative.    Neurological: Negative.    Hematological: Negative.    Psychiatric/Behavioral: Negative.         Past Medical History:   Diagnosis Date    Anxiety     Bipolar disorder     Depression     Hypertension     PCO (polycystic ovaries)     Psychosis     Schizoaffective disorder     Substance abuse        Allergies   Allergen Reactions    Abilify [Aripiprazole] Mental Status Change    Cymbalta [Duloxetine Hcl] Other (See Comments)     Hot all over.       Past Surgical History:   Procedure Laterality Date    BACK SURGERY  2008    BREAST EXCISIONAL BIOPSY Right     benign    CERVICAL SPINE SURGERY  2008    KNEE SURGERY Right 2014       Family History   Problem Relation Age of Onset    Breast cancer Neg Hx        Social History     Socioeconomic History    Marital status: Single   Tobacco Use    Smoking status: Every Day     Packs/day: 1.00     Years: 20.00     Additional pack years: 0.00     Total pack years: 20.00     Types: Cigarettes    Smokeless tobacco: Never   Vaping Use    Vaping Use: Former    Substances: Nicotine, Flavoring    Devices: Disposable   Substance and Sexual Activity    Alcohol use: No    Drug use: Yes     Types: Methamphetamines    Sexual activity: Defer           Objective   Physical Exam  Vitals and nursing note reviewed.   Constitutional:       Appearance: She is well-developed.   HENT:      Head:  Normocephalic and atraumatic.   Eyes:      Pupils: Pupils are equal, round, and reactive to light.   Cardiovascular:      Rate and Rhythm: Normal rate.   Pulmonary:      Effort: Pulmonary effort is normal.      Breath sounds: Examination of the right-lower field reveals decreased breath sounds. Examination of the left-lower field reveals decreased breath sounds. Decreased breath sounds present.   Abdominal:      Palpations: Abdomen is soft.   Musculoskeletal:         General: Normal range of motion.      Cervical back: Normal range of motion.   Skin:     General: Skin is dry.   Neurological:      General: No focal deficit present.      Mental Status: She is alert.   Psychiatric:         Mood and Affect: Mood normal.         Procedures           ED Course  ED Course as of 12/19/23 1804   Tue Dec 19, 2023   1549 ECG 12 Lead ED Triage Standing Order; SOA  Sinus tachycardia.  Rate 124.  Normal axis.  Normal QT interval.  No acute ischemic changes.  Possible left atrial enlargement.  Borderline EKG.  Interpreted by me.  Electronically signed by Noe Christianson MD, 12/19/23, 3:49 PM EST.   [BC]      ED Course User Index  [BC] Noe Christianson MD                                             Medical Decision Making  Problems Addressed:  COPD mixed type: complicated acute illness or injury  Sinus tachycardia: complicated acute illness or injury    Amount and/or Complexity of Data Reviewed  Labs: ordered.  Radiology: ordered.  ECG/medicine tests: ordered.    Risk  Prescription drug management.        Final diagnoses:   Sinus tachycardia   COPD mixed type       ED Disposition  ED Disposition       ED Disposition   Discharge    Condition   Stable    Comment   --               No follow-up provider specified.       Medication List        New Prescriptions      doxycycline 100 MG capsule  Commonly known as: MONODOX  Take 1 capsule by mouth 2 (Two) Times a Day.               Where to Get Your Medications        These medications were  sent to [place], [place] - [address] - [phone]  - [phone]   [address] [facility] [address]      Phone: [phone]   doxycycline 100 MG capsule            Tonny Alcala MD  12/19/23 1397

## 2023-12-19 NOTE — DISCHARGE INSTRUCTIONS
Call one of the offices below to establish a primary care provider.  If you are unable to get an appointment and feel it is an emergency and need to be seen immediately please return to the Emergency Department.    Call one of the office below to set up a primary care provider.    Dr. James Coles                                                                                                       [address] [facility] [address]  [phone]    Dr. Vaca, Dr. ELY Shea, Dr. SOURAV Shea ([facility])  [address] [facility] [address]  [phone]    Dr. Charles, Dr. Murillo, Dr. Skelton ([facility])  [address] [facility] [address]  [phone]    Dr. Olivier  [address] [facility] [address]  [phone]    Dr. Pelaez, Dr. Flores, Dr. Mcdonnell, Dr. Hughes ([facility])  [address] [place] [address] [address]  [facility] [address]  [phone]    Dr. Tatyana Oviedo  [address] [facility] [place] [address]  [phone]    Dr. Catalina Sood  [address]   [address]  [facility] [address]  [phone]    Dr. Coles  [address] [facility] [address]  [phone]    Dr. Hester, Dr. Florez  [address] [facility] [place] [address]  [phone]    Dr. Correa  [address]                                                              [address]  [facility] [address]  [phone]    Dr. Pierre  [address] [facility] [address]  [phone]    Dr. Nae Mccullough  [address]  [address]  [place] [address]  [phone]    Dr. Corral and [facility]   [address] [facility]  [address]  [place] [address]  [phone]

## 2023-12-20 DIAGNOSIS — F20.0 PARANOID SCHIZOPHRENIA: ICD-10-CM

## 2023-12-20 DIAGNOSIS — F33.42 RECURRENT MAJOR DEPRESSIVE DISORDER, IN FULL REMISSION: ICD-10-CM

## 2023-12-20 LAB
QT INTERVAL: 310 MS
QTC INTERVAL: 445 MS

## 2023-12-20 RX ORDER — PALIPERIDONE PALMITATE 156 MG/ML
156 INJECTION INTRAMUSCULAR
Qty: 1 ML | Refills: 2 | Status: SHIPPED | OUTPATIENT
Start: 2023-12-20

## 2023-12-24 LAB
BACTERIA SPEC AEROBE CULT: NORMAL
BACTERIA SPEC AEROBE CULT: NORMAL

## 2023-12-27 ENCOUNTER — OFFICE VISIT (OUTPATIENT)
Dept: PSYCHIATRY | Facility: CLINIC | Age: 48
End: 2023-12-27
Payer: MEDICARE

## 2023-12-27 VITALS
DIASTOLIC BLOOD PRESSURE: 80 MMHG | HEART RATE: 64 BPM | BODY MASS INDEX: 39.69 KG/M2 | OXYGEN SATURATION: 97 % | HEIGHT: 63 IN | WEIGHT: 224 LBS | SYSTOLIC BLOOD PRESSURE: 120 MMHG

## 2023-12-27 DIAGNOSIS — G47.09 OTHER INSOMNIA: ICD-10-CM

## 2023-12-27 DIAGNOSIS — F20.0 PARANOID SCHIZOPHRENIA: Primary | ICD-10-CM

## 2023-12-27 DIAGNOSIS — F33.1 MODERATE EPISODE OF RECURRENT MAJOR DEPRESSIVE DISORDER: ICD-10-CM

## 2023-12-27 RX ORDER — OLANZAPINE 5 MG/1
5 TABLET ORAL NIGHTLY
Qty: 30 TABLET | Refills: 2 | Status: SHIPPED | OUTPATIENT
Start: 2023-12-27 | End: 2024-12-26

## 2023-12-27 NOTE — PROGRESS NOTES
Subjective   Jazzy Valle is a 48 y.o. female who presents today for follow up    Chief Complaint: Schizophrenia      History of Present Illness: Patient presenting today for follow-up.  Since last visit, she has struggled somewhat.  She is having hallucinations and thinks that there is a chip inside of her head controlling her and making her change voices and personalities.  She is pleasant and cooperative and has been compliant with medications and has not done anything harmful to herself or others.  She reports overall mood is fairly stable but does fluctuate somewhat.  She denies SI/HI.      The following portions of the patient's history were reviewed and updated as appropriate: allergies, current medications, past family history, past medical history, past social history, past surgical history and problem list.      Past Medical History:  Past Medical History:   Diagnosis Date    Anxiety     Bipolar disorder     Depression     Hernia of abdominal wall     Hypertension     PCO (polycystic ovaries)     Psychosis     Schizoaffective disorder     Substance abuse        Social History:  Social History     Socioeconomic History    Marital status: Single   Tobacco Use    Smoking status: Every Day     Packs/day: 1.00     Years: 20.00     Additional pack years: 0.00     Total pack years: 20.00     Types: Cigarettes    Smokeless tobacco: Never   Vaping Use    Vaping Use: Former    Substances: Nicotine, Flavoring    Devices: Disposable   Substance and Sexual Activity    Alcohol use: No    Drug use: Yes     Types: Methamphetamines    Sexual activity: Defer       Family History:  Family History   Problem Relation Age of Onset    Breast cancer Neg Hx        Past Surgical History:  Past Surgical History:   Procedure Laterality Date    BACK SURGERY  2008    BREAST EXCISIONAL BIOPSY Right     benign    CERVICAL SPINE SURGERY  2008    KNEE SURGERY Right 2014       Problem List:  Patient Active Problem List   Diagnosis     Essential hypertension    Obesity (BMI 30-39.9)    Tobacco abuse    Chest pressure    Psychosis    Acute psychosis    Chronic residual schizophrenia with acute exacerbations    Methamphetamine use disorder, severe, dependence    Anemia, iron deficiency       Allergy:   Allergies   Allergen Reactions    Abilify [Aripiprazole] Mental Status Change    Cymbalta [Duloxetine Hcl] Other (See Comments)     Hot all over.        Current Medications:   Current Outpatient Medications   Medication Sig Dispense Refill    atenolol (TENORMIN) 50 MG tablet Take 1 tablet by mouth Daily. for blood pressure      atenolol (TENORMIN) 50 MG tablet Take 1 tablet by mouth Daily. 30 tablet 0    ferrous sulfate 325 (65 FE) MG tablet Take 1 tablet by mouth Daily With Breakfast. 30 tablet 2    paliperidone palmitate (Invega Sustenna) 156 MG/ML suspension prefilled syringe IM injection Inject 1 mL into the appropriate muscle as directed by prescriber Every 28 (Twenty-Eight) Days. 1 mL 2    doxycycline (MONODOX) 100 MG capsule Take 1 capsule by mouth 2 (Two) Times a Day. (Patient not taking: Reported on 12/27/2023) 14 capsule 0    escitalopram (LEXAPRO) 20 MG tablet Take 1 tablet by mouth Daily. Indications: Major Depressive Disorder (Patient not taking: Reported on 12/27/2023) 30 tablet 2    risperiDONE (risperDAL) 1 MG tablet Take 1 tablet by mouth Every Night. Indications: Schizophrenia (Patient not taking: Reported on 12/27/2023) 30 tablet 2     No current facility-administered medications for this visit.       Review of Symptoms:    Review of Systems   Constitutional:  Negative for activity change and fatigue.   HENT: Negative.     Eyes: Negative.    Respiratory: Negative.     Cardiovascular: Negative.    Gastrointestinal: Negative.    Endocrine: Negative.    Genitourinary: Negative.    Musculoskeletal: Negative.    Allergic/Immunologic: Negative.    Neurological: Negative.    Hematological: Negative.    Psychiatric/Behavioral:  Positive for  "dysphoric mood, hallucinations and stress. The patient is nervous/anxious.          Physical Exam:   Blood pressure 120/80, pulse 64, height 160 cm (62.99\"), weight 102 kg (224 lb), SpO2 97%, not currently breastfeeding.      Appearance: Overweight CF of stated age, NAD   Gait, Station, Strength: WNL    Mental Status Exam:   Hygiene:   good  Cooperation:  Cooperative  Eye Contact:  Good  Psychomotor Behavior:  Appropriate  Affect:  Restricted   Mood: depressed, anxious, and fluctates  Hopelessness: Denies  Speech:  Normal  Thought Process:  Goal directed and Linear  Thought Content:  Bizarre and Mood congruent  Suicidal:  None  Homicidal:  None  Hallucinations:  Auditory  Delusion:  Paranoid   Memory:  Intact  Orientation:  Person, Place, Time and Situation  Reliability:  fair  Insight:  Poor but improving  Judgement:  Fair  Impulse Control:  Good  Physical/Medical Issues:  No        Lab Results:   Admission on 12/19/2023, Discharged on 12/19/2023   Component Date Value Ref Range Status    QT Interval 12/19/2023 310  ms Final    QTC Interval 12/19/2023 445  ms Final    Glucose 12/19/2023 148 (H)  65 - 99 mg/dL Final    BUN 12/19/2023 6  6 - 20 mg/dL Final    Creatinine 12/19/2023 0.79  0.57 - 1.00 mg/dL Final    Sodium 12/19/2023 136  136 - 145 mmol/L Final    Potassium 12/19/2023 3.9  3.5 - 5.2 mmol/L Final    Chloride 12/19/2023 101  98 - 107 mmol/L Final    CO2 12/19/2023 26.8  22.0 - 29.0 mmol/L Final    Calcium 12/19/2023 9.1  8.6 - 10.5 mg/dL Final    Total Protein 12/19/2023 6.7  6.0 - 8.5 g/dL Final    Albumin 12/19/2023 4.0  3.5 - 5.2 g/dL Final    ALT (SGPT) 12/19/2023 31  1 - 33 U/L Final    AST (SGOT) 12/19/2023 31  1 - 32 U/L Final    Alkaline Phosphatase 12/19/2023 103  39 - 117 U/L Final    Total Bilirubin 12/19/2023 0.2  0.0 - 1.2 mg/dL Final    Globulin 12/19/2023 2.7  gm/dL Final    A/G Ratio 12/19/2023 1.5  g/dL Final    BUN/Creatinine Ratio 12/19/2023 7.6  7.0 - 25.0 Final    Anion Gap 12/19/2023 " 8.2  5.0 - 15.0 mmol/L Final    eGFR 12/19/2023 92.4  >60.0 mL/min/1.73 Final    proBNP 12/19/2023 157.3  0.0 - 450.0 pg/mL Final    HS Troponin T 12/19/2023 9  <14 ng/L Final    Extra Tube 12/19/2023 Hold for add-ons.   Final    Auto resulted.    Extra Tube 12/19/2023 hold for add-on   Final    Auto resulted    Extra Tube 12/19/2023 Hold for add-ons.   Final    Auto resulted.    Extra Tube 12/19/2023 Hold for add-ons.   Final    Auto resulted    WBC 12/19/2023 9.44  3.40 - 10.80 10*3/mm3 Final    RBC 12/19/2023 3.88  3.77 - 5.28 10*6/mm3 Final    Hemoglobin 12/19/2023 10.8 (L)  12.0 - 15.9 g/dL Final    Hematocrit 12/19/2023 34.8  34.0 - 46.6 % Final    MCV 12/19/2023 89.7  79.0 - 97.0 fL Final    MCH 12/19/2023 27.8  26.6 - 33.0 pg Final    MCHC 12/19/2023 31.0 (L)  31.5 - 35.7 g/dL Final    RDW 12/19/2023 13.6  12.3 - 15.4 % Final    RDW-SD 12/19/2023 44.5  37.0 - 54.0 fl Final    MPV 12/19/2023 10.5  6.0 - 12.0 fL Final    Platelets 12/19/2023 250  140 - 450 10*3/mm3 Final    Neutrophil % 12/19/2023 86.1 (H)  42.7 - 76.0 % Final    Lymphocyte % 12/19/2023 6.5 (L)  19.6 - 45.3 % Final    Monocyte % 12/19/2023 6.4  5.0 - 12.0 % Final    Eosinophil % 12/19/2023 0.0 (L)  0.3 - 6.2 % Final    Basophil % 12/19/2023 0.4  0.0 - 1.5 % Final    Immature Grans % 12/19/2023 0.6 (H)  0.0 - 0.5 % Final    Neutrophils, Absolute 12/19/2023 8.13 (H)  1.70 - 7.00 10*3/mm3 Final    Lymphocytes, Absolute 12/19/2023 0.61 (L)  0.70 - 3.10 10*3/mm3 Final    Monocytes, Absolute 12/19/2023 0.60  0.10 - 0.90 10*3/mm3 Final    Eosinophils, Absolute 12/19/2023 0.00  0.00 - 0.40 10*3/mm3 Final    Basophils, Absolute 12/19/2023 0.04  0.00 - 0.20 10*3/mm3 Final    Immature Grans, Absolute 12/19/2023 0.06 (H)  0.00 - 0.05 10*3/mm3 Final    nRBC 12/19/2023 0.0  0.0 - 0.2 /100 WBC Final    Lactate 12/19/2023 1.4  0.5 - 2.0 mmol/L Final    Blood Culture 12/19/2023 No growth at 5 days   Final    Blood Culture 12/19/2023 No growth at 5 days   Final     COVID19 12/19/2023 Not Detected  Not Detected - Ref. Range Final    Influenza A PCR 12/19/2023 Not Detected  Not Detected Final    Influenza B PCR 12/19/2023 Not Detected  Not Detected Final    Procalcitonin 12/19/2023 0.03  0.00 - 0.25 ng/mL Final    Sed Rate 12/19/2023 21 (H)  0 - 20 mm/hr Final    C-Reactive Protein 12/19/2023 1.01 (H)  0.00 - 0.50 mg/dL Final    Site 12/19/2023 Left Brachial   Final    Francisco's Test 12/19/2023 N/A   Final    pH, Arterial 12/19/2023 7.403  7.350 - 7.450 pH units Final    pCO2, Arterial 12/19/2023 43.3  35.0 - 45.0 mm Hg Final    pO2, Arterial 12/19/2023 61.5 (L)  83.0 - 108.0 mm Hg Final    84 Value below reference range    HCO3, Arterial 12/19/2023 27.0 (H)  20.0 - 26.0 mmol/L Final    83 Value above reference range    Base Excess, Arterial 12/19/2023 1.9  0.0 - 2.0 mmol/L Final    O2 Saturation, Arterial 12/19/2023 92.0 (L)  94.0 - 99.0 % Final    84 Value below reference range    Hemoglobin, Blood Gas 12/19/2023 10.9 (L)  13.5 - 17.5 g/dL Final    84 Value below reference range    Hematocrit, Blood Gas 12/19/2023 33.5 (L)  38.0 - 51.0 % Final    84 Value below reference range    Oxyhemoglobin 12/19/2023 89.0 (L)  94 - 99 % Final    84 Value below reference range    Methemoglobin 12/19/2023 0.70  0.00 - 3.00 % Final    Carboxyhemoglobin 12/19/2023 2.6  0 - 5 % Final    A-a DO2 12/19/2023 34.0  0.0 - 300.0 mmHg Final    CO2 Content 12/19/2023 28.3  22 - 33 mmol/L Final    Barometric Pressure for Blood Gas 12/19/2023 734  mmHg Final    Modality 12/19/2023 Room Air   Final    FIO2 12/19/2023 21  % Final    Ventilator Mode 12/19/2023 NA   Final    Collected by 12/19/2023 [id number]   Final    Meter: [id number]     :  [id number]       Assessment & Plan   Diagnoses and all orders for this visit:    1. Paranoid schizophrenia (Primary)  -     paliperidone palmitate (INVEGA SUSTENNA) 234 MG/1.5ML suspension prefilled syringe IM injection; Inject 1.5 mL into the appropriate  muscle as directed by prescriber Every 28 (Twenty-Eight) Days.  Dispense: 1.5 mL; Refill: 2  -     OLANZapine (ZyPREXA) 5 MG tablet; Take 1 tablet by mouth Every Night.  Dispense: 30 tablet; Refill: 2    2. Moderate episode of recurrent major depressive disorder  -     paliperidone palmitate (INVEGA SUSTENNA) 234 MG/1.5ML suspension prefilled syringe IM injection; Inject 1.5 mL into the appropriate muscle as directed by prescriber Every 28 (Twenty-Eight) Days.  Dispense: 1.5 mL; Refill: 2  -     OLANZapine (ZyPREXA) 5 MG tablet; Take 1 tablet by mouth Every Night.  Dispense: 30 tablet; Refill: 2    3. Other insomnia  -     OLANZapine (ZyPREXA) 5 MG tablet; Take 1 tablet by mouth Every Night.  Dispense: 30 tablet; Refill: 2      -Patient having worsening auditory hallucinations and paranoia.  She is also having poor sleep.  -Reviewed previous available documentation  -Reviewed most recent available labs    -Risperdal Consta has been discontinued  -Increase Invega Sustenna to 234 mg every 28 days for schizophrenia  -Discontinue Lexapro  -Discontinue oral Risperdal  -Start Zyprexa 5 mg p.o. nightly for schizophrenia and mood stabilization.  This medication will also help with insomnia  -Encourage continued cessation of methamphetamine.  Patient has not had any relapses since last visit      Visit Diagnoses:    ICD-10-CM ICD-9-CM   1. Paranoid schizophrenia  F20.0 295.30   2. Moderate episode of recurrent major depressive disorder  F33.1 296.32   3. Other insomnia  G47.09 780.52         TREATMENT PLAN/GOALS: Continue supportive psychotherapy efforts and medications as indicated. Treatment and medication options discussed during today's visit. Patient acknowledged and verbally consented to continue with current treatment plan and was educated on the importance of compliance with treatment and follow-up appointments.    MEDICATION ISSUES:    Discussed medication options and treatment plan of prescribed medication as well  as the risks, benefits, and side effects including potential falls, possible impaired driving and metabolic adversities among others. Patient is agreeable to call the office with any worsening of symptoms or onset of side effects. Patient is agreeable to call 911 or go to the nearest ER should he/she begin having SI/HI.     MEDS ORDERED DURING VISIT:  New Medications Ordered This Visit   Medications    paliperidone palmitate (INVEGA SUSTENNA) 234 MG/1.5ML suspension prefilled syringe IM injection     Sig: Inject 1.5 mL into the appropriate muscle as directed by prescriber Every 28 (Twenty-Eight) Days.     Dispense:  1.5 mL     Refill:  2    OLANZapine (ZyPREXA) 5 MG tablet     Sig: Take 1 tablet by mouth Every Night.     Dispense:  30 tablet     Refill:  2       Return in about 4 weeks (around 1/24/2024).             This document has been electronically signed by Naldo Murphy MD  December 27, 2023 10:55 EST

## 2024-02-29 ENCOUNTER — TRANSCRIBE ORDERS (OUTPATIENT)
Dept: ADMINISTRATIVE | Facility: HOSPITAL | Age: 49
End: 2024-02-29
Payer: MEDICARE

## 2024-02-29 DIAGNOSIS — E04.1 NONTOXIC UNINODULAR GOITER: Primary | ICD-10-CM

## 2024-02-29 DIAGNOSIS — R13.19 CONSTANT LOW-GRADE DYSPHAGIA: ICD-10-CM

## 2024-02-29 DIAGNOSIS — R13.10 PROBLEMS WITH SWALLOWING AND MASTICATION: ICD-10-CM

## 2024-04-15 DIAGNOSIS — F33.1 MODERATE EPISODE OF RECURRENT MAJOR DEPRESSIVE DISORDER: ICD-10-CM

## 2024-04-15 DIAGNOSIS — F20.0 PARANOID SCHIZOPHRENIA: ICD-10-CM

## 2024-04-15 DIAGNOSIS — G47.09 OTHER INSOMNIA: ICD-10-CM

## 2024-04-15 RX ORDER — OLANZAPINE 5 MG/1
5 TABLET ORAL NIGHTLY
Qty: 30 TABLET | Refills: 2 | Status: SHIPPED | OUTPATIENT
Start: 2024-04-15 | End: 2025-04-15

## 2024-06-03 ENCOUNTER — TELEPHONE (OUTPATIENT)
Dept: PSYCHIATRY | Facility: CLINIC | Age: 49
End: 2024-06-03
Payer: MEDICARE

## 2024-06-03 NOTE — TELEPHONE ENCOUNTER
Father called states patient is in some trouble. States he is needing a statement to give to  assigned to her case stating what she is being treated for and how long she has been diagnosed. States possibly a med list also just so all bases are covered. States it is urgent is it can bee done as soon as possible it would be greatly appreciated

## 2025-01-25 ENCOUNTER — APPOINTMENT (OUTPATIENT)
Dept: CT IMAGING | Facility: HOSPITAL | Age: 50
End: 2025-01-25
Payer: MEDICARE

## 2025-01-25 ENCOUNTER — HOSPITAL ENCOUNTER (EMERGENCY)
Facility: HOSPITAL | Age: 50
Discharge: HOME | End: 2025-01-25
Attending: EMERGENCY MEDICINE
Payer: MEDICARE

## 2025-01-25 VITALS
BODY MASS INDEX: 37.21 KG/M2 | RESPIRATION RATE: 20 BRPM | HEIGHT: 63 IN | TEMPERATURE: 97.5 F | OXYGEN SATURATION: 98 % | SYSTOLIC BLOOD PRESSURE: 135 MMHG | HEART RATE: 103 BPM | WEIGHT: 210 LBS | DIASTOLIC BLOOD PRESSURE: 65 MMHG

## 2025-01-25 DIAGNOSIS — M48.00 SPINAL STENOSIS, UNSPECIFIED SPINAL REGION: Primary | ICD-10-CM

## 2025-01-25 PROCEDURE — 72131 CT LUMBAR SPINE W/O DYE: CPT | Performed by: RADIOLOGY

## 2025-01-25 PROCEDURE — 99284 EMERGENCY DEPT VISIT MOD MDM: CPT

## 2025-01-25 PROCEDURE — 72128 CT CHEST SPINE W/O DYE: CPT | Performed by: RADIOLOGY

## 2025-01-25 PROCEDURE — 72131 CT LUMBAR SPINE W/O DYE: CPT

## 2025-01-25 PROCEDURE — 72125 CT NECK SPINE W/O DYE: CPT | Performed by: RADIOLOGY

## 2025-01-25 PROCEDURE — 72128 CT CHEST SPINE W/O DYE: CPT

## 2025-01-25 PROCEDURE — 72125 CT NECK SPINE W/O DYE: CPT

## 2025-02-14 ENCOUNTER — OFFICE VISIT (OUTPATIENT)
Dept: NEUROSURGERY | Facility: CLINIC | Age: 50
End: 2025-02-14
Payer: MEDICARE

## 2025-02-14 VITALS — BODY MASS INDEX: 39.46 KG/M2 | HEIGHT: 63 IN | WEIGHT: 222.7 LBS | TEMPERATURE: 97.9 F

## 2025-02-14 DIAGNOSIS — M50.30 DDD (DEGENERATIVE DISC DISEASE), CERVICAL: ICD-10-CM

## 2025-02-14 DIAGNOSIS — G95.9 CERVICAL MYELOPATHY: Primary | ICD-10-CM

## 2025-02-14 DIAGNOSIS — Z72.0 TOBACCO ABUSE: ICD-10-CM

## 2025-02-14 PROCEDURE — 1159F MED LIST DOCD IN RCRD: CPT | Performed by: NEUROLOGICAL SURGERY

## 2025-02-14 PROCEDURE — 1160F RVW MEDS BY RX/DR IN RCRD: CPT | Performed by: NEUROLOGICAL SURGERY

## 2025-02-14 PROCEDURE — 99203 OFFICE O/P NEW LOW 30 MIN: CPT | Performed by: NEUROLOGICAL SURGERY

## 2025-02-14 RX ORDER — CELECOXIB 200 MG/1
CAPSULE ORAL
COMMUNITY
Start: 2025-02-06

## 2025-02-14 RX ORDER — IBUPROFEN 800 MG/1
TABLET, FILM COATED ORAL
COMMUNITY
Start: 2025-01-17

## 2025-02-14 RX ORDER — GABAPENTIN 100 MG/1
CAPSULE ORAL
COMMUNITY
Start: 2025-01-21

## 2025-02-14 NOTE — PROGRESS NOTES
Patient: Jazzy Valle  : 1975    Primary Care Provider: Connie May APRN    Requesting Provider: As above          History of Present Illness  The patient is a 49-year-old female who presents with the chief complaint of mid back pain and neck pain involving the arms.    Ms. Valle suffered a fall in  or so.  By August she was having some symptoms involving her interscapular region and neck.  She has had pain in her arms with numbness in her hands.  Her family member reports that she has had trouble using her hands.  She reports experiencing tenderness in her mid back region, which she describes as a sensation akin to soreness. She has not sought any therapeutic interventions for this condition. She also mentions an unusual sensation in her hands, which she characterizes as numbness, although she retains the ability to perceive touch. This numbness has been persistent since 2024 and has been accompanied by difficulty in performing tasks that require manual dexterity. Additionally, she reports a tingling sensation in her hands. She underwent a surgical procedure for a pinched nerve in her neck in , performed by Dr. Escobar. She was informed of a potential fracture in her lower back, but she does not experience any associated pain. She is a smoker, consuming approximately one pack of cigarettes daily. Her current medication regimen includes ibuprofen.    SOCIAL HISTORY  The patient smokes a pack a day.    MEDICATIONS  Ibuprofen      Review of Systems   Constitutional:  Negative for activity change, appetite change, chills, diaphoresis, fatigue, fever and unexpected weight change.   HENT:  Negative for congestion, dental problem, drooling, ear discharge, ear pain, facial swelling, hearing loss, mouth sores, nosebleeds, postnasal drip, rhinorrhea, sinus pressure, sinus pain, sneezing, sore throat, tinnitus, trouble swallowing and voice change.    Eyes:  Negative for photophobia, pain,  discharge, redness, itching and visual disturbance.   Respiratory:  Negative for apnea, cough, choking, chest tightness, shortness of breath, wheezing and stridor.    Cardiovascular:  Negative for chest pain, palpitations and leg swelling.   Gastrointestinal:  Negative for abdominal distention, abdominal pain, anal bleeding, blood in stool, constipation, diarrhea, nausea, rectal pain and vomiting.   Endocrine: Negative for cold intolerance, heat intolerance, polydipsia, polyphagia and polyuria.   Genitourinary:  Negative for decreased urine volume, difficulty urinating, dyspareunia, dysuria, enuresis, flank pain, frequency, genital sores, hematuria, menstrual problem, pelvic pain, urgency, vaginal bleeding, vaginal discharge and vaginal pain.   Musculoskeletal:  Positive for back pain and gait problem. Negative for arthralgias, joint swelling, myalgias, neck pain and neck stiffness.   Skin:  Negative for color change, pallor, rash and wound.   Allergic/Immunologic: Negative for environmental allergies, food allergies and immunocompromised state.   Neurological:  Positive for numbness. Negative for dizziness, tremors, seizures, syncope, facial asymmetry, speech difficulty, weakness, light-headedness and headaches.   Hematological:  Negative for adenopathy. Does not bruise/bleed easily.   Psychiatric/Behavioral:  Negative for agitation, behavioral problems, confusion, decreased concentration, dysphoric mood, hallucinations, self-injury, sleep disturbance and suicidal ideas. The patient is not nervous/anxious and is not hyperactive.    All other systems reviewed and are negative.    The patient's past medical history, past surgical history, family history, and social history have been reviewed at length in the electronic medical record.      Physical Exam:   CONSTITUTIONAL: Patient is well-nourished, pleasant and appears stated age.  MUSCULOSKELETAL:  Neck tenderness to palpation is not observed.   ROM in the neck is  limited in all directions.  NEUROLOGICAL:  Orientation, memory, attention span, language function, and cognition have been examined and are intact.  Strength is intact in the upper and lower extremities to direct testing.  Muscle tone is normal throughout.  Station and gait are stooped.  Sensation is intact to light touch testing throughout.  Deep tendon reflexes are 1+ and symmetrical in the lower extremities and 3+ in her upper extremities.  Laurie's Sign is positive bilaterally. No clonus is elicited.  Coordination is intact.      Medical Decision Making    Data Review:   (All imaging studies were personally reviewed unless stated otherwise)  Results  CT of the cervical spine dated 1/25/2025 demonstrates changes related to her prior anterior cervical fusion at C4-5.  There is diffuse spondylosis.  The imaging technique is suboptimal.    CT of the thoracic spine demonstrates some diffuse degenerative change without overt fracture or subluxation.    CT of the lumbar spine from the same date demonstrates diffuse spondylosis.  There is a grade 1 listhesis of L3 on L4.  There is a small crack through the L2-3 facet complex on the right.  This is best observed on the axial images.      Diagnosis:   1.  Cervical spondylosis with myelopathy.  I do not know whether the long tract signs on examination are new or old.  Given her recent and progressive hand symptoms I am concerned that this is a new issue.  2.  Morbid obesity.  3.  Tobacco abuse.    Treatment Options:   After talking with the patient I can see no reason why she cannot have an MRI study.  I have ordered a cervical MRI study.  She will follow-up thereafter.  Further recommendations will ensue.    Patient or patient representative verbalized consent for the use of Ambient Listening during the visit with  Houston Danielson MD for chart documentation. 2/14/2025  12:56 EST    Scribed for Houston Danielson MD by Radha Harmon CMA on 2/14/2025 12:56 EST        I, Dr. Danielson, personally performed the services described in the documentation, as scribed in my presence, and it is both accurate and complete.

## 2025-02-26 ENCOUNTER — HOSPITAL ENCOUNTER (OUTPATIENT)
Dept: MRI IMAGING | Facility: HOSPITAL | Age: 50
Discharge: HOME | End: 2025-02-26
Admitting: NEUROLOGICAL SURGERY
Payer: MEDICARE

## 2025-02-26 DIAGNOSIS — G95.9 CERVICAL MYELOPATHY: ICD-10-CM

## 2025-02-26 PROCEDURE — 72141 MRI NECK SPINE W/O DYE: CPT

## 2025-02-28 ENCOUNTER — OFFICE VISIT (OUTPATIENT)
Dept: NEUROSURGERY | Facility: CLINIC | Age: 50
End: 2025-02-28
Payer: MEDICARE

## 2025-02-28 VITALS — HEIGHT: 63 IN | WEIGHT: 221 LBS | RESPIRATION RATE: 18 BRPM | BODY MASS INDEX: 39.16 KG/M2 | TEMPERATURE: 98 F

## 2025-02-28 DIAGNOSIS — G95.9 CERVICAL MYELOPATHY: Primary | ICD-10-CM

## 2025-02-28 PROCEDURE — 1159F MED LIST DOCD IN RCRD: CPT | Performed by: NEUROLOGICAL SURGERY

## 2025-02-28 PROCEDURE — 1160F RVW MEDS BY RX/DR IN RCRD: CPT | Performed by: NEUROLOGICAL SURGERY

## 2025-02-28 PROCEDURE — 99214 OFFICE O/P EST MOD 30 MIN: CPT | Performed by: NEUROLOGICAL SURGERY

## 2025-02-28 NOTE — PROGRESS NOTES
Patient: Jazyz Valle  : 1975    Primary Care Provider: Connie May APRN    Requesting Provider: As above        History    Chief Complaint: Back and neck pain.    History of Present Illness: Ms. Valle suffered a fall in  or so.  By August she was having some symptoms involving her interscapular region and neck.  She has had pain in her arms with numbness in her hands.  Her family member reports that she has had trouble using her hands.  She reports experiencing tenderness in her mid back region, which she describes as a sensation akin to soreness. She has not sought any therapeutic interventions for this condition. She also mentions an unusual sensation in her hands, which she characterizes as numbness, although she retains the ability to perceive touch. This numbness has been persistent since 2024 and has been accompanied by difficulty in performing tasks that require manual dexterity. Additionally, she reports a tingling sensation in her hands. She underwent a surgical procedure for a pinched nerve in her neck in , performed by Dr. Escobar. She was informed of a potential fracture in her lower back, but she does not experience any associated pain. She is a smoker, consuming approximately one pack of cigarettes daily. Her current medication regimen includes ibuprofen.  She reports no change in her symptoms.  She is trying to cut down on smoking.  She appears to have an iron deficiency anemia that is being treated.  Apparently she has been given a diagnosis of COPD.  She has no cardiac issues.       Review of Systems   Constitutional:  Negative for activity change, appetite change, chills, diaphoresis, fatigue, fever and unexpected weight change.   HENT:  Negative for congestion, dental problem, drooling, ear discharge, ear pain, facial swelling, hearing loss, mouth sores, nosebleeds, postnasal drip, rhinorrhea, sinus pressure, sneezing, sore throat, tinnitus, trouble swallowing  "and voice change.    Eyes:  Negative for photophobia, pain, discharge, redness, itching and visual disturbance.   Respiratory:  Negative for apnea, cough, choking, chest tightness, shortness of breath, wheezing and stridor.    Cardiovascular:  Negative for chest pain, palpitations and leg swelling.   Gastrointestinal:  Negative for abdominal distention, abdominal pain, anal bleeding, blood in stool, constipation, diarrhea, nausea, rectal pain and vomiting.   Endocrine: Negative for cold intolerance, heat intolerance, polydipsia, polyphagia and polyuria.   Genitourinary:  Negative for decreased urine volume, difficulty urinating, dysuria, enuresis, flank pain, frequency, genital sores, hematuria and urgency.   Musculoskeletal:  Positive for myalgias, neck pain and neck stiffness. Negative for arthralgias, back pain, gait problem and joint swelling.   Skin:  Negative for color change, pallor, rash and wound.   Allergic/Immunologic: Negative for environmental allergies, food allergies and immunocompromised state.   Neurological:  Negative for dizziness, tremors, seizures, syncope, facial asymmetry, speech difficulty, weakness, light-headedness, numbness and headaches.   Hematological:  Negative for adenopathy. Does not bruise/bleed easily.   Psychiatric/Behavioral:  Negative for agitation, behavioral problems, confusion, decreased concentration, dysphoric mood, hallucinations, self-injury, sleep disturbance and suicidal ideas. The patient is not nervous/anxious and is not hyperactive.    All other systems reviewed and are negative.      The patient's past medical history, past surgical history, family history, and social history have been reviewed at length in the electronic medical record.      Physical Exam:   Temp 98 °F (36.7 °C) (Infrared)   Resp 18   Ht 160 cm (63\")   Wt 100 kg (221 lb)   BMI 39.15 kg/m²   Deep tendon reflexes are 1+ and symmetrical in the lower extremities and 3+ in her upper extremities.  " Laurie's Sign is positive bilaterally. No clonus is elicited.  Coordination is intact.       Medical Decision Making    Data Review:   (All imaging studies were personally reviewed unless stated otherwise)  MRI of the cervical spine dated 2/26/2025 demonstrates the prior uninstrumented fusion at C4-5.  There is diffuse significant stenosis throughout the entire cervical spine.  There is diffuse spondylosis and disc disease.  The narrowing is present from the mid C2 level down to the upper C7 level.  There is reina signal change within the cord at C3-4.    H&H on 2/24/2025: 7.0/26.7  Iron level was 10 with  being normal.  Her total iron binding capacity was 374 with normal being 250-4 50    Diagnosis:   1.  Cervical spondylosis and stenosis with myelopathy.  2.  Anemia.  3.  Tobacco abuse.  4.  Obesity.    Treatment Options:   As soon as we can get her blood counts up the patient should undergo extensive dorsal decompression with fusion and stabilization.  Fusion and stabilization would extend from C2-T2 and her decompression would be from the mid C2 level down to the upper C7 level.  I have touched on the nature of this procedure as well as the potential risk, complications, limitations.  She is going to continue to work on smoking cessation.  I will see her back in 3 months.  If her anemia is improved then we will push ahead with surgical intervention.  Her  was present for today's discussion.      Scribed for Houston Danielson MD by Nancy Nair MA on 2/28/2025 14:05 EST      I, Dr. Danielson, personally performed the services described in the documentation, as scribed in my presence, and it is both accurate and complete.

## 2025-04-07 PROBLEM — K90.9 IRON MALABSORPTION: Status: ACTIVE | Noted: 2025-04-07

## 2025-04-17 ENCOUNTER — LAB (OUTPATIENT)
Dept: LAB | Facility: HOSPITAL | Age: 50
End: 2025-04-17
Payer: MEDICARE

## 2025-04-17 ENCOUNTER — TRANSCRIBE ORDERS (OUTPATIENT)
Dept: ADMINISTRATIVE | Facility: HOSPITAL | Age: 50
End: 2025-04-17
Payer: MEDICARE

## 2025-04-17 DIAGNOSIS — D50.9 IRON DEFICIENCY ANEMIA, UNSPECIFIED IRON DEFICIENCY ANEMIA TYPE: Primary | ICD-10-CM

## 2025-04-17 DIAGNOSIS — D50.9 IRON DEFICIENCY ANEMIA, UNSPECIFIED IRON DEFICIENCY ANEMIA TYPE: ICD-10-CM

## 2025-04-17 LAB
BASOPHILS # BLD AUTO: 0.05 10*3/MM3 (ref 0–0.2)
BASOPHILS NFR BLD AUTO: 0.7 % (ref 0–1.5)
DEPRECATED RDW RBC AUTO: 76.8 FL (ref 37–54)
EOSINOPHIL # BLD AUTO: 0 10*3/MM3 (ref 0–0.4)
EOSINOPHIL NFR BLD AUTO: 0 % (ref 0.3–6.2)
ERYTHROCYTE [DISTWIDTH] IN BLOOD BY AUTOMATED COUNT: 23.8 % (ref 12.3–15.4)
HCT VFR BLD AUTO: 35.9 % (ref 34–46.6)
HGB BLD-MCNC: 10.7 G/DL (ref 12–15.9)
IMM GRANULOCYTES # BLD AUTO: 0.03 10*3/MM3 (ref 0–0.05)
IMM GRANULOCYTES NFR BLD AUTO: 0.4 % (ref 0–0.5)
IRON 24H UR-MRATE: 46 MCG/DL (ref 37–145)
IRON SATN MFR SERPL: 11 % (ref 20–50)
LYMPHOCYTES # BLD AUTO: 2.51 10*3/MM3 (ref 0.7–3.1)
LYMPHOCYTES NFR BLD AUTO: 33.1 % (ref 19.6–45.3)
MCH RBC QN AUTO: 26.4 PG (ref 26.6–33)
MCHC RBC AUTO-ENTMCNC: 29.8 G/DL (ref 31.5–35.7)
MCV RBC AUTO: 88.4 FL (ref 79–97)
MONOCYTES # BLD AUTO: 0.49 10*3/MM3 (ref 0.1–0.9)
MONOCYTES NFR BLD AUTO: 6.5 % (ref 5–12)
NEUTROPHILS NFR BLD AUTO: 4.51 10*3/MM3 (ref 1.7–7)
NEUTROPHILS NFR BLD AUTO: 59.3 % (ref 42.7–76)
NRBC BLD AUTO-RTO: 0 /100 WBC (ref 0–0.2)
PLATELET # BLD AUTO: 259 10*3/MM3 (ref 140–450)
PMV BLD AUTO: 11.3 FL (ref 6–12)
RBC # BLD AUTO: 4.06 10*6/MM3 (ref 3.77–5.28)
TIBC SERPL-MCNC: 404 MCG/DL (ref 298–536)
TRANSFERRIN SERPL-MCNC: 271 MG/DL (ref 200–360)
WBC NRBC COR # BLD AUTO: 7.59 10*3/MM3 (ref 3.4–10.8)

## 2025-04-17 PROCEDURE — 36415 COLL VENOUS BLD VENIPUNCTURE: CPT

## 2025-04-17 PROCEDURE — 85025 COMPLETE CBC W/AUTO DIFF WBC: CPT

## 2025-04-17 PROCEDURE — 83540 ASSAY OF IRON: CPT

## 2025-04-17 PROCEDURE — 84466 ASSAY OF TRANSFERRIN: CPT

## 2025-05-01 ENCOUNTER — TRANSCRIBE ORDERS (OUTPATIENT)
Dept: ADMINISTRATIVE | Facility: HOSPITAL | Age: 50
End: 2025-05-01
Payer: MEDICARE

## 2025-05-01 DIAGNOSIS — Z12.31 VISIT FOR SCREENING MAMMOGRAM: Primary | ICD-10-CM

## 2025-05-20 ENCOUNTER — TELEPHONE (OUTPATIENT)
Dept: NEUROSURGERY | Facility: CLINIC | Age: 50
End: 2025-05-20

## 2025-05-20 ENCOUNTER — OFFICE VISIT (OUTPATIENT)
Dept: PSYCHIATRY | Facility: CLINIC | Age: 50
End: 2025-05-20
Payer: MEDICARE

## 2025-05-20 VITALS
OXYGEN SATURATION: 97 % | DIASTOLIC BLOOD PRESSURE: 84 MMHG | SYSTOLIC BLOOD PRESSURE: 132 MMHG | WEIGHT: 206.2 LBS | HEIGHT: 63 IN | HEART RATE: 86 BPM | BODY MASS INDEX: 36.54 KG/M2

## 2025-05-20 DIAGNOSIS — F33.1 MODERATE EPISODE OF RECURRENT MAJOR DEPRESSIVE DISORDER: ICD-10-CM

## 2025-05-20 DIAGNOSIS — F20.0 PARANOID SCHIZOPHRENIA: ICD-10-CM

## 2025-05-20 DIAGNOSIS — Z72.0 TOBACCO USE: Primary | ICD-10-CM

## 2025-05-20 DIAGNOSIS — G47.09 OTHER INSOMNIA: ICD-10-CM

## 2025-05-20 RX ORDER — OLANZAPINE 10 MG/1
10 TABLET, FILM COATED ORAL NIGHTLY
Qty: 30 TABLET | Refills: 1 | Status: SHIPPED | OUTPATIENT
Start: 2025-05-20 | End: 2026-05-20

## 2025-05-20 NOTE — TELEPHONE ENCOUNTER
Caller: LEANN    Relationship:  [facility]    Best call back number: [phone]    PATIENT CALLED REQUESTING TO CANCEL SAME DAY APPT.    Did the patient call AFTER the start time of their scheduled appointment?  [x]YES  []NO    Was the patient's appointment rescheduled? []YES  [x]NO    Any additional information: PATIENTS DAD CALLED NEEDING TO RESCHEDULED MISSED APPT WITH DR GALAVIZ TODAY - STATES HE HAD TO TAKE THE PATIENT TO THE HOSPITAL TODAY AND FORGOT ABOUT THIS APPT    PLEASE CALL TO RESCHEDULE  THANK YOU

## 2025-05-20 NOTE — PROGRESS NOTES
"  Subjective     Jazzy Valle is a 49 y.o. female who presents today for initial evaluation     Chief Complaint: Paranoid schizophrenia       History of Present Illness:    History of Present Illness  Jazzy is a 49-year-old female who presents today for an initial evaluation with me.  Her father brings her to the clinic today with a letter stating that Jazzy did not have an appointment but is \"a miserable lady who needs help.\"  Father states that Jazzy has been constantly talking to voices.  Father also states that Jazzy lost her medication and did not notify him.  She has been taking olanzapine 5 mg and had been doing well until recently.  He had previously taken Invega Sustenna but patient and father both state that she has done better since discontinuing this medication.  She quit because the shelter took her off of the injection.  It is that she went to shelter because she \"got in some trouble\" helping her cousins who were in active addiction.  He has previously seen Dr. Murphy and Dr. Barone here for several years.  Had been on olanzapine and Invega Sustenna for many years as well.  She denies feeling depressed or anxious.  States that she has been having some auditory hallucinations but is unable to describe what she hears.  States \"I do not know\" when asked about the hallucinations.  Denies any visual hallucinations.  Denies any tactile hallucinations.  No suicidal thoughts.  No thoughts of self-harm.  No homicidal thoughts.  Denies feeling paranoid.  No delusional statements made.  Feels like anxiety and depression have been under control.  Dad did write a letter that states that all she does is \"sit, drink pop, and smoke.\"  Denies any side effects of the medications.  No EPS observed.       The following portions of the patient's history were reviewed and updated as appropriate: allergies, current medications, past family history, past medical history, past social history, past surgical history and problem " list.    Past Psychiatric History:  Began Treatment: 10+ years ago  Diagnoses: Paranoid schizophrenia, bipolar disorder, insomnia, depression  Psychiatrist: Has seen RICARDO Crockett, Dr. Barone, Dr. Murphy,  Admission History: Has been in meeting to the [facility] a couple of times, denies any other inpatient admissions.  Medication Trials: vraylar, seroquel,  invega sustenna , risperdal, wellbutrin, abilify, haldol, lexapro, Zyprexa, Risperdal Consta, Latuda  Suicide Attempts:Denies    Past Medical History:  Past Medical History:   Diagnosis Date    Anxiety     Bipolar disorder     Depression     Hernia of abdominal wall     Hypertension     PCO (polycystic ovaries)     Psychosis     Schizoaffective disorder     Substance abuse        Substance Abuse History:   Previous meth use, opiate use and suboxone in the past.     Social History:  Social History     Socioeconomic History    Marital status: Single   Tobacco Use    Smoking status: Every Day     Current packs/day: 1.00     Average packs/day: 1 pack/day for 50.4 years (50.4 ttl pk-yrs)     Types: Cigarettes     Start date: 1995     Passive exposure: Current    Smokeless tobacco: Never   Vaping Use    Vaping status: Former    Substances: Nicotine, Flavoring    Devices: Disposable    Passive vaping exposure: Yes   Substance and Sexual Activity    Alcohol use: No    Drug use: Not Currently     Types: Methamphetamines    Sexual activity: Defer       Family History:  Family History   Problem Relation Age of Onset    No Known Problems Father     Breast cancer Neg Hx        Past Surgical History:  Past Surgical History:   Procedure Laterality Date    BACK SURGERY  2008    CERVICAL SPINE SURGERY  03/21/2009    ACDF C4-5- Dr. Mark Anthony Escobar    KNEE SURGERY Right 2014       Problem List:  Patient Active Problem List   Diagnosis    Essential hypertension    Obesity (BMI 30-39.9)    Tobacco abuse    Chest pressure    Psychosis    Acute psychosis    Chronic residual  "schizophrenia with acute exacerbations    Methamphetamine use disorder, severe, dependence    Anemia, iron deficiency    Iron malabsorption       Allergy:   Allergies   Allergen Reactions    Abilify [Aripiprazole] Mental Status Change    Cymbalta [Duloxetine Hcl] Other (See Comments)     Hot all over.        Current Medications:   Current Outpatient Medications   Medication Sig Dispense Refill    atenolol (TENORMIN) 50 MG tablet Take 1 tablet by mouth Daily. for blood pressure      atenolol (TENORMIN) 50 MG tablet Take 1 tablet by mouth Daily. 30 tablet 0    ferrous sulfate 325 (65 FE) MG tablet Take 1 tablet by mouth Daily With Breakfast. 30 tablet 2    ibuprofen (ADVIL,MOTRIN) 800 MG tablet       OLANZapine (ZyPREXA) 10 MG tablet Take 1 tablet by mouth Every Night. 30 tablet 1    celecoxib (CeleBREX) 200 MG capsule  (Patient not taking: Reported on 5/20/2025)      gabapentin (NEURONTIN) 100 MG capsule  (Patient not taking: Reported on 5/20/2025)       No current facility-administered medications for this visit.       Review of Systems:    Review of Systems   Constitutional:  Positive for activity change. Negative for appetite change.   Psychiatric/Behavioral:  Positive for decreased concentration and stress. Negative for agitation, dysphoric mood, suicidal ideas and depressed mood.          Physical Exam:   Physical Exam  Vitals reviewed.   Neurological:      Mental Status: She is alert and oriented to person, place, and time. Mental status is at baseline.         Vitals:  Blood pressure 132/84, pulse 86, height 160 cm (62.99\"), weight 93.5 kg (206 lb 3.2 oz), SpO2 97%, not currently breastfeeding.   Body mass index is 36.54 kg/m².    Last 3 Blood Pressure Readings:  BP Readings from Last 3 Encounters:   05/20/25 132/84   01/25/25 135/65   12/27/23 120/80       PHQ-9 Score:   PHQ-9 Total Score: PHQ-9 Depression Screening  Little interest or pleasure in doing things? Not at all   Feeling down, depressed, or " hopeless? Not at all   PHQ-2 Total Score 0   Trouble falling or staying asleep, or sleeping too much? Not at all   Feeling tired or having little energy? Not at all   Poor appetite or overeating? Not at all   Feeling bad about yourself - or that you are a failure or have let yourself or your family down? Not at all   Trouble concentrating on things, such as reading the newspaper or watching television? Not at all   Moving or speaking so slowly that other people could have noticed? Or the opposite - being so fidgety or restless that you have been moving around a lot more than usual? Not at all     Thoughts that you would be better off dead, or of hurting yourself in some way? Not at all   PHQ-9 Total Score 0   If you checked off any problems, how difficult have these problems made it for you to do your work, take care of things at home, or get along with other people? Not difficult at all             Mental Status Exam:   Hygiene:   fair  Cooperation:  Cooperative  Eye Contact:  Downcast  Psychomotor Behavior:  Appropriate  Affect:  Restricted  Mood: depressed  Hopelessness: Denies  Speech:  Normal  Thought Process:  Linear  Thought Content:  Normal  Suicidal:  None and Suicidal Ideation  Homicidal:  None  Hallucinations:  Auditory  Delusion:  None  Memory:  Deficits  Orientation:  Person, Place, Time, and Situation  Reliability:  fair  Insight:  Poor  Judgement:  Fair  Impulse Control:  Impaired  Physical/Medical Issues:  Yes see [facility]        Lab Results:   Lab on 04/17/2025   Component Date Value Ref Range Status    Iron 04/17/2025 46  37 - 145 mcg/dL Final    Iron Saturation (TSAT) 04/17/2025 11 (L)  20 - 50 % Final    Transferrin 04/17/2025 271  200 - 360 mg/dL Final    TIBC 04/17/2025 404  298 - 536 mcg/dL Final    WBC 04/17/2025 7.59  3.40 - 10.80 10*3/mm3 Final    RBC 04/17/2025 4.06  3.77 - 5.28 10*6/mm3 Final    Hemoglobin 04/17/2025 10.7 (L)  12.0 - 15.9 g/dL Final    Hematocrit 04/17/2025 35.9  34.0 - 46.6 %  Final    MCV 04/17/2025 88.4  79.0 - 97.0 fL Final    MCH 04/17/2025 26.4 (L)  26.6 - 33.0 pg Final    MCHC 04/17/2025 29.8 (L)  31.5 - 35.7 g/dL Final    RDW 04/17/2025 23.8 (H)  12.3 - 15.4 % Final    RDW-SD 04/17/2025 76.8 (H)  37.0 - 54.0 fl Final    MPV 04/17/2025 11.3  6.0 - 12.0 fL Final    Platelets 04/17/2025 259  140 - 450 10*3/mm3 Final    Neutrophil % 04/17/2025 59.3  42.7 - 76.0 % Final    Lymphocyte % 04/17/2025 33.1  19.6 - 45.3 % Final    Monocyte % 04/17/2025 6.5  5.0 - 12.0 % Final    Eosinophil % 04/17/2025 0.0 (L)  0.3 - 6.2 % Final    Basophil % 04/17/2025 0.7  0.0 - 1.5 % Final    Immature Grans % 04/17/2025 0.4  0.0 - 0.5 % Final    Neutrophils, Absolute 04/17/2025 4.51  1.70 - 7.00 10*3/mm3 Final    Lymphocytes, Absolute 04/17/2025 2.51  0.70 - 3.10 10*3/mm3 Final    Monocytes, Absolute 04/17/2025 0.49  0.10 - 0.90 10*3/mm3 Final    Eosinophils, Absolute 04/17/2025 0.00  0.00 - 0.40 10*3/mm3 Final    Basophils, Absolute 04/17/2025 0.05  0.00 - 0.20 10*3/mm3 Final    Immature Grans, Absolute 04/17/2025 0.03  0.00 - 0.05 10*3/mm3 Final    nRBC 04/17/2025 0.0  0.0 - 0.2 /100 WBC Final         Assessment & Plan   Diagnoses and all orders for this visit:    1. Tobacco use (Primary)    2. Moderate episode of recurrent major depressive disorder  -     OLANZapine (ZyPREXA) 10 MG tablet; Take 1 tablet by mouth Every Night.  Dispense: 30 tablet; Refill: 1    3. Paranoid schizophrenia  -     OLANZapine (ZyPREXA) 10 MG tablet; Take 1 tablet by mouth Every Night.  Dispense: 30 tablet; Refill: 1    4. Other insomnia  -     OLANZapine (ZyPREXA) 10 MG tablet; Take 1 tablet by mouth Every Night.  Dispense: 30 tablet; Refill: 1        Visit Diagnoses:    ICD-10-CM ICD-9-CM   1. Tobacco use  Z72.0 305.1   2. Moderate episode of recurrent major depressive disorder  F33.1 296.32   3. Paranoid schizophrenia  F20.0 295.30   4. Other insomnia  G47.09 780.52       GOALS:  Short Term Goals: Patient will be compliant  with medication, and patient will have no significant medication related side effects.  Patient will be engaged in psychotherapy as indicated.  Patient will report subjective improvement of symptoms.  Long term goals: To stabilize mood and treat/improve subjective symptoms, the patient will stay out of the hospital, the patient will be at an optimal level of functioning, and the patient will take all medications as prescribed.  The patient/guardian verbalized understanding and agreement with goals that were mutually set.      TREATMENT PLAN: Continue supportive psychotherapy efforts and medications as indicated.  Pharmacological and Non-Pharmacological treatment options discussed during today's visit. Patient/Guardian acknowledged and verbally consented with current treatment plan and was educated on the importance of compliance with treatment and follow-up appointments.      MEDICATION ISSUES:  Discussed medication options and treatment plan of prescribed medication as well as the risks, benefits, any black box warnings, and side effects including potential falls, possible impaired driving, and metabolic adversities among others. Patient is agreeable to call the office with any worsening of symptoms or onset of side effects, or if any concerns or questions arise.  The contact information for the office is made available to the patient. Patient is agreeable to call 911 or go to the nearest ER should they begin having any SI/HI, or if any urgent concerns arise. No medication side effects or related complaints today.     MEDS ORDERED DURING VISIT:  New Medications Ordered This Visit   Medications    OLANZapine (ZyPREXA) 10 MG tablet     Sig: Take 1 tablet by mouth Every Night.     Dispense:  30 tablet     Refill:  1     Plan:  - Increase Zyprexa to 10 mg by mouth every night.  - Patient verbalizes understanding to go to nearest ED if SI/HI develop.  -Patient verbalizes understanding of the risks and benefits of taking  this medication.  Patient discusses that she does better with p.o. medications versus [facility]'s and requests to change the dose of her oral medication.  Follow Up Appointment:   Return in about 3 weeks (around 6/10/2025).             This document has been electronically signed by RICARDO Aleman  May 20, 2025 16:07 EDT    Dictated Utilizing Dragon Dictation: Part of this note may be an electronic transcription/translation of spoken language to printed text using the Dragon Dictation System.

## 2025-05-21 ENCOUNTER — PATIENT ROUNDING (BHMG ONLY) (OUTPATIENT)
Dept: PSYCHIATRY | Facility: CLINIC | Age: 50
End: 2025-05-21
Payer: MEDICARE

## 2025-05-21 NOTE — PROGRESS NOTES
May 21, 2025    Hello, may I speak with Jazzy Valle?    My name is Beth      I am  with [facility]  [address] [place] [address]  [place] [address] [phone].    Before we get started may I verify your date of birth? 1975    I am calling to officially welcome you to our practice and ask about your recent visit. Is this a good time to talk? yes    Tell me about your visit with us. What things went well?  it went good, she was very nice.       We're always looking for ways to make our patients' experiences even better. Do you have recommendations on ways we may improve?  no    Overall were you satisfied with your first visit to our practice? no       I appreciate you taking the time to speak with me today. Is there anything else I can do for you? no      Thank you, and have a great day.

## 2025-06-10 ENCOUNTER — OFFICE VISIT (OUTPATIENT)
Dept: PSYCHIATRY | Facility: CLINIC | Age: 50
End: 2025-06-10
Payer: MEDICARE

## 2025-06-10 VITALS
BODY MASS INDEX: 37.39 KG/M2 | DIASTOLIC BLOOD PRESSURE: 72 MMHG | HEART RATE: 79 BPM | HEIGHT: 63 IN | OXYGEN SATURATION: 99 % | SYSTOLIC BLOOD PRESSURE: 114 MMHG | WEIGHT: 211 LBS

## 2025-06-10 DIAGNOSIS — F20.0 PARANOID SCHIZOPHRENIA: ICD-10-CM

## 2025-06-10 DIAGNOSIS — Z72.0 TOBACCO USE: Primary | ICD-10-CM

## 2025-06-10 DIAGNOSIS — G47.09 OTHER INSOMNIA: ICD-10-CM

## 2025-06-10 DIAGNOSIS — F33.1 MODERATE EPISODE OF RECURRENT MAJOR DEPRESSIVE DISORDER: ICD-10-CM

## 2025-06-10 RX ORDER — OLANZAPINE 5 MG/1
5 TABLET, FILM COATED ORAL NIGHTLY
Qty: 30 TABLET | Refills: 1 | Status: SHIPPED | OUTPATIENT
Start: 2025-06-10 | End: 2026-06-10

## 2025-06-10 RX ORDER — ESCITALOPRAM OXALATE 5 MG/1
5 TABLET ORAL DAILY
Qty: 30 TABLET | Refills: 1 | Status: SHIPPED | OUTPATIENT
Start: 2025-06-10

## 2025-06-10 NOTE — PROGRESS NOTES
Subjective     Jazzy Valle is a 49 y.o. female who presents today for initial evaluation     Chief Complaint: Paranoid schizophrenia       History of Present Illness:    History of Present Illness  Jazzy is a 49-year-old female who presents today for a follow-up visit with me.  Her father is present with her today and provides collateral information.  Jazzy's father states that she was too sedated with the increased dose of Zyprexa so continued to give her 5 mg.  States that she has done well and he feels like she has continued to have an increase in her motivation and desire to improve her health.  States that she had put off having a surgery to allow room for her spinal cord to travel through her cervical vertebrae but is now interested in pursuing the treatment.  Father states that he has noticed a decrease in her responding to internal stimuli and seems to be more aware of what is going on in the world around her.  She continues to hear hallucinations but denies any command hallucinations.  No visual hallucinations.  Jazzy denies any suicidal or homicidal thoughts at this time.  Denies any side effects of the medications.         The following portions of the patient's history were reviewed and updated as appropriate: allergies, current medications, past family history, past medical history, past social history, past surgical history and problem list.    Past Psychiatric History:  Began Treatment: 10+ years ago  Diagnoses:Paranoid schizophrenia, bipolar disorder, insomnia, depression  Psychiatrist:Has seen RICARDO Crockett, Dr. Barone, Dr. Murphy,  Admission History:Has been in meeting to the [facility] a couple of times, denies any other inpatient admissions.  Medication Trials:vraylar, seroquel, invega sustenna , risperdal, wellbutrin, abilify, haldol, lexapro, Zyprexa, Risperdal Consta, Latuda  Suicide Attempts:Denies    Past Medical History:  Past Medical History:   Diagnosis Date    Anxiety      Bipolar disorder     Depression     Hernia of abdominal wall     Hypertension     PCO (polycystic ovaries)     Psychosis     Schizoaffective disorder     Substance abuse        Substance Abuse History:   Previous meth use, opiate use and suboxone in the past.     Social History:  Social History     Socioeconomic History    Marital status: Single   Tobacco Use    Smoking status: Every Day     Current packs/day: 1.00     Average packs/day: 1 pack/day for 50.4 years (50.4 ttl pk-yrs)     Types: Cigarettes     Start date: 1995     Passive exposure: Current    Smokeless tobacco: Never   Vaping Use    Vaping status: Former    Substances: Nicotine, Flavoring    Devices: Disposable    Passive vaping exposure: Yes   Substance and Sexual Activity    Alcohol use: No    Drug use: Not Currently     Types: Methamphetamines    Sexual activity: Defer       Family History:  Family History   Problem Relation Age of Onset    No Known Problems Father     Breast cancer Neg Hx        Past Surgical History:  Past Surgical History:   Procedure Laterality Date    BACK SURGERY  2008    CERVICAL SPINE SURGERY  03/21/2009    ACDF C4-5- Dr. Mark Anthony Escobar    KNEE SURGERY Right 2014       Problem List:  Patient Active Problem List   Diagnosis    Essential hypertension    Obesity (BMI 30-39.9)    Tobacco abuse    Chest pressure    Psychosis    Acute psychosis    Chronic residual schizophrenia with acute exacerbations    Methamphetamine use disorder, severe, dependence    Anemia, iron deficiency    Iron malabsorption       Allergy:   Allergies   Allergen Reactions    Abilify [Aripiprazole] Mental Status Change    Cymbalta [Duloxetine Hcl] Other (See Comments)     Hot all over.        Current Medications:   Current Outpatient Medications   Medication Sig Dispense Refill    atenolol (TENORMIN) 50 MG tablet Take 1 tablet by mouth Daily. for blood pressure      atenolol (TENORMIN) 50 MG tablet Take 1 tablet by mouth Daily. 30 tablet 0    ferrous  "sulfate 325 (65 FE) MG tablet Take 1 tablet by mouth Daily With Breakfast. 30 tablet 2    ibuprofen (ADVIL,MOTRIN) 800 MG tablet       OLANZapine (ZyPREXA) 5 MG tablet Take 1 tablet by mouth Every Night. 30 tablet 1    escitalopram (Lexapro) 5 MG tablet Take 1 tablet by mouth Daily. 30 tablet 1     No current facility-administered medications for this visit.       Review of Systems:    Review of Systems   Constitutional:  Positive for activity change. Negative for appetite change.   Psychiatric/Behavioral:  Positive for stress. Negative for agitation, decreased concentration, dysphoric mood, suicidal ideas and depressed mood. The patient is nervous/anxious.          Physical Exam:   Physical Exam  Vitals reviewed.   Neurological:      Mental Status: She is alert and oriented to person, place, and time. Mental status is at baseline.   Psychiatric:         Attention and Perception: Attention and perception normal.         Mood and Affect: Affect normal. Mood is anxious.         Speech: Speech normal.         Behavior: Behavior normal. Behavior is cooperative.         Thought Content: Thought content normal.         Cognition and Memory: Cognition is impaired. Memory is impaired.         Judgment: Judgment is impulsive.         Vitals:  Blood pressure 114/72, pulse 79, height 160 cm (62.99\"), weight 95.7 kg (211 lb), SpO2 99%, not currently breastfeeding.   Body mass index is 37.39 kg/m².    Last 3 Blood Pressure Readings:  BP Readings from Last 3 Encounters:   06/10/25 114/72   05/20/25 132/84   01/25/25 135/65       PHQ-9 Score:   PHQ-9 Total Score: PHQ-9 Depression Screening  Little interest or pleasure in doing things? Not at all   Feeling down, depressed, or hopeless? Several days   PHQ-2 Total Score 1   Trouble falling or staying asleep, or sleeping too much? Not at all   Feeling tired or having little energy? Not at all   Poor appetite or overeating? Not at all   Feeling bad about yourself - or that you are a " failure or have let yourself or your family down? Not at all   Trouble concentrating on things, such as reading the newspaper or watching television? Not at all   Moving or speaking so slowly that other people could have noticed? Or the opposite - being so fidgety or restless that you have been moving around a lot more than usual? Not at all     Thoughts that you would be better off dead, or of hurting yourself in some way? Not at all   PHQ-9 Total Score 1   If you checked off any problems, how difficult have these problems made it for you to do your work, take care of things at home, or get along with other people? Not difficult at all             Mental Status Exam:   Hygiene:   fair  Cooperation:  Cooperative  Eye Contact:  Downcast  Psychomotor Behavior:  Appropriate  Affect:  Restricted  Mood: anxious  Hopelessness: Denies  Speech:  Normal  Thought Process:  Linear  Thought Content:  Normal  Suicidal:  None  Homicidal:  None  Hallucinations:  Auditory  Delusion:  None  Memory:  Deficits  Orientation:  Person, Place, Time, and Situation  Reliability:  fair  Insight:  Poor  Judgement:  Fair  Impulse Control:  Impaired  Physical/Medical Issues:  Yes see [facility]       Lab Results:   Lab on 04/17/2025   Component Date Value Ref Range Status    Iron 04/17/2025 46  37 - 145 mcg/dL Final    Iron Saturation (TSAT) 04/17/2025 11 (L)  20 - 50 % Final    Transferrin 04/17/2025 271  200 - 360 mg/dL Final    TIBC 04/17/2025 404  298 - 536 mcg/dL Final    WBC 04/17/2025 7.59  3.40 - 10.80 10*3/mm3 Final    RBC 04/17/2025 4.06  3.77 - 5.28 10*6/mm3 Final    Hemoglobin 04/17/2025 10.7 (L)  12.0 - 15.9 g/dL Final    Hematocrit 04/17/2025 35.9  34.0 - 46.6 % Final    MCV 04/17/2025 88.4  79.0 - 97.0 fL Final    MCH 04/17/2025 26.4 (L)  26.6 - 33.0 pg Final    MCHC 04/17/2025 29.8 (L)  31.5 - 35.7 g/dL Final    RDW 04/17/2025 23.8 (H)  12.3 - 15.4 % Final    RDW-SD 04/17/2025 76.8 (H)  37.0 - 54.0 fl Final    MPV 04/17/2025 11.3  6.0  - 12.0 fL Final    Platelets 04/17/2025 259  140 - 450 10*3/mm3 Final    Neutrophil % 04/17/2025 59.3  42.7 - 76.0 % Final    Lymphocyte % 04/17/2025 33.1  19.6 - 45.3 % Final    Monocyte % 04/17/2025 6.5  5.0 - 12.0 % Final    Eosinophil % 04/17/2025 0.0 (L)  0.3 - 6.2 % Final    Basophil % 04/17/2025 0.7  0.0 - 1.5 % Final    Immature Grans % 04/17/2025 0.4  0.0 - 0.5 % Final    Neutrophils, Absolute 04/17/2025 4.51  1.70 - 7.00 10*3/mm3 Final    Lymphocytes, Absolute 04/17/2025 2.51  0.70 - 3.10 10*3/mm3 Final    Monocytes, Absolute 04/17/2025 0.49  0.10 - 0.90 10*3/mm3 Final    Eosinophils, Absolute 04/17/2025 0.00  0.00 - 0.40 10*3/mm3 Final    Basophils, Absolute 04/17/2025 0.05  0.00 - 0.20 10*3/mm3 Final    Immature Grans, Absolute 04/17/2025 0.03  0.00 - 0.05 10*3/mm3 Final    nRBC 04/17/2025 0.0  0.0 - 0.2 /100 WBC Final         Assessment & Plan   Diagnoses and all orders for this visit:    1. Tobacco use (Primary)    2. Moderate episode of recurrent major depressive disorder  -     OLANZapine (ZyPREXA) 5 MG tablet; Take 1 tablet by mouth Every Night.  Dispense: 30 tablet; Refill: 1    3. Paranoid schizophrenia  -     OLANZapine (ZyPREXA) 5 MG tablet; Take 1 tablet by mouth Every Night.  Dispense: 30 tablet; Refill: 1    4. Other insomnia  -     OLANZapine (ZyPREXA) 5 MG tablet; Take 1 tablet by mouth Every Night.  Dispense: 30 tablet; Refill: 1    Other orders  -     escitalopram (Lexapro) 5 MG tablet; Take 1 tablet by mouth Daily.  Dispense: 30 tablet; Refill: 1          Visit Diagnoses:    ICD-10-CM ICD-9-CM   1. Tobacco use  Z72.0 305.1   2. Moderate episode of recurrent major depressive disorder  F33.1 296.32   3. Paranoid schizophrenia  F20.0 295.30   4. Other insomnia  G47.09 780.52         GOALS:  Short Term Goals: Patient will be compliant with medication, and patient will have no significant medication related side effects.  Patient will be engaged in psychotherapy as indicated.  Patient will  report subjective improvement of symptoms.  Long term goals: To stabilize mood and treat/improve subjective symptoms, the patient will stay out of the hospital, the patient will be at an optimal level of functioning, and the patient will take all medications as prescribed.  The patient/guardian verbalized understanding and agreement with goals that were mutually set.      TREATMENT PLAN: Continue supportive psychotherapy efforts and medications as indicated.  Pharmacological and Non-Pharmacological treatment options discussed during today's visit. Patient/Guardian acknowledged and verbally consented with current treatment plan and was educated on the importance of compliance with treatment and follow-up appointments.      MEDICATION ISSUES:  Discussed medication options and treatment plan of prescribed medication as well as the risks, benefits, any black box warnings, and side effects including potential falls, possible impaired driving, and metabolic adversities among others. Patient is agreeable to call the office with any worsening of symptoms or onset of side effects, or if any concerns or questions arise.  The contact information for the office is made available to the patient. Patient is agreeable to call 911 or go to the nearest ER should they begin having any SI/HI, or if any urgent concerns arise. No medication side effects or related complaints today.     MEDS ORDERED DURING VISIT:  New Medications Ordered This Visit   Medications    escitalopram (Lexapro) 5 MG tablet     Sig: Take 1 tablet by mouth Daily.     Dispense:  30 tablet     Refill:  1    OLANZapine (ZyPREXA) 5 MG tablet     Sig: Take 1 tablet by mouth Every Night.     Dispense:  30 tablet     Refill:  1     Plan:  - Father states that she has done better with 5 mg of Zyprexa due to being overly sedated with 10 mg.  We will decrease back to 5 mg at night.  - We will add Lexapro 5 mg by mouth daily for mood.  Patient has done well with this  medication in the past for depression/anxiety.  - Patient verbalizes understanding to go to nearest ED if SI/HI develop.  -Patient verbalizes understanding of the risks and benefits of taking this medication.  Patient discusses that she does better with p.o. medications versus [facility]'s and requests to change the dose of her oral medication.  Follow Up Appointment:   Return in about 5 weeks (around 7/15/2025).             This document has been electronically signed by RICARDO Aleman  Ivon 10, 2025 11:01 EDT    Dictated Utilizing Dragon Dictation: Part of this note may be an electronic transcription/translation of spoken language to printed text using the Dragon Dictation System.

## 2025-06-20 ENCOUNTER — OFFICE VISIT (OUTPATIENT)
Dept: NEUROSURGERY | Facility: CLINIC | Age: 50
End: 2025-06-20
Payer: MEDICARE

## 2025-06-20 ENCOUNTER — PREP FOR SURGERY (OUTPATIENT)
Dept: OTHER | Facility: HOSPITAL | Age: 50
End: 2025-06-20
Payer: MEDICARE

## 2025-06-20 VITALS — WEIGHT: 200 LBS | HEIGHT: 63 IN | TEMPERATURE: 98 F | BODY MASS INDEX: 35.44 KG/M2

## 2025-06-20 DIAGNOSIS — M50.30 DDD (DEGENERATIVE DISC DISEASE), CERVICAL: ICD-10-CM

## 2025-06-20 DIAGNOSIS — M47.12 CERVICAL SPONDYLOSIS WITH MYELOPATHY: Primary | ICD-10-CM

## 2025-06-20 DIAGNOSIS — Z72.0 TOBACCO ABUSE: ICD-10-CM

## 2025-06-20 PROCEDURE — 1159F MED LIST DOCD IN RCRD: CPT | Performed by: NEUROLOGICAL SURGERY

## 2025-06-20 PROCEDURE — 1160F RVW MEDS BY RX/DR IN RCRD: CPT | Performed by: NEUROLOGICAL SURGERY

## 2025-06-20 PROCEDURE — 99214 OFFICE O/P EST MOD 30 MIN: CPT | Performed by: NEUROLOGICAL SURGERY

## 2025-06-20 RX ORDER — CHLORHEXIDINE GLUCONATE 40 MG/ML
SOLUTION TOPICAL
Qty: 120 ML | Refills: 0 | Status: SHIPPED | OUTPATIENT
Start: 2025-06-20

## 2025-06-20 RX ORDER — DEXAMETHASONE SODIUM PHOSPHATE 10 MG/ML
10 INJECTION, SOLUTION INTRAMUSCULAR; INTRAVENOUS
OUTPATIENT
Start: 2025-06-20

## 2025-06-20 RX ORDER — FAMOTIDINE 20 MG/1
20 TABLET, FILM COATED ORAL
OUTPATIENT
Start: 2025-06-20

## 2025-06-20 NOTE — H&P
Patient: Jazzy Valle  : 1975     Primary Care Provider: Connie May APRN     Requesting Provider: As above           History     Chief Complaint: Neck pain with numbness and weakness in the hands and arms.     History of Present Illness: Ms. Valle is a 49-year-old woman who suffered a fall in  or so a few thousand 24. By August she was having some symptoms involving her interscapular region and neck. She has had pain in her arms with numbness in her hands. Her family member reports that she has had trouble using her hands. She reports experiencing tenderness in her mid back region, which she describes as a sensation akin to soreness. She has not sought any therapeutic interventions for this condition. She also mentions an unusual sensation in her hands, which she characterizes as numbness, although she retains the ability to perceive touch. This numbness has been persistent since 2024 and has been accompanied by difficulty in performing tasks that require manual dexterity. Additionally, she reports a tingling sensation in her hands. She underwent a surgical procedure for a pinched nerve in her neck in , performed by Dr. Escobar. She was informed of a potential fracture in her lower back, but she does not experience any associated pain. She smokes but has decreased that to 4 cigarettes a day. She appears to have an iron deficiency anemia that is being treated. Apparently she has been given a diagnosis of COPD. She has no cardiac issues.      Review of Systems   Constitutional:  Negative for activity change, appetite change, chills, diaphoresis, fatigue, fever and unexpected weight change.   HENT:  Negative for congestion, dental problem, drooling, ear discharge, ear pain, facial swelling, hearing loss, mouth sores, nosebleeds, postnasal drip, rhinorrhea, sinus pressure, sinus pain, sneezing, sore throat, tinnitus, trouble swallowing and voice change.    Eyes:  Negative for  photophobia, pain, discharge, redness, itching and visual disturbance.   Respiratory:  Negative for apnea, cough, choking, chest tightness, shortness of breath, wheezing and stridor.    Cardiovascular:  Negative for chest pain, palpitations and leg swelling.   Gastrointestinal:  Negative for abdominal distention, abdominal pain, anal bleeding, blood in stool, constipation, diarrhea, nausea, rectal pain and vomiting.   Endocrine: Negative for cold intolerance, heat intolerance, polydipsia, polyphagia and polyuria.   Genitourinary:  Negative for decreased urine volume, difficulty urinating, dyspareunia, dysuria, enuresis, flank pain, frequency, genital sores, hematuria, menstrual problem, pelvic pain, urgency, vaginal bleeding, vaginal discharge and vaginal pain.   Musculoskeletal:  Positive for back pain, gait problem and neck pain. Negative for arthralgias, joint swelling, myalgias and neck stiffness.   Skin:  Negative for color change, pallor, rash and wound.   Allergic/Immunologic: Negative for environmental allergies, food allergies and immunocompromised state.   Neurological:  Positive for weakness. Negative for dizziness, tremors, seizures, syncope, facial asymmetry, speech difficulty, light-headedness, numbness and headaches.   Hematological:  Negative for adenopathy. Does not bruise/bleed easily.   Psychiatric/Behavioral:  Negative for agitation, behavioral problems, confusion, decreased concentration, dysphoric mood, hallucinations, self-injury, sleep disturbance and suicidal ideas. The patient is not nervous/anxious and is not hyperactive.          The patient's past medical history, past surgical history, family history, and social history have been reviewed at length in the electronic medical record.     Past Medical History:   Diagnosis Date    Anxiety     Bipolar disorder     Depression     Hernia of abdominal wall     Hypertension     PCO (polycystic ovaries)     Psychosis     Schizoaffective disorder   "   Substance abuse      Past Surgical History:   Procedure Laterality Date    BACK SURGERY  2008    CERVICAL SPINE SURGERY  03/21/2009    ACDF C4-5- Dr. Mark Anthony Escobar    KNEE SURGERY Right 2014     Family History   Problem Relation Age of Onset    No Known Problems Father     Breast cancer Neg Hx      Social History     Socioeconomic History    Marital status: Single   Tobacco Use    Smoking status: Every Day     Current packs/day: 0.25     Average packs/day: 1 pack/day for 30.5 years (30.2 ttl pk-yrs)     Types: Cigarettes     Start date: 1995     Passive exposure: Current    Smokeless tobacco: Never    Tobacco comments:     3-4 cigarettes a day as of 6/20/2025   Vaping Use    Vaping status: Former    Substances: Nicotine, Flavoring    Devices: Disposable    Passive vaping exposure: Yes   Substance and Sexual Activity    Alcohol use: No    Drug use: Not Currently     Types: Methamphetamines    Sexual activity: Defer           Allergies   Allergen Reactions    Abilify [Aripiprazole] Mental Status Change    Cymbalta [Duloxetine Hcl] Other (See Comments)     Hot all over.       Current Outpatient Medications on File Prior to Visit   Medication Sig Dispense Refill    atenolol (TENORMIN) 50 MG tablet Take 1 tablet by mouth Daily. for blood pressure      atenolol (TENORMIN) 50 MG tablet Take 1 tablet by mouth Daily. 30 tablet 0    escitalopram (Lexapro) 5 MG tablet Take 1 tablet by mouth Daily. 30 tablet 1    ferrous sulfate 325 (65 FE) MG tablet Take 1 tablet by mouth Daily With Breakfast. 30 tablet 2    ibuprofen (ADVIL,MOTRIN) 800 MG tablet       OLANZapine (ZyPREXA) 5 MG tablet Take 1 tablet by mouth Every Night. 30 tablet 1     No current facility-administered medications on file prior to visit.          Physical Exam:   Temp 98 °F (36.7 °C) (Infrared)   Ht 160 cm (63\")   Wt 90.7 kg (200 lb)   BMI 35.43 kg/m²   Laurie's sign is markedly positive bilaterally.  Reflexes in her upper extremities are 3+.  Lower " extremity reflexes are difficult to elicit throughout.  No clonus is elicited at the ankles.  Her gait remains slightly unsteady and slightly spastic.     Medical Decision Making     Data Review:   (All imaging studies were personally reviewed unless stated otherwise)  MRI of the cervical spine dated 2/26/2025 demonstrates the prior uninstrumented fusion at C4-5. There is diffuse significant stenosis throughout the entire cervical spine. There is diffuse spondylosis and disc disease. The narrowing is present from the mid C2 level down to the upper C7 level. There is reina signal change within the cord at C3-4.      Recent labs from April of this year demonstrate an iron level of 46.    Her H&H was 9.8/31.3  Total iron binding capacity was 404     Diagnosis:   Cervical spondylosis and stenosis with myelopathy.     Treatment Options:   I have recommended dorsal decompression from C2-C7 with fusion and stabilization from C2-T2.  The nature of the procedure as well as the potential risks, complications, limitations, and alternatives to the procedure were discussed at length with the patient and the patient has agreed to proceed with surgery.  The goal of surgery is to stabilize her myelopathy.  It may not correct her current neurologic difficulties.  She is going to continue to work on smoking cessation.  Given her improved but marginal H&H it is possible that she would require a transfusion in the perioperative period.

## 2025-06-20 NOTE — PROGRESS NOTES
Patient: Jazzy Valle  : 1975    Primary Care Provider: Connie May APRN    Requesting Provider: As above        History    Chief Complaint: Neck pain with numbness and weakness in the hands and arms.    History of Present Illness: Ms. Valle is a 49-year-old woman who suffered a fall in  or so a few thousand 24. By August she was having some symptoms involving her interscapular region and neck. She has had pain in her arms with numbness in her hands. Her family member reports that she has had trouble using her hands. She reports experiencing tenderness in her mid back region, which she describes as a sensation akin to soreness. She has not sought any therapeutic interventions for this condition. She also mentions an unusual sensation in her hands, which she characterizes as numbness, although she retains the ability to perceive touch. This numbness has been persistent since 2024 and has been accompanied by difficulty in performing tasks that require manual dexterity. Additionally, she reports a tingling sensation in her hands. She underwent a surgical procedure for a pinched nerve in her neck in , performed by Dr. Escobar. She was informed of a potential fracture in her lower back, but she does not experience any associated pain. She smokes but has decreased that to 4 cigarettes a day. She appears to have an iron deficiency anemia that is being treated. Apparently she has been given a diagnosis of COPD. She has no cardiac issues.     Review of Systems   Constitutional:  Negative for activity change, appetite change, chills, diaphoresis, fatigue, fever and unexpected weight change.   HENT:  Negative for congestion, dental problem, drooling, ear discharge, ear pain, facial swelling, hearing loss, mouth sores, nosebleeds, postnasal drip, rhinorrhea, sinus pressure, sinus pain, sneezing, sore throat, tinnitus, trouble swallowing and voice change.    Eyes:  Negative for photophobia,  "pain, discharge, redness, itching and visual disturbance.   Respiratory:  Negative for apnea, cough, choking, chest tightness, shortness of breath, wheezing and stridor.    Cardiovascular:  Negative for chest pain, palpitations and leg swelling.   Gastrointestinal:  Negative for abdominal distention, abdominal pain, anal bleeding, blood in stool, constipation, diarrhea, nausea, rectal pain and vomiting.   Endocrine: Negative for cold intolerance, heat intolerance, polydipsia, polyphagia and polyuria.   Genitourinary:  Negative for decreased urine volume, difficulty urinating, dyspareunia, dysuria, enuresis, flank pain, frequency, genital sores, hematuria, menstrual problem, pelvic pain, urgency, vaginal bleeding, vaginal discharge and vaginal pain.   Musculoskeletal:  Positive for back pain, gait problem and neck pain. Negative for arthralgias, joint swelling, myalgias and neck stiffness.   Skin:  Negative for color change, pallor, rash and wound.   Allergic/Immunologic: Negative for environmental allergies, food allergies and immunocompromised state.   Neurological:  Positive for weakness. Negative for dizziness, tremors, seizures, syncope, facial asymmetry, speech difficulty, light-headedness, numbness and headaches.   Hematological:  Negative for adenopathy. Does not bruise/bleed easily.   Psychiatric/Behavioral:  Negative for agitation, behavioral problems, confusion, decreased concentration, dysphoric mood, hallucinations, self-injury, sleep disturbance and suicidal ideas. The patient is not nervous/anxious and is not hyperactive.        The patient's past medical history, past surgical history, family history, and social history have been reviewed at length in the electronic medical record.      Physical Exam:   Temp 98 °F (36.7 °C) (Infrared)   Ht 160 cm (63\")   Wt 90.7 kg (200 lb)   BMI 35.43 kg/m²   Laurie's sign is markedly positive bilaterally.  Reflexes in her upper extremities are 3+.  Lower " extremity reflexes are difficult to elicit throughout.  No clonus is elicited at the ankles.  Her gait remains slightly unsteady and slightly spastic.    Medical Decision Making    Data Review:   (All imaging studies were personally reviewed unless stated otherwise)  MRI of the cervical spine dated 2/26/2025 demonstrates the prior uninstrumented fusion at C4-5. There is diffuse significant stenosis throughout the entire cervical spine. There is diffuse spondylosis and disc disease. The narrowing is present from the mid C2 level down to the upper C7 level. There is reina signal change within the cord at C3-4.     Recent labs from April of this year demonstrate an iron level of 46.    Her H&H was 9.8/31.3  Total iron binding capacity was 404    Diagnosis:   Cervical spondylosis and stenosis with myelopathy.    Treatment Options:   I have recommended dorsal decompression from C2-C7 with fusion and stabilization from C2-T2.  The nature of the procedure as well as the potential risks, complications, limitations, and alternatives to the procedure were discussed at length with the patient and the patient has agreed to proceed with surgery.  The goal of surgery is to stabilize her myelopathy.  It may not correct her current neurologic difficulties.  She is going to continue to work on smoking cessation.  Given her improved but marginal H&H it is possible that she would require a transfusion in the perioperative period.      Scribed for Houston Danielson MD by Radha Harmon CMA on 6/20/2025 16:19 EDT       I, Dr. Danielson, personally performed the services described in the documentation, as scribed in my presence, and it is both accurate and complete.

## 2025-06-20 NOTE — Clinical Note
Fusion and stabilization from C2-T2 and decompression mid C2 level down to the upper C7 level  Film=[facility]

## 2025-07-14 ENCOUNTER — PRE-ADMISSION TESTING (OUTPATIENT)
Dept: PREADMISSION TESTING | Facility: HOSPITAL | Age: 50
End: 2025-07-14
Payer: MEDICARE

## 2025-07-14 VITALS — BODY MASS INDEX: 38.4 KG/M2 | HEIGHT: 63 IN | WEIGHT: 216.71 LBS

## 2025-07-14 DIAGNOSIS — M47.12 CERVICAL SPONDYLOSIS WITH MYELOPATHY: ICD-10-CM

## 2025-07-14 LAB
DEPRECATED RDW RBC AUTO: 53.9 FL (ref 37–54)
ERYTHROCYTE [DISTWIDTH] IN BLOOD BY AUTOMATED COUNT: 16.6 % (ref 12.3–15.4)
HBA1C MFR BLD: 5.27 % (ref 4.8–5.6)
HCT VFR BLD AUTO: 36.6 % (ref 34–46.6)
HGB BLD-MCNC: 11.6 G/DL (ref 12–15.9)
MCH RBC QN AUTO: 28.2 PG (ref 26.6–33)
MCHC RBC AUTO-ENTMCNC: 31.7 G/DL (ref 31.5–35.7)
MCV RBC AUTO: 89.1 FL (ref 79–97)
PLATELET # BLD AUTO: 206 10*3/MM3 (ref 140–450)
PMV BLD AUTO: 11.2 FL (ref 6–12)
POTASSIUM SERPL-SCNC: 3.8 MMOL/L (ref 3.5–5.2)
RBC # BLD AUTO: 4.11 10*6/MM3 (ref 3.77–5.28)
WBC NRBC COR # BLD AUTO: 7.47 10*3/MM3 (ref 3.4–10.8)

## 2025-07-14 PROCEDURE — 87081 CULTURE SCREEN ONLY: CPT

## 2025-07-14 PROCEDURE — 85027 COMPLETE CBC AUTOMATED: CPT

## 2025-07-14 PROCEDURE — 36415 COLL VENOUS BLD VENIPUNCTURE: CPT

## 2025-07-14 PROCEDURE — 93005 ELECTROCARDIOGRAM TRACING: CPT

## 2025-07-14 PROCEDURE — 83036 HEMOGLOBIN GLYCOSYLATED A1C: CPT

## 2025-07-14 PROCEDURE — 84132 ASSAY OF SERUM POTASSIUM: CPT

## 2025-07-14 NOTE — PAT
Patient did not review general PAT education video as instructed in their preoperative information received from their surgeon.  One-on-one Pre Admission Testing general education provided during PAT visit.  Copies of [facility] general education handouts (Incentive Spirometry, Meds to Beds Program, Patient Belongings, Pre-op skin preparation instructions, Blood Glucose testing, Visitor policy, Surgery FAQ, Code H) distributed to patient if not viewed electronically on [facility] website. Encouraged patient/family to read PAT general education handouts (electronic copies or hard copies) thoroughly and notify PAT staff with any questions or concerns. Patient instructed to bring PAT pass and completed skin prep sheet (if applicable) on the day of procedure. Patient verbalized understanding of all information and priority content.     Bactroban (if prescribed) and Chlorhexidine Prescription prescribed by physician before PAT visit.  Verified with patient that medication(s) were picked up from their pharmacy.  Written instructions given to patient during PAT visit.  Patient/family also instructed to complete skin prep checklist and return the checklist on the day of surgery to preoperative staff.  Patient/family verbalized understanding.    Patient to apply Chlorhexadine wipes  to surgical area (as instructed) the night before procedure and the AM of procedure. Wipes provided.    Patient instructed to drink 20 ounces of Gatorade or Gatorlyte (if diabetic) and it needs to be completed 1 hour (for Main OR patients) or 2 hours (scheduled  section & [facility] patients) before given arrival time for procedure (NO RED Gatorade and NO Gatorade Zero).    Patient verbalized understanding.

## 2025-07-15 LAB — MRSA SPEC QL CULT: NORMAL

## 2025-07-16 ENCOUNTER — OFFICE VISIT (OUTPATIENT)
Dept: PSYCHIATRY | Facility: CLINIC | Age: 50
End: 2025-07-16
Payer: MEDICARE

## 2025-07-16 VITALS
SYSTOLIC BLOOD PRESSURE: 124 MMHG | BODY MASS INDEX: 37.56 KG/M2 | HEART RATE: 97 BPM | DIASTOLIC BLOOD PRESSURE: 80 MMHG | HEIGHT: 63 IN | OXYGEN SATURATION: 97 % | WEIGHT: 212 LBS

## 2025-07-16 DIAGNOSIS — F33.1 MODERATE EPISODE OF RECURRENT MAJOR DEPRESSIVE DISORDER: ICD-10-CM

## 2025-07-16 DIAGNOSIS — F20.0 PARANOID SCHIZOPHRENIA: ICD-10-CM

## 2025-07-16 DIAGNOSIS — Z72.0 TOBACCO USE: Primary | ICD-10-CM

## 2025-07-16 DIAGNOSIS — G47.09 OTHER INSOMNIA: ICD-10-CM

## 2025-07-16 LAB
QT INTERVAL: 394 MS
QTC INTERVAL: 443 MS

## 2025-07-16 RX ORDER — OLANZAPINE 5 MG/1
5 TABLET, FILM COATED ORAL NIGHTLY
Qty: 30 TABLET | Refills: 1 | Status: ON HOLD | OUTPATIENT
Start: 2025-07-16 | End: 2026-07-16

## 2025-07-16 RX ORDER — ESCITALOPRAM OXALATE 5 MG/1
5 TABLET ORAL DAILY
Qty: 30 TABLET | Refills: 1 | Status: ON HOLD | OUTPATIENT
Start: 2025-07-16

## 2025-07-16 NOTE — PROGRESS NOTES
"  Subjective     Jazzy Valle is a 49 y.o. female who presents today for initial evaluation     Chief Complaint: Paranoid schizophrenia       History of Present Illness:    History of Present Illness  Jazzy is a 49-year-old female who presents today for a follow-up visit with me.  Her father is present with her today and provides collateral information.  Jazzy and her father both agree that she has been doing very well and her father states that she is \"like a different girl.\"  Father states that she has been going to [place] with him and has been getting out of the house more.  Jazzy states that she has felt more motivated and interested in getting out of the house.  She will be having surgery next week due to cervical spondylosis and is hopeful that it will help with some of the issues with pain and numbness that she has been having.  Jazzy denies any hallucinations at this visit and denies any suicidal or homicidal thoughts.  Denies any side effects of the medications.  Feels like her depression and anxiety have been well controlled with the addition of Lexapro.  She is a little anxious about her upcoming surgery but states that it is manageable.  She is sleeping well.  Appetite is good.  No michelle or hypomania.  Denies any side effects of the medications.  Overall, she seems to be improving.  She has also reduced the number of cigarettes that she is smoking each day.         The following portions of the patient's history were reviewed and updated as appropriate: allergies, current medications, past family history, past medical history, past social history, past surgical history and problem list.    Past Psychiatric History:  Began Treatment: 10+ years ago  Diagnoses:Paranoid schizophrenia, bipolar disorder, insomnia, depression  Psychiatrist:Has seen RICARDO Crockett, Dr. Barone, Dr. Murphy,  Admission History:Has been in meeting to the [facility] a couple of times, denies any other inpatient " admissions.  Medication Trials:vraylar, seroquel, invega sustenna , risperdal, wellbutrin, abilify, haldol, lexapro, Zyprexa, Risperdal Consta, Latuda  Suicide Attempts:Denies    Past Medical History:  Past Medical History:   Diagnosis Date    Anxiety     Bipolar disorder     Depression     Hernia of abdominal wall     Hypertension     PCO (polycystic ovaries)     Psychosis     Schizoaffective disorder     Substance abuse        Substance Abuse History:   Previous meth use, opiate use and suboxone in the past.     Social History:  Social History     Socioeconomic History    Marital status: Single   Tobacco Use    Smoking status: Every Day     Current packs/day: 0.25     Average packs/day: 1 pack/day for 30.5 years (30.3 ttl pk-yrs)     Types: Cigarettes     Start date: 1995     Passive exposure: Current    Smokeless tobacco: Never    Tobacco comments:     1-2 cigarettes as of 7-   Vaping Use    Vaping status: Some Days    Substances: Nicotine, Flavoring    Devices: Disposable    Passive vaping exposure: Yes   Substance and Sexual Activity    Alcohol use: No    Drug use: Not Currently     Types: Methamphetamines    Sexual activity: Defer       Family History:  Family History   Problem Relation Age of Onset    No Known Problems Father     Breast cancer Neg Hx        Past Surgical History:  Past Surgical History:   Procedure Laterality Date    CERVICAL SPINE SURGERY  03/21/2009    ACDF C4-5- Dr. Mark Anthony Escobar    COLONOSCOPY      KNEE SURGERY Right 2014    scope       Problem List:  Patient Active Problem List   Diagnosis    Essential hypertension    Obesity (BMI 30-39.9)    Tobacco abuse    Chest pressure    Psychosis    Acute psychosis    Chronic residual schizophrenia with acute exacerbations    Methamphetamine use disorder, severe, dependence    Anemia, iron deficiency    Iron malabsorption    Cervical spondylosis with myelopathy       Allergy:   Allergies   Allergen Reactions    Abilify [Aripiprazole]  "Mental Status Change    Cymbalta [Duloxetine Hcl] Other (See Comments)     Hot all over.        Current Medications:   Current Outpatient Medications   Medication Sig Dispense Refill    escitalopram (Lexapro) 5 MG tablet Take 1 tablet by mouth Daily. 30 tablet 1    OLANZapine (ZyPREXA) 5 MG tablet Take 1 tablet by mouth Every Night. 30 tablet 1    atenolol (TENORMIN) 50 MG tablet Take 1 tablet by mouth Daily. (Patient taking differently: Take 0.5 tablets by mouth Daily.) 30 tablet 0    Chlorhexidine Gluconate 4 % solution Shower each day with solution for 5 days beginning 5 days before surgery. 120 mL 0    ferrous sulfate 325 (65 FE) MG tablet Take 1 tablet by mouth Daily With Breakfast. 30 tablet 2    mupirocin (BACTROBAN) 2 % nasal ointment Apply to the inside of each nostril with a cotton swab two times daily, morning and evening, for 5 days before surgery. 10 each 0     No current facility-administered medications for this visit.       Review of Systems:    Review of Systems   Constitutional:  Negative for activity change and appetite change.   Psychiatric/Behavioral:  Positive for stress. Negative for agitation, decreased concentration, dysphoric mood, suicidal ideas and depressed mood. The patient is not nervous/anxious.          Physical Exam:   Physical Exam  Vitals reviewed.   Neurological:      Mental Status: She is alert and oriented to person, place, and time. Mental status is at baseline.   Psychiatric:         Attention and Perception: Attention and perception normal.         Mood and Affect: Affect normal. Mood is not anxious.         Speech: Speech normal.         Behavior: Behavior normal. Behavior is cooperative.         Thought Content: Thought content normal.         Cognition and Memory: Cognition is impaired. Memory is impaired.         Judgment: Judgment is not impulsive.         Vitals:  Blood pressure 124/80, pulse 97, height 160 cm (63\"), weight 96.2 kg (212 lb), SpO2 97%, not currently " breastfeeding.   Body mass index is 37.55 kg/m².    Last 3 Blood Pressure Readings:  BP Readings from Last 3 Encounters:   07/16/25 124/80   06/10/25 114/72   05/20/25 132/84       PHQ-9 Score:   PHQ-9 Total Score: PHQ-9 Depression Screening  Little interest or pleasure in doing things? Not at all   Feeling down, depressed, or hopeless? Not at all   PHQ-2 Total Score 0   Trouble falling or staying asleep, or sleeping too much? Not at all   Feeling tired or having little energy? Not at all   Poor appetite or overeating? Not at all   Feeling bad about yourself - or that you are a failure or have let yourself or your family down? Not at all   Trouble concentrating on things, such as reading the newspaper or watching television? Not at all   Moving or speaking so slowly that other people could have noticed? Or the opposite - being so fidgety or restless that you have been moving around a lot more than usual? Not at all     Thoughts that you would be better off dead, or of hurting yourself in some way? Not at all   PHQ-9 Total Score 0   If you checked off any problems, how difficult have these problems made it for you to do your work, take care of things at home, or get along with other people? Not difficult at all             Mental Status Exam:   Hygiene:   fair  Cooperation:  Cooperative  Eye Contact:  Fair  Psychomotor Behavior:  Appropriate  Affect:  Restricted  Mood: anxious  Hopelessness: Denies  Speech:  Normal  Thought Process:  Linear  Thought Content:  Normal  Suicidal:  None  Homicidal:  None  Hallucinations:  None and Not demonstrated today  Delusion:  None  Memory:  Deficits  Orientation:  Person, Place, Time, and Situation  Reliability:  fair  Insight:  Poor  Judgement:  Fair  Impulse Control:  Impaired  Physical/Medical Issues:  Yes see [facility]       Lab Results:   Pre-Admission Testing on 07/14/2025   Component Date Value Ref Range Status    Hemoglobin A1C 07/14/2025 5.27  4.80 - 5.60 % Final    WBC  07/14/2025 7.47  3.40 - 10.80 10*3/mm3 Final    RBC 07/14/2025 4.11  3.77 - 5.28 10*6/mm3 Final    Hemoglobin 07/14/2025 11.6 (L)  12.0 - 15.9 g/dL Final    Hematocrit 07/14/2025 36.6  34.0 - 46.6 % Final    MCV 07/14/2025 89.1  79.0 - 97.0 fL Final    MCH 07/14/2025 28.2  26.6 - 33.0 pg Final    MCHC 07/14/2025 31.7  31.5 - 35.7 g/dL Final    RDW 07/14/2025 16.6 (H)  12.3 - 15.4 % Final    RDW-SD 07/14/2025 53.9  37.0 - 54.0 fl Final    MPV 07/14/2025 11.2  6.0 - 12.0 fL Final    Platelets 07/14/2025 206  140 - 450 10*3/mm3 Final    Potassium 07/14/2025 3.8  3.5 - 5.2 mmol/L Final    QT Interval 07/14/2025 394  ms Preliminary    QTC Interval 07/14/2025 443  ms Preliminary    MRSA Screen Cx 07/14/2025 No Methicillin Resistant Staphylococcus aureus isolated   Final   Lab on 04/17/2025   Component Date Value Ref Range Status    Iron 04/17/2025 46  37 - 145 mcg/dL Final    Iron Saturation (TSAT) 04/17/2025 11 (L)  20 - 50 % Final    Transferrin 04/17/2025 271  200 - 360 mg/dL Final    TIBC 04/17/2025 404  298 - 536 mcg/dL Final    WBC 04/17/2025 7.59  3.40 - 10.80 10*3/mm3 Final    RBC 04/17/2025 4.06  3.77 - 5.28 10*6/mm3 Final    Hemoglobin 04/17/2025 10.7 (L)  12.0 - 15.9 g/dL Final    Hematocrit 04/17/2025 35.9  34.0 - 46.6 % Final    MCV 04/17/2025 88.4  79.0 - 97.0 fL Final    MCH 04/17/2025 26.4 (L)  26.6 - 33.0 pg Final    MCHC 04/17/2025 29.8 (L)  31.5 - 35.7 g/dL Final    RDW 04/17/2025 23.8 (H)  12.3 - 15.4 % Final    RDW-SD 04/17/2025 76.8 (H)  37.0 - 54.0 fl Final    MPV 04/17/2025 11.3  6.0 - 12.0 fL Final    Platelets 04/17/2025 259  140 - 450 10*3/mm3 Final    Neutrophil % 04/17/2025 59.3  42.7 - 76.0 % Final    Lymphocyte % 04/17/2025 33.1  19.6 - 45.3 % Final    Monocyte % 04/17/2025 6.5  5.0 - 12.0 % Final    Eosinophil % 04/17/2025 0.0 (L)  0.3 - 6.2 % Final    Basophil % 04/17/2025 0.7  0.0 - 1.5 % Final    Immature Grans % 04/17/2025 0.4  0.0 - 0.5 % Final    Neutrophils, Absolute 04/17/2025 4.51   1.70 - 7.00 10*3/mm3 Final    Lymphocytes, Absolute 04/17/2025 2.51  0.70 - 3.10 10*3/mm3 Final    Monocytes, Absolute 04/17/2025 0.49  0.10 - 0.90 10*3/mm3 Final    Eosinophils, Absolute 04/17/2025 0.00  0.00 - 0.40 10*3/mm3 Final    Basophils, Absolute 04/17/2025 0.05  0.00 - 0.20 10*3/mm3 Final    Immature Grans, Absolute 04/17/2025 0.03  0.00 - 0.05 10*3/mm3 Final    nRBC 04/17/2025 0.0  0.0 - 0.2 /100 WBC Final         Assessment & Plan   Diagnoses and all orders for this visit:    1. Tobacco use (Primary)    2. Moderate episode of recurrent major depressive disorder  -     OLANZapine (ZyPREXA) 5 MG tablet; Take 1 tablet by mouth Every Night.  Dispense: 30 tablet; Refill: 1    3. Paranoid schizophrenia  -     OLANZapine (ZyPREXA) 5 MG tablet; Take 1 tablet by mouth Every Night.  Dispense: 30 tablet; Refill: 1    4. Other insomnia  -     OLANZapine (ZyPREXA) 5 MG tablet; Take 1 tablet by mouth Every Night.  Dispense: 30 tablet; Refill: 1    Other orders  -     escitalopram (Lexapro) 5 MG tablet; Take 1 tablet by mouth Daily.  Dispense: 30 tablet; Refill: 1            Visit Diagnoses:    ICD-10-CM ICD-9-CM   1. Tobacco use  Z72.0 305.1   2. Moderate episode of recurrent major depressive disorder  F33.1 296.32   3. Paranoid schizophrenia  F20.0 295.30   4. Other insomnia  G47.09 780.52           GOALS:  Short Term Goals: Patient will be compliant with medication, and patient will have no significant medication related side effects.  Patient will be engaged in psychotherapy as indicated.  Patient will report subjective improvement of symptoms.  Long term goals: To stabilize mood and treat/improve subjective symptoms, the patient will stay out of the hospital, the patient will be at an optimal level of functioning, and the patient will take all medications as prescribed.  The patient/guardian verbalized understanding and agreement with goals that were mutually set.      TREATMENT PLAN: Continue supportive  psychotherapy efforts and medications as indicated.  Pharmacological and Non-Pharmacological treatment options discussed during today's visit. Patient/Guardian acknowledged and verbally consented with current treatment plan and was educated on the importance of compliance with treatment and follow-up appointments.      MEDICATION ISSUES:  Discussed medication options and treatment plan of prescribed medication as well as the risks, benefits, any black box warnings, and side effects including potential falls, possible impaired driving, and metabolic adversities among others. Patient is agreeable to call the office with any worsening of symptoms or onset of side effects, or if any concerns or questions arise.  The contact information for the office is made available to the patient. Patient is agreeable to call 911 or go to the nearest ER should they begin having any SI/HI, or if any urgent concerns arise. No medication side effects or related complaints today.     MEDS ORDERED DURING VISIT:  New Medications Ordered This Visit   Medications    escitalopram (Lexapro) 5 MG tablet     Sig: Take 1 tablet by mouth Daily.     Dispense:  30 tablet     Refill:  1    OLANZapine (ZyPREXA) 5 MG tablet     Sig: Take 1 tablet by mouth Every Night.     Dispense:  30 tablet     Refill:  1     Plan:  - Continue Zyprexa 5 mg by mouth every night.  - Continue Lexapro 5 mg by mouth daily.  - Patient verbalizes understanding to go to nearest ED if SI/HI develop.  -Patient verbalizes understanding of the risks and benefits of taking this medication.  Patient discusses that she does better with p.o. medications versus [facility]'s.   Follow Up Appointment:   Return in about 2 months (around 9/16/2025).             This document has been electronically signed by RICARDO Aleman  July 16, 2025 10:59 EDT    Dictated Utilizing Dragon Dictation: Part of this note may be an electronic transcription/translation of spoken language to printed text  using the Dragon Dictation System.

## 2025-07-18 ENCOUNTER — PREP FOR SURGERY (OUTPATIENT)
Dept: OTHER | Facility: HOSPITAL | Age: 50
End: 2025-07-18
Payer: MEDICARE

## 2025-07-18 DIAGNOSIS — M47.12 CERVICAL SPONDYLOSIS WITH MYELOPATHY: Primary | ICD-10-CM

## 2025-07-20 ENCOUNTER — ANESTHESIA EVENT (OUTPATIENT)
Dept: PERIOP | Facility: HOSPITAL | Age: 50
End: 2025-07-20
Payer: MEDICARE

## 2025-07-20 RX ORDER — FAMOTIDINE 10 MG/ML
20 INJECTION, SOLUTION INTRAVENOUS ONCE
Status: CANCELLED | OUTPATIENT
Start: 2025-07-20 | End: 2025-07-20

## 2025-07-21 ENCOUNTER — ANESTHESIA (OUTPATIENT)
Dept: PERIOP | Facility: HOSPITAL | Age: 50
End: 2025-07-21
Payer: MEDICARE

## 2025-07-21 ENCOUNTER — APPOINTMENT (OUTPATIENT)
Dept: GENERAL RADIOLOGY | Facility: HOSPITAL | Age: 50
End: 2025-07-21
Payer: MEDICARE

## 2025-07-21 ENCOUNTER — HOSPITAL ENCOUNTER (INPATIENT)
Facility: HOSPITAL | Age: 50
LOS: 3 days | Discharge: HOME | End: 2025-07-24
Attending: NEUROLOGICAL SURGERY | Admitting: NEUROLOGICAL SURGERY
Payer: MEDICARE

## 2025-07-21 DIAGNOSIS — M47.12 CERVICAL SPONDYLOSIS WITH MYELOPATHY: ICD-10-CM

## 2025-07-21 DIAGNOSIS — M43.02 CERVICAL SPONDYLOLYSIS: Primary | ICD-10-CM

## 2025-07-21 LAB
B-HCG UR QL: NEGATIVE
EXPIRATION DATE: NORMAL
INTERNAL NEGATIVE CONTROL: NORMAL
INTERNAL POSITIVE CONTROL: NORMAL
Lab: NORMAL

## 2025-07-21 PROCEDURE — 0RG6071 FUSION OF THORACIC VERTEBRAL JOINT WITH AUTOLOGOUS TISSUE SUBSTITUTE, POSTERIOR APPROACH, POSTERIOR COLUMN, OPEN APPROACH: ICD-10-PCS | Performed by: NEUROLOGICAL SURGERY

## 2025-07-21 PROCEDURE — 22843 INSERT SPINE FIXATION DEVICE: CPT | Performed by: NEUROLOGICAL SURGERY

## 2025-07-21 PROCEDURE — 25810000003 LACTATED RINGERS PER 1000 ML: Performed by: ANESTHESIOLOGY

## 2025-07-21 PROCEDURE — 22614 ARTHRD PST TQ 1NTRSPC EA ADD: CPT | Performed by: PHYSICIAN ASSISTANT

## 2025-07-21 PROCEDURE — C1713 ANCHOR/SCREW BN/BN,TIS/BN: HCPCS | Performed by: NEUROLOGICAL SURGERY

## 2025-07-21 PROCEDURE — 25010000002 VANCOMYCIN 1 G RECONSTITUTED SOLUTION: Performed by: NEUROLOGICAL SURGERY

## 2025-07-21 PROCEDURE — 25010000002 FENTANYL CITRATE (PF) 50 MCG/ML SOLUTION

## 2025-07-21 PROCEDURE — 22843 INSERT SPINE FIXATION DEVICE: CPT | Performed by: PHYSICIAN ASSISTANT

## 2025-07-21 PROCEDURE — 22600 ARTHRD PST TQ 1NTRSPC CRV: CPT | Performed by: PHYSICIAN ASSISTANT

## 2025-07-21 PROCEDURE — 25010000002 CEFAZOLIN PER 500 MG: Performed by: NEUROLOGICAL SURGERY

## 2025-07-21 PROCEDURE — 25010000002 VASOPRESSIN 20 UNIT/ML SOLUTION

## 2025-07-21 PROCEDURE — 0PH404Z INSERTION OF INTERNAL FIXATION DEVICE INTO THORACIC VERTEBRA, OPEN APPROACH: ICD-10-PCS | Performed by: NEUROLOGICAL SURGERY

## 2025-07-21 PROCEDURE — 25010000002 LIDOCAINE PF 1% 1 % SOLUTION: Performed by: ANESTHESIOLOGY

## 2025-07-21 PROCEDURE — 0PH304Z INSERTION OF INTERNAL FIXATION DEVICE INTO CERVICAL VERTEBRA, OPEN APPROACH: ICD-10-PCS | Performed by: NEUROLOGICAL SURGERY

## 2025-07-21 PROCEDURE — A9270 NON-COVERED ITEM OR SERVICE: HCPCS | Performed by: ANESTHESIOLOGY

## 2025-07-21 PROCEDURE — 25810000003 LACTATED RINGERS PER 1000 ML: Performed by: NEUROLOGICAL SURGERY

## 2025-07-21 PROCEDURE — 0RG4071 FUSION OF CERVICOTHORACIC VERTEBRAL JOINT WITH AUTOLOGOUS TISSUE SUBSTITUTE, POSTERIOR APPROACH, POSTERIOR COLUMN, OPEN APPROACH: ICD-10-PCS | Performed by: NEUROLOGICAL SURGERY

## 2025-07-21 PROCEDURE — 25010000002 ONDANSETRON PER 1 MG

## 2025-07-21 PROCEDURE — 81025 URINE PREGNANCY TEST: CPT | Performed by: ANESTHESIOLOGY

## 2025-07-21 PROCEDURE — 25010000002 LIDOCAINE PF 1% 1 % SOLUTION

## 2025-07-21 PROCEDURE — 25010000002 HYDROMORPHONE PER 4 MG

## 2025-07-21 PROCEDURE — 63015 REMOVE SPINE LAMINA >2 CRVCL: CPT | Performed by: NEUROLOGICAL SURGERY

## 2025-07-21 PROCEDURE — 63710000001 FAMOTIDINE 20 MG TABLET: Performed by: ANESTHESIOLOGY

## 2025-07-21 PROCEDURE — 25010000002 BUPIVACAINE LIPOSOME 1.3 % SUSPENSION: Performed by: NEUROLOGICAL SURGERY

## 2025-07-21 PROCEDURE — 63015 REMOVE SPINE LAMINA >2 CRVCL: CPT | Performed by: PHYSICIAN ASSISTANT

## 2025-07-21 PROCEDURE — 00NW0ZZ RELEASE CERVICAL SPINAL CORD, OPEN APPROACH: ICD-10-PCS | Performed by: NEUROLOGICAL SURGERY

## 2025-07-21 PROCEDURE — P9612 CATHETERIZE FOR URINE SPEC: HCPCS

## 2025-07-21 PROCEDURE — 61783 SCAN PROC SPINAL: CPT | Performed by: NEUROLOGICAL SURGERY

## 2025-07-21 PROCEDURE — 25010000002 BUPIVACAINE LIPOSOME 1.3 % SUSPENSION 20 ML VIAL: Performed by: NEUROLOGICAL SURGERY

## 2025-07-21 PROCEDURE — 0RG2071 FUSION OF 2 OR MORE CERVICAL VERTEBRAL JOINTS WITH AUTOLOGOUS TISSUE SUBSTITUTE, POSTERIOR APPROACH, POSTERIOR COLUMN, OPEN APPROACH: ICD-10-PCS | Performed by: NEUROLOGICAL SURGERY

## 2025-07-21 PROCEDURE — 25010000002 FENTANYL CITRATE (PF) 100 MCG/2ML SOLUTION

## 2025-07-21 PROCEDURE — 25010000002 SUGAMMADEX 200 MG/2ML SOLUTION

## 2025-07-21 PROCEDURE — 25010000002 DEXAMETHASONE SODIUM PHOSPHATE 10 MG/ML SOLUTION: Performed by: NEUROLOGICAL SURGERY

## 2025-07-21 PROCEDURE — 22614 ARTHRD PST TQ 1NTRSPC EA ADD: CPT | Performed by: NEUROLOGICAL SURGERY

## 2025-07-21 PROCEDURE — 22600 ARTHRD PST TQ 1NTRSPC CRV: CPT | Performed by: NEUROLOGICAL SURGERY

## 2025-07-21 DEVICE — ULTI AXIAL SCREW 3603532 3.5 X 32MM
Type: IMPLANTABLE DEVICE | Site: NECK | Status: FUNCTIONAL
Brand: INFINITY™ OCCIPITOCERVICAL UPPER THORACIC SYSTEM

## 2025-07-21 DEVICE — MAGNETOS FLEX MATRIX  10.08CC 0.25-1MM LARGE
Type: IMPLANTABLE DEVICE | Site: NECK | Status: FUNCTIONAL
Brand: MAGNETOS

## 2025-07-21 DEVICE — SSC BONE WAX
Type: IMPLANTABLE DEVICE | Site: NECK | Status: FUNCTIONAL
Brand: SSC BONE WAX

## 2025-07-21 DEVICE — IMPLANTABLE DEVICE
Type: IMPLANTABLE DEVICE | Site: SPINE CERVICAL | Status: FUNCTIONAL
Brand: SURGIFOAM® ABSORBABLE GELATIN SPONGE, U.S.P.

## 2025-07-21 DEVICE — FLOSEAL WITH RECOTHROM - 10ML.
Type: IMPLANTABLE DEVICE | Site: SPINE CERVICAL | Status: FUNCTIONAL
Brand: FLOSEAL HEMOSTATIC MATRIX

## 2025-07-21 RX ORDER — PROMETHAZINE HYDROCHLORIDE 25 MG/1
25 TABLET ORAL ONCE AS NEEDED
Status: DISCONTINUED | OUTPATIENT
Start: 2025-07-21 | End: 2025-07-21 | Stop reason: HOSPADM

## 2025-07-21 RX ORDER — DIPHENHYDRAMINE HCL 25 MG
25 CAPSULE ORAL NIGHTLY PRN
Status: DISCONTINUED | OUTPATIENT
Start: 2025-07-21 | End: 2025-07-24 | Stop reason: HOSPADM

## 2025-07-21 RX ORDER — DEXAMETHASONE SODIUM PHOSPHATE 10 MG/ML
10 INJECTION, SOLUTION INTRAMUSCULAR; INTRAVENOUS
Status: COMPLETED | OUTPATIENT
Start: 2025-07-21 | End: 2025-07-21

## 2025-07-21 RX ORDER — SODIUM CHLORIDE 0.9 % (FLUSH) 0.9 %
3 SYRINGE (ML) INJECTION EVERY 12 HOURS SCHEDULED
Status: DISCONTINUED | OUTPATIENT
Start: 2025-07-21 | End: 2025-07-21 | Stop reason: HOSPADM

## 2025-07-21 RX ORDER — SODIUM CHLORIDE, SODIUM LACTATE, POTASSIUM CHLORIDE, CALCIUM CHLORIDE 600; 310; 30; 20 MG/100ML; MG/100ML; MG/100ML; MG/100ML
9 INJECTION, SOLUTION INTRAVENOUS CONTINUOUS
Status: ACTIVE | OUTPATIENT
Start: 2025-07-22 | End: 2025-07-22

## 2025-07-21 RX ORDER — MAGNESIUM HYDROXIDE 1200 MG/15ML
LIQUID ORAL AS NEEDED
Status: DISCONTINUED | OUTPATIENT
Start: 2025-07-21 | End: 2025-07-21 | Stop reason: HOSPADM

## 2025-07-21 RX ORDER — SODIUM CHLORIDE 0.9 % (FLUSH) 0.9 %
10 SYRINGE (ML) INJECTION AS NEEDED
Status: DISCONTINUED | OUTPATIENT
Start: 2025-07-21 | End: 2025-07-24 | Stop reason: HOSPADM

## 2025-07-21 RX ORDER — ACETAMINOPHEN 160 MG/5ML
650 SOLUTION ORAL EVERY 4 HOURS PRN
Status: DISCONTINUED | OUTPATIENT
Start: 2025-07-21 | End: 2025-07-24 | Stop reason: HOSPADM

## 2025-07-21 RX ORDER — SODIUM CHLORIDE, SODIUM LACTATE, POTASSIUM CHLORIDE, CALCIUM CHLORIDE 600; 310; 30; 20 MG/100ML; MG/100ML; MG/100ML; MG/100ML
90 INJECTION, SOLUTION INTRAVENOUS CONTINUOUS
Status: DISCONTINUED | OUTPATIENT
Start: 2025-07-21 | End: 2025-07-22

## 2025-07-21 RX ORDER — BISACODYL 10 MG
10 SUPPOSITORY, RECTAL RECTAL DAILY PRN
Status: DISCONTINUED | OUTPATIENT
Start: 2025-07-21 | End: 2025-07-24 | Stop reason: HOSPADM

## 2025-07-21 RX ORDER — ESCITALOPRAM OXALATE 5 MG/1
5 TABLET ORAL DAILY
Status: DISCONTINUED | OUTPATIENT
Start: 2025-07-21 | End: 2025-07-24 | Stop reason: HOSPADM

## 2025-07-21 RX ORDER — LIDOCAINE HYDROCHLORIDE 10 MG/ML
INJECTION, SOLUTION EPIDURAL; INFILTRATION; INTRACAUDAL; PERINEURAL AS NEEDED
Status: DISCONTINUED | OUTPATIENT
Start: 2025-07-21 | End: 2025-07-21 | Stop reason: SURG

## 2025-07-21 RX ORDER — NALOXONE HCL 0.4 MG/ML
0.4 VIAL (ML) INJECTION AS NEEDED
Status: DISCONTINUED | OUTPATIENT
Start: 2025-07-21 | End: 2025-07-21 | Stop reason: HOSPADM

## 2025-07-21 RX ORDER — SODIUM CHLORIDE 0.9 % (FLUSH) 0.9 %
10 SYRINGE (ML) INJECTION EVERY 12 HOURS SCHEDULED
Status: DISCONTINUED | OUTPATIENT
Start: 2025-07-21 | End: 2025-07-21 | Stop reason: HOSPADM

## 2025-07-21 RX ORDER — SODIUM CHLORIDE 0.9 % (FLUSH) 0.9 %
3-10 SYRINGE (ML) INJECTION AS NEEDED
Status: DISCONTINUED | OUTPATIENT
Start: 2025-07-21 | End: 2025-07-21 | Stop reason: HOSPADM

## 2025-07-21 RX ORDER — VANCOMYCIN HYDROCHLORIDE 1 G/20ML
INJECTION, POWDER, LYOPHILIZED, FOR SOLUTION INTRAVENOUS AS NEEDED
Status: DISCONTINUED | OUTPATIENT
Start: 2025-07-21 | End: 2025-07-21 | Stop reason: HOSPADM

## 2025-07-21 RX ORDER — LIDOCAINE HYDROCHLORIDE 40 MG/ML
SOLUTION TOPICAL AS NEEDED
Status: DISCONTINUED | OUTPATIENT
Start: 2025-07-21 | End: 2025-07-21 | Stop reason: SURG

## 2025-07-21 RX ORDER — VASOPRESSIN 20 [USP'U]/ML
INJECTION, SOLUTION INTRAVENOUS AS NEEDED
Status: DISCONTINUED | OUTPATIENT
Start: 2025-07-21 | End: 2025-07-21 | Stop reason: SURG

## 2025-07-21 RX ORDER — HYDROCODONE BITARTRATE AND ACETAMINOPHEN 5; 325 MG/1; MG/1
1 TABLET ORAL ONCE AS NEEDED
Status: DISCONTINUED | OUTPATIENT
Start: 2025-07-21 | End: 2025-07-21 | Stop reason: HOSPADM

## 2025-07-21 RX ORDER — FERROUS SULFATE 325(65) MG
325 TABLET ORAL
Status: DISCONTINUED | OUTPATIENT
Start: 2025-07-22 | End: 2025-07-24 | Stop reason: HOSPADM

## 2025-07-21 RX ORDER — AMOXICILLIN 250 MG
2 CAPSULE ORAL 2 TIMES DAILY PRN
Status: DISCONTINUED | OUTPATIENT
Start: 2025-07-21 | End: 2025-07-24 | Stop reason: HOSPADM

## 2025-07-21 RX ORDER — MORPHINE SULFATE 2 MG/ML
1 INJECTION, SOLUTION INTRAMUSCULAR; INTRAVENOUS
Status: DISCONTINUED | OUTPATIENT
Start: 2025-07-21 | End: 2025-07-24 | Stop reason: HOSPADM

## 2025-07-21 RX ORDER — SODIUM CHLORIDE 0.9 % (FLUSH) 0.9 %
10 SYRINGE (ML) INJECTION AS NEEDED
Status: DISCONTINUED | OUTPATIENT
Start: 2025-07-21 | End: 2025-07-21 | Stop reason: HOSPADM

## 2025-07-21 RX ORDER — FENTANYL CITRATE 50 UG/ML
INJECTION, SOLUTION INTRAMUSCULAR; INTRAVENOUS
Status: COMPLETED
Start: 2025-07-21 | End: 2025-07-21

## 2025-07-21 RX ORDER — SUCCINYLCHOLINE/SOD CL,ISO/PF 200MG/10ML
SYRINGE (ML) INTRAVENOUS AS NEEDED
Status: DISCONTINUED | OUTPATIENT
Start: 2025-07-21 | End: 2025-07-21 | Stop reason: SURG

## 2025-07-21 RX ORDER — BISACODYL 5 MG/1
5 TABLET, DELAYED RELEASE ORAL DAILY PRN
Status: DISCONTINUED | OUTPATIENT
Start: 2025-07-21 | End: 2025-07-24 | Stop reason: HOSPADM

## 2025-07-21 RX ORDER — HYDROCODONE BITARTRATE AND ACETAMINOPHEN 7.5; 325 MG/1; MG/1
1 TABLET ORAL EVERY 4 HOURS PRN
Status: DISCONTINUED | OUTPATIENT
Start: 2025-07-21 | End: 2025-07-21 | Stop reason: HOSPADM

## 2025-07-21 RX ORDER — FAMOTIDINE 20 MG/1
20 TABLET, FILM COATED ORAL
Status: DISCONTINUED | OUTPATIENT
Start: 2025-07-21 | End: 2025-07-21

## 2025-07-21 RX ORDER — LABETALOL HYDROCHLORIDE 5 MG/ML
5 INJECTION, SOLUTION INTRAVENOUS
Status: DISCONTINUED | OUTPATIENT
Start: 2025-07-21 | End: 2025-07-21 | Stop reason: HOSPADM

## 2025-07-21 RX ORDER — PROPOFOL 10 MG/ML
VIAL (ML) INTRAVENOUS AS NEEDED
Status: DISCONTINUED | OUTPATIENT
Start: 2025-07-21 | End: 2025-07-21 | Stop reason: SURG

## 2025-07-21 RX ORDER — SODIUM CHLORIDE 9 MG/ML
9 INJECTION, SOLUTION INTRAVENOUS AS NEEDED
Status: DISCONTINUED | OUTPATIENT
Start: 2025-07-21 | End: 2025-07-21 | Stop reason: HOSPADM

## 2025-07-21 RX ORDER — DROPERIDOL 2.5 MG/ML
0.62 INJECTION, SOLUTION INTRAMUSCULAR; INTRAVENOUS ONCE AS NEEDED
Status: DISCONTINUED | OUTPATIENT
Start: 2025-07-21 | End: 2025-07-21 | Stop reason: HOSPADM

## 2025-07-21 RX ORDER — ACETAMINOPHEN 325 MG/1
650 TABLET ORAL EVERY 4 HOURS PRN
Status: DISCONTINUED | OUTPATIENT
Start: 2025-07-21 | End: 2025-07-24 | Stop reason: HOSPADM

## 2025-07-21 RX ORDER — SODIUM CHLORIDE, SODIUM LACTATE, POTASSIUM CHLORIDE, CALCIUM CHLORIDE 600; 310; 30; 20 MG/100ML; MG/100ML; MG/100ML; MG/100ML
9 INJECTION, SOLUTION INTRAVENOUS CONTINUOUS
Status: ACTIVE | OUTPATIENT
Start: 2025-07-21 | End: 2025-07-22

## 2025-07-21 RX ORDER — MIDAZOLAM HYDROCHLORIDE 1 MG/ML
1 INJECTION, SOLUTION INTRAMUSCULAR; INTRAVENOUS
Status: DISCONTINUED | OUTPATIENT
Start: 2025-07-21 | End: 2025-07-21 | Stop reason: HOSPADM

## 2025-07-21 RX ORDER — IPRATROPIUM BROMIDE AND ALBUTEROL SULFATE 2.5; .5 MG/3ML; MG/3ML
3 SOLUTION RESPIRATORY (INHALATION) ONCE AS NEEDED
Status: DISCONTINUED | OUTPATIENT
Start: 2025-07-21 | End: 2025-07-21 | Stop reason: HOSPADM

## 2025-07-21 RX ORDER — LIDOCAINE HYDROCHLORIDE 10 MG/ML
0.5 INJECTION, SOLUTION EPIDURAL; INFILTRATION; INTRACAUDAL; PERINEURAL ONCE AS NEEDED
Status: COMPLETED | OUTPATIENT
Start: 2025-07-21 | End: 2025-07-21

## 2025-07-21 RX ORDER — NALOXONE HCL 0.4 MG/ML
0.4 VIAL (ML) INJECTION
Status: DISCONTINUED | OUTPATIENT
Start: 2025-07-21 | End: 2025-07-24 | Stop reason: HOSPADM

## 2025-07-21 RX ORDER — OXYCODONE AND ACETAMINOPHEN 7.5; 325 MG/1; MG/1
1 TABLET ORAL EVERY 4 HOURS PRN
Refills: 0 | Status: DISCONTINUED | OUTPATIENT
Start: 2025-07-21 | End: 2025-07-24 | Stop reason: HOSPADM

## 2025-07-21 RX ORDER — SODIUM CHLORIDE 9 MG/ML
40 INJECTION, SOLUTION INTRAVENOUS AS NEEDED
Status: DISCONTINUED | OUTPATIENT
Start: 2025-07-21 | End: 2025-07-24 | Stop reason: HOSPADM

## 2025-07-21 RX ORDER — NICOTINE 21 MG/24HR
1 PATCH, TRANSDERMAL 24 HOURS TRANSDERMAL EVERY 24 HOURS
Status: DISCONTINUED | OUTPATIENT
Start: 2025-07-21 | End: 2025-07-24 | Stop reason: HOSPADM

## 2025-07-21 RX ORDER — FAMOTIDINE 20 MG/1
20 TABLET, FILM COATED ORAL ONCE
Status: COMPLETED | OUTPATIENT
Start: 2025-07-21 | End: 2025-07-21

## 2025-07-21 RX ORDER — PROMETHAZINE HYDROCHLORIDE 25 MG/1
25 SUPPOSITORY RECTAL ONCE AS NEEDED
Status: DISCONTINUED | OUTPATIENT
Start: 2025-07-21 | End: 2025-07-21 | Stop reason: HOSPADM

## 2025-07-21 RX ORDER — REMIFENTANIL HYDROCHLORIDE 1 MG/ML
INJECTION, POWDER, LYOPHILIZED, FOR SOLUTION INTRAVENOUS CONTINUOUS PRN
Status: DISCONTINUED | OUTPATIENT
Start: 2025-07-21 | End: 2025-07-21 | Stop reason: SURG

## 2025-07-21 RX ORDER — ATENOLOL 25 MG/1
25 TABLET ORAL DAILY
Status: DISCONTINUED | OUTPATIENT
Start: 2025-07-21 | End: 2025-07-24 | Stop reason: HOSPADM

## 2025-07-21 RX ORDER — HYDROCODONE BITARTRATE AND ACETAMINOPHEN 5; 325 MG/1; MG/1
1 TABLET ORAL EVERY 4 HOURS PRN
Refills: 0 | Status: DISCONTINUED | OUTPATIENT
Start: 2025-07-21 | End: 2025-07-24 | Stop reason: HOSPADM

## 2025-07-21 RX ORDER — ROCURONIUM BROMIDE 10 MG/ML
INJECTION, SOLUTION INTRAVENOUS AS NEEDED
Status: DISCONTINUED | OUTPATIENT
Start: 2025-07-21 | End: 2025-07-21 | Stop reason: SURG

## 2025-07-21 RX ORDER — FENTANYL CITRATE 50 UG/ML
INJECTION, SOLUTION INTRAMUSCULAR; INTRAVENOUS AS NEEDED
Status: DISCONTINUED | OUTPATIENT
Start: 2025-07-21 | End: 2025-07-21 | Stop reason: SURG

## 2025-07-21 RX ORDER — FENTANYL CITRATE 50 UG/ML
50 INJECTION, SOLUTION INTRAMUSCULAR; INTRAVENOUS
Status: DISCONTINUED | OUTPATIENT
Start: 2025-07-21 | End: 2025-07-21 | Stop reason: HOSPADM

## 2025-07-21 RX ORDER — HYDRALAZINE HYDROCHLORIDE 20 MG/ML
5 INJECTION INTRAMUSCULAR; INTRAVENOUS
Status: DISCONTINUED | OUTPATIENT
Start: 2025-07-21 | End: 2025-07-21 | Stop reason: HOSPADM

## 2025-07-21 RX ORDER — FAMOTIDINE 20 MG/1
20 TABLET, FILM COATED ORAL EVERY 12 HOURS SCHEDULED
Status: DISCONTINUED | OUTPATIENT
Start: 2025-07-21 | End: 2025-07-24 | Stop reason: HOSPADM

## 2025-07-21 RX ORDER — HYDROMORPHONE HYDROCHLORIDE 1 MG/ML
0.5 INJECTION, SOLUTION INTRAMUSCULAR; INTRAVENOUS; SUBCUTANEOUS
Status: DISCONTINUED | OUTPATIENT
Start: 2025-07-21 | End: 2025-07-21 | Stop reason: HOSPADM

## 2025-07-21 RX ORDER — DEXMEDETOMIDINE HYDROCHLORIDE 100 UG/ML
INJECTION, SOLUTION INTRAVENOUS AS NEEDED
Status: DISCONTINUED | OUTPATIENT
Start: 2025-07-21 | End: 2025-07-21 | Stop reason: SURG

## 2025-07-21 RX ORDER — ONDANSETRON 2 MG/ML
4 INJECTION INTRAMUSCULAR; INTRAVENOUS ONCE AS NEEDED
Status: DISCONTINUED | OUTPATIENT
Start: 2025-07-21 | End: 2025-07-21 | Stop reason: HOSPADM

## 2025-07-21 RX ORDER — DROPERIDOL 2.5 MG/ML
0.62 INJECTION, SOLUTION INTRAMUSCULAR; INTRAVENOUS
Status: DISCONTINUED | OUTPATIENT
Start: 2025-07-21 | End: 2025-07-21 | Stop reason: HOSPADM

## 2025-07-21 RX ORDER — ONDANSETRON 2 MG/ML
4 INJECTION INTRAMUSCULAR; INTRAVENOUS EVERY 6 HOURS PRN
Status: DISCONTINUED | OUTPATIENT
Start: 2025-07-21 | End: 2025-07-24 | Stop reason: HOSPADM

## 2025-07-21 RX ORDER — OLANZAPINE 5 MG/1
5 TABLET, FILM COATED ORAL NIGHTLY
Status: DISCONTINUED | OUTPATIENT
Start: 2025-07-21 | End: 2025-07-24 | Stop reason: HOSPADM

## 2025-07-21 RX ORDER — ONDANSETRON 2 MG/ML
INJECTION INTRAMUSCULAR; INTRAVENOUS AS NEEDED
Status: DISCONTINUED | OUTPATIENT
Start: 2025-07-21 | End: 2025-07-21 | Stop reason: SURG

## 2025-07-21 RX ORDER — POLYETHYLENE GLYCOL 3350 17 G/17G
17 POWDER, FOR SOLUTION ORAL DAILY PRN
Status: DISCONTINUED | OUTPATIENT
Start: 2025-07-21 | End: 2025-07-24 | Stop reason: HOSPADM

## 2025-07-21 RX ORDER — SODIUM CHLORIDE 0.9 % (FLUSH) 0.9 %
3 SYRINGE (ML) INJECTION EVERY 12 HOURS SCHEDULED
Status: DISCONTINUED | OUTPATIENT
Start: 2025-07-21 | End: 2025-07-24 | Stop reason: HOSPADM

## 2025-07-21 RX ADMIN — OXYCODONE HYDROCHLORIDE AND ACETAMINOPHEN 1 TABLET: 7.5; 325 TABLET ORAL at 21:37

## 2025-07-21 RX ADMIN — Medication 250 MG: at 09:45

## 2025-07-21 RX ADMIN — SODIUM CHLORIDE, POTASSIUM CHLORIDE, SODIUM LACTATE AND CALCIUM CHLORIDE: 600; 310; 30; 20 INJECTION, SOLUTION INTRAVENOUS at 14:21

## 2025-07-21 RX ADMIN — SUGAMMADEX 100 MG: 100 INJECTION, SOLUTION INTRAVENOUS at 10:04

## 2025-07-21 RX ADMIN — LIDOCAINE HYDROCHLORIDE 50 MG: 10 INJECTION, SOLUTION EPIDURAL; INFILTRATION; INTRACAUDAL; PERINEURAL at 09:45

## 2025-07-21 RX ADMIN — SODIUM CHLORIDE, POTASSIUM CHLORIDE, SODIUM LACTATE AND CALCIUM CHLORIDE 9 ML/HR: 600; 310; 30; 20 INJECTION, SOLUTION INTRAVENOUS at 09:03

## 2025-07-21 RX ADMIN — SODIUM CHLORIDE 2 G: 900 INJECTION INTRAVENOUS at 13:40

## 2025-07-21 RX ADMIN — Medication 50 MG: at 10:11

## 2025-07-21 RX ADMIN — FENTANYL CITRATE 50 MCG: 50 INJECTION, SOLUTION INTRAMUSCULAR; INTRAVENOUS at 14:31

## 2025-07-21 RX ADMIN — Medication 50 MG: at 09:49

## 2025-07-21 RX ADMIN — Medication 50 MG: at 10:02

## 2025-07-21 RX ADMIN — VASOPRESSIN 2 UNITS: 20 INJECTION INTRAVENOUS at 12:35

## 2025-07-21 RX ADMIN — ONDANSETRON 4 MG: 2 INJECTION, SOLUTION INTRAMUSCULAR; INTRAVENOUS at 13:59

## 2025-07-21 RX ADMIN — Medication 50 MG: at 09:51

## 2025-07-21 RX ADMIN — Medication 50 MG: at 10:05

## 2025-07-21 RX ADMIN — SODIUM CHLORIDE 2000 MG: 900 INJECTION INTRAVENOUS at 20:08

## 2025-07-21 RX ADMIN — DEXMEDETOMIDINE HYDROCHLORIDE 40 MCG: 100 INJECTION, SOLUTION INTRAVENOUS at 10:20

## 2025-07-21 RX ADMIN — DEXAMETHASONE SODIUM PHOSPHATE 10 MG: 10 INJECTION INTRAMUSCULAR; INTRAVENOUS at 09:49

## 2025-07-21 RX ADMIN — SODIUM CHLORIDE, POTASSIUM CHLORIDE, SODIUM LACTATE AND CALCIUM CHLORIDE 90 ML/HR: 600; 310; 30; 20 INJECTION, SOLUTION INTRAVENOUS at 18:30

## 2025-07-21 RX ADMIN — FENTANYL CITRATE 50 MCG: 50 INJECTION, SOLUTION INTRAMUSCULAR; INTRAVENOUS at 15:20

## 2025-07-21 RX ADMIN — FENTANYL CITRATE 50 MCG: 50 INJECTION, SOLUTION INTRAMUSCULAR; INTRAVENOUS at 15:35

## 2025-07-21 RX ADMIN — Medication 180 MCG/KG/MIN: at 10:14

## 2025-07-21 RX ADMIN — LIDOCAINE HYDROCHLORIDE 1 EACH: 40 SOLUTION TOPICAL at 09:47

## 2025-07-21 RX ADMIN — REMIFENTANIL HYDROCHLORIDE 0.2 MCG/KG/MIN: 1 INJECTION, POWDER, LYOPHILIZED, FOR SOLUTION INTRAVENOUS at 10:14

## 2025-07-21 RX ADMIN — FAMOTIDINE 20 MG: 20 TABLET, FILM COATED ORAL at 09:03

## 2025-07-21 RX ADMIN — Medication 100 MG: at 10:09

## 2025-07-21 RX ADMIN — HYDROCODONE BITARTRATE AND ACETAMINOPHEN 1 TABLET: 5; 325 TABLET ORAL at 23:04

## 2025-07-21 RX ADMIN — SODIUM CHLORIDE, POTASSIUM CHLORIDE, SODIUM LACTATE AND CALCIUM CHLORIDE: 600; 310; 30; 20 INJECTION, SOLUTION INTRAVENOUS at 13:48

## 2025-07-21 RX ADMIN — Medication 100 MG: at 09:45

## 2025-07-21 RX ADMIN — ROCURONIUM BROMIDE 10 MG: 10 INJECTION INTRAVENOUS at 09:45

## 2025-07-21 RX ADMIN — NICOTINE 1 PATCH: 14 PATCH TRANSDERMAL at 19:59

## 2025-07-21 RX ADMIN — SODIUM CHLORIDE, POTASSIUM CHLORIDE, SODIUM LACTATE AND CALCIUM CHLORIDE: 600; 310; 30; 20 INJECTION, SOLUTION INTRAVENOUS at 10:59

## 2025-07-21 RX ADMIN — VASOPRESSIN 2 UNITS: 20 INJECTION INTRAVENOUS at 13:07

## 2025-07-21 RX ADMIN — FENTANYL CITRATE 50 MCG: 50 INJECTION, SOLUTION INTRAMUSCULAR; INTRAVENOUS at 09:43

## 2025-07-21 RX ADMIN — Medication 50 MG: at 09:53

## 2025-07-21 RX ADMIN — HYDROMORPHONE HYDROCHLORIDE 0.5 MG: 1 INJECTION, SOLUTION INTRAMUSCULAR; INTRAVENOUS; SUBCUTANEOUS at 16:02

## 2025-07-21 RX ADMIN — SODIUM CHLORIDE 2 G: 900 INJECTION INTRAVENOUS at 09:49

## 2025-07-21 RX ADMIN — ATENOLOL 25 MG: 25 TABLET ORAL at 19:58

## 2025-07-21 RX ADMIN — ESCITALOPRAM 5 MG: 5 TABLET, FILM COATED ORAL at 19:58

## 2025-07-21 RX ADMIN — FAMOTIDINE 20 MG: 20 TABLET, FILM COATED ORAL at 19:58

## 2025-07-21 RX ADMIN — LIDOCAINE HYDROCHLORIDE 0.5 ML: 10 INJECTION, SOLUTION EPIDURAL; INFILTRATION; INTRACAUDAL; PERINEURAL at 09:03

## 2025-07-21 RX ADMIN — OLANZAPINE 5 MG: 5 TABLET, FILM COATED ORAL at 19:58

## 2025-07-21 RX ADMIN — Medication 50 MG: at 09:59

## 2025-07-21 RX ADMIN — DEXMEDETOMIDINE HYDROCHLORIDE 40 MCG: 100 INJECTION, SOLUTION INTRAVENOUS at 13:59

## 2025-07-21 NOTE — ANESTHESIA PROCEDURE NOTES
Airway  Reason: elective    Date/Time: 7/21/2025 9:47 AM  Airway not difficult    General Information and Staff    Patient location during procedure: OR  CRNA/CAA: Helen Cook CRNA    Indications and Patient Condition  Indications for airway management: airway protection    Preoxygenated: yes  MILS not maintained throughout    Mask difficulty assessment: 1 - vent by mask    Final Airway Details    Final airway type: endotracheal airway      Successful airway: ETT  Cuffed: yes   Successful intubation technique: video laryngoscopy  Endotracheal tube insertion site: oral  Blade: Chen  Blade size: 3  ETT size (mm): 7.5  Cormack-Lehane Classification: grade I - full view of glottis  Placement verified by: chest auscultation and capnometry   Cuff volume (mL): 6  Measured from: lips  ETT/EBT  to lips (cm): 22  Number of attempts at approach: 1  Assessment: lips, teeth, and gum same as pre-op and atraumatic intubation    Additional Comments  Negative epigastric sounds, Breath sound equal bilaterally with symmetric chest rise and fall

## 2025-07-21 NOTE — ANESTHESIA POSTPROCEDURE EVALUATION
Patient: Jazzy Valle    Procedure Summary       Date: 07/21/25 Room / [facility]:  [facility]    Anesthesia Start: 0941 Anesthesia Stop: 1445    Procedure: CERVICAL DISCECTOMY LAMINECTOMY C2-C7 DECOMPRESSION POSTERIOR FUSION C2 - T2  WITH INSTRUMENTATION (Spine Cervical) Diagnosis:       Cervical spondylosis with myelopathy      (Cervical spondylosis with myelopathy [M47.12])    Surgeons: Houston Danielson MD Provider: David Murry MD    Anesthesia Type: general ASA Status: 3            Anesthesia Type: general    Vitals  Vitals Value Taken Time   /68 07/21/25 14:45   Temp 97.6 °F (36.4 °C) 07/21/25 14:45   Pulse 82 07/21/25 14:45   Resp 16 07/21/25 14:45   SpO2 95 % 07/21/25 14:45           Post Anesthesia Care and Evaluation    Patient location during evaluation: PACU  Patient participation: complete - patient participated  Level of consciousness: awake and alert  Pain management: adequate    Airway patency: patent  Anesthetic complications: No anesthetic complications  PONV Status: none  Cardiovascular status: hemodynamically stable and acceptable  Respiratory status: nonlabored ventilation, acceptable, nasal cannula and spontaneous ventilation  Hydration status: acceptable  No anesthesia care post op

## 2025-07-21 NOTE — H&P
Pre-Op H&P  Jazzy Valle  [id number]  1975      Chief complaint: Neck pain      Subjective:  Patient is a 49 y.o.female presents for scheduled surgery by Dr. Danielson. She anticipates a CERVICAL DISCECTOMY LAMINECTOMY C2-C7 DECOMPRESSION POSTERIOR FUSION C2 - T2  WITH INSTRUMENTATION today. She reports neck pain for 6-7 months. The pain radiates down BUE. She had tightness in her hands that causes trouble using them.     Per office note 6/20/25: (((MRI of the cervical spine dated 2/26/2025 demonstrates the prior uninstrumented fusion at C4-5. There is diffuse significant stenosis throughout the entire cervical spine. There is diffuse spondylosis and disc disease. The narrowing is present from the mid C2 level down to the upper C7 level. There is reina signal change within the cord at C3-4.)))       Review of Systems:  Constitutional-- No fever, chills or sweats. No fatigue.  CV-- No chest pain, palpitation or syncope. +HTN  Resp-- No SOB, cough, hemoptysis  Skin--No rashes or lesions      Allergies:   Allergies   Allergen Reactions    Abilify [Aripiprazole] Mental Status Change    Cymbalta [Duloxetine Hcl] Other (See Comments)     Hot all over.         Home Meds:  Medications Prior to Admission   Medication Sig Dispense Refill Last Dose/Taking    atenolol (TENORMIN) 50 MG tablet Take 1 tablet by mouth Daily. (Patient taking differently: Take 0.5 tablets by mouth Daily.) 30 tablet 0     Chlorhexidine Gluconate 4 % solution Shower each day with solution for 5 days beginning 5 days before surgery. 120 mL 0     escitalopram (Lexapro) 5 MG tablet Take 1 tablet by mouth Daily. 30 tablet 1     ferrous sulfate 325 (65 FE) MG tablet Take 1 tablet by mouth Daily With Breakfast. 30 tablet 2     mupirocin (BACTROBAN) 2 % nasal ointment Apply to the inside of each nostril with a cotton swab two times daily, morning and evening, for 5 days before surgery. 10 each 0     OLANZapine (ZyPREXA) 5 MG tablet Take 1 tablet by  mouth Every Night. 30 tablet 1          PMH:   Past Medical History:   Diagnosis Date    Anxiety     Bipolar disorder     Depression     Hernia of abdominal wall     Hypertension     PCO (polycystic ovaries)     Psychosis     Schizoaffective disorder     Substance abuse      PSH:    Past Surgical History:   Procedure Laterality Date    CERVICAL SPINE SURGERY  03/21/2009    ACDF C4-5- Dr. Mark Anthony Escobar    COLONOSCOPY      KNEE SURGERY Right 2014    scope       Immunization History:  Influenza: No  Pneumococcal: No  Tetanus: UTD    Social History:   Tobacco:   Social History     Tobacco Use   Smoking Status Every Day    Current packs/day: 0.25    Average packs/day: 1 pack/day for 30.6 years (30.3 ttl pk-yrs)    Types: Cigarettes    Start date: 1995    Passive exposure: Current   Smokeless Tobacco Never   Tobacco Comments    1-2 cigarettes as of 7-      Alcohol:     Social History     Substance and Sexual Activity   Alcohol Use No         Physical Exam:/89 (BP Location: Right arm, Patient Position: Lying)   Pulse 77   Temp 97.8 °F (36.6 °C) (Temporal)   Resp 18   SpO2 97%       General Appearance:    Alert, cooperative, no distress, appears stated age   Head:    Normocephalic, without obvious abnormality, atraumatic   Lungs:     Clear to auscultation bilaterally, respirations unlabored    Heart:   Regular rate and rhythm, S1 and S2 normal    Abdomen:    Soft without tenderness   Extremities:   Extremities normal, atraumatic, no cyanosis or edema   Skin:   Skin color, texture, turgor normal, no rashes or lesions   Neurologic:   Grossly intact     Results Review:     LABS:  Lab Results   Component Value Date    WBC 7.47 07/14/2025    HGB 11.6 (L) 07/14/2025    HCT 36.6 07/14/2025    MCV 89.1 07/14/2025     07/14/2025    NEUTROABS 4.51 04/17/2025    GLUCOSE 148 (H) 12/19/2023    BUN 6 12/19/2023    CREATININE 0.79 12/19/2023    EGFRIFNONA 81 09/22/2021     12/19/2023    K 3.8 07/14/2025      12/19/2023    CO2 26.8 12/19/2023    MG 1.8 09/22/2021    CALCIUM 9.1 12/19/2023    ALBUMIN 4.0 12/19/2023    AST 31 12/19/2023    ALT 31 12/19/2023    BILITOT 0.2 12/19/2023       RADIOLOGY:    Study Result    Narrative & Impression   EXAMINATION: MRI CERVICAL SPINE WO CONTRAST-      CLINICAL INDICATION:  Hx of ACDF C4-5 in 2008; G95.9-Disease of spinal  cord, unspecified      TECHNIQUE: Sagittal spin echo scans were obtained from the posterior  fossa through the upper thoracic spine and axial scans were performed  through selected cervical disc space levels, utilizing both spin echo  and gradient echo scans technique without contrast administration.     COMPARISON: NONE         FINDINGS:     HARDWARE:  Interbody disc fusion with interbody spacer at the C4-C5 level.     ALIGNMENT:  Alignment is unremarkable. No listhesis.  Lordosis is preserved.     SPINAL CORD:?  Focal increased signal involving the cord at the C3/4 level in the  setting of stenosis and may represent cord myelomalacia versus possible  cord edema.     BONES:  No fracture. No marrow signal abnormality.     POSTERIOR ELEMENTS:  Congenital shortening of pedicles contributes to generalized stenosis.     POSTERIOR FOSSA:  The partially imaged posterior fossa is unremarkable.     SOFT TISSUES:  The visualized paraspinal soft tissues are unremarkable.       C1/2 Articulation: No significant arthritis.  Atlanto-occipital articulation: No significant arthritis.     DISC SPACES/NEUROFORAMINA:     C2/3: Annular disc bulge. No stenosis.  C3/4: Broad-based posterior disc osteophyte complex. Shortening of the  pedicles. Moderate central canal stenosis. Severe right greater than  left bilateral neural foraminal stenosis.  C4/5: Interbody disc fusion with interbody spacer. Mild-moderate right  greater than left neural foraminal stenosis. Mild central canal  stenosis.  C5/6: Broad-based posterior disc osteophyte complex eccentric to right.  Mild central  canal stenosis. Severe right neuroforaminal stenosis. No  left neuroforaminal stenosis.  C6/7: Broad-based posterior disc osteophyte complex eccentric to right.  Bilateral facet arthropathy. Moderate central canal stenosis. Severe  bilateral neuroforaminal stenosis.  C7/T1: Broad-based posterior disc osteophyte complex. Bilateral facet  arthropathy. No central canal stenosis. Moderate bilateral  neuroforaminal stenosis.     OTHER:  No additional remarkable findings.     IMPRESSION:  1. Multilevel degenerative disc disease, shortening the pedicles, and  facet arthropathy resulting in multilevel central canal and  neuroforaminal stenosis at levels detailed above.  2. Postsurgical changes from interbody fusion at the C4-C5 level.  3. Focal increased fluid signal involving the spinal cord at the C3/4  level in the setting of stenosis. May represent myelomalacia or cord  edema.  4. No fracture or traumatic malalignment.       I reviewed the patient's new clinical results.    Cancer Staging (if applicable)  Cancer Patient: __ yes __no __unknown; If yes, clinical stage T:__ N:__M:__, stage group or __N/A      Impression: Cervical spondylosis with myelopathy      Plan: CERVICAL DISCECTOMY LAMINECTOMY C2-C7 DECOMPRESSION POSTERIOR FUSION C2 - T2  WITH INSTRUMENTATION      Esmer Kendall, RICARDO   7/21/2025   08:33 EDT

## 2025-07-21 NOTE — ANESTHESIA PREPROCEDURE EVALUATION
Anesthesia Evaluation     Patient summary reviewed and Nursing notes reviewed                Airway   Mallampati: II  TM distance: >3 FB  Neck ROM: full  No difficulty expected  Dental - normal exam   (+) poor dentition    Pulmonary - normal exam   (+) a smoker Current,  Cardiovascular - normal exam    (+) hypertension    ROS comment:   Stress test 9/16: normal EF, no ischemia, low risk study    Neuro/Psych  (+) psychiatric history  GI/Hepatic/Renal/Endo    (+) morbid obesity    Musculoskeletal (-) negative ROS    Abdominal  - normal exam    Bowel sounds: normal.   Substance History - negative use     OB/GYN negative ob/gyn ROS         Other                      Anesthesia Plan    ASA 3     general     (rey)  intravenous induction     Anesthetic plan, risks, benefits, and alternatives have been provided, discussed and informed consent has been obtained with: patient.    Plan discussed with CRNA.      CODE STATUS:

## 2025-07-21 NOTE — OP NOTE
NEUROSURGICAL OPERATIVE NOTE        PREOPERATIVE DIAGNOSIS:    Cervical spondylosis and stenosis with myelopathy      POSTOPERATIVE DIAGNOSIS:  Same      PROCEDURE:  1.  Arthrodesis posterior lateral type C2-T2  2.  Segmental screw isabella fixation C2-T2 utilizing Infinity system  3.  C2-C7 laminectomies  4.  Use of MagnetOs and local autograft  5.  Stealth stereotaxy utilizing conjunction with O arm imaging      SURGEON:  Houston Danielson M.D.      ASSISTANT: Cindi Iverson PA-C    PAC assisted with:   Suctioning   Retraction   Tying   Suturing   Closing   Application of dressing   Skilled neurosurgery PA assistance was necessary to perform this procedure.        ANESTHESIA:  General      ESTIMATED BLOOD LOSS: 125 mL      SPECIMEN: None      DRAINS: 7 flat STACI      COMPLICATIONS:  None      Spinal Surgery Levels Completed:6+ Levels        CLINICAL NOTE:  The patient is a 49-year-old woman who remotely underwent anterior cervical surgery by one of my former colleagues.  More recently she has developed progressive neck pain with numbness and weakness in her hands and arms.  She has had altered gait.  She is frankly myelopathic on examination.  Studies demonstrate diffuse cervical spondylosis and stenosis with cord compromise at multiple levels.  As such, she presents at this time for multilevel decompression with fusion and stabilization from C2-T2.  The nature of the procedure as well as the potential risks, complications, limitations, and alternatives to the procedure were discussed at length with the patient and the patient has agreed to proceed with surgery.      TECHNICAL NOTE:  The patient was brought to the operating room and while on her cart, general endotracheal anesthesia was achieved.  Sensory motor spinal cord monitoring was performed; baselines were obtained, which demonstrated some decreased potentials in the left arm and in the right leg.  Madison head mancilla was affixed to her head via 3-point pin  fixation.  She was then log-rolled onto the Pk table.  Special care was ensured to protect pressure points.  Her arms were padded and tucked at her sides.  Her head was maintained in a neutral in-line position and head mancilla was secured to the operating room table.  She did receive preoperative antibiotics and Decadron.  Her suboccipital region as well as her dorsal neck and upper thoracic region were prepared and draped in the usual fashion.  O-arm imaging ensued.  These images were downloaded into the [facility] using Stealth stereotaxy.  The upper and lower extent of the incision to be fashioned was marked.  A lengthy midline incision from the suboccipital region into the upper thoracic incision was fashioned.  Posterior spinal elements were exposed with cautery.  The exposure was from C2 to T2.  Periodic spinal cord potentials were checked and remained stable.  Using Stealth frameless stereotaxy, each of the pedicle screw hole sites at C2, C7, T1, and T2, were marked, drilled and tapped.  The Infinity system was utilized.  All screws placed were 3.5 mm and varied in length from 24 to 32 mm.  Good purchase was achieved.  Spinous processes at C3, C4, C5, C6, and the upper half of C7 were removed.   holes were then placed within the lateral masses at C3, C4, C5 on each side.  These holes were drilled, tapped, all screws placed bilaterally at C3, C4, C5, with 3-4 mm in diameter and varying from 14 to 16 mm in length.  Potentials were once again checked and potentials in the right hand and right foot were further diminished.  O-arm imaging ensued again which confirmed good positioning of the hardware without any aberrant screw placement.  The lateral masses and the lamina from C2 to T2 were decorticated.  Infinity rods were contoured within the screw heads from C2 to T2 segmentally.  Set screws were applied and tightened per routine on each side.  Throughout the case, the patient's blood pressure was  maintained in a normal range.  Spinous process at C2 was removed and then the drill was utilized to thin out the lamina from C2 to the upper half of C7.  The bone was thinned such that a 2 mm Kerrison punch was utilized to remove the thin of the bone that was left in overgrown ligament.  Greater than 50% of the lamina at C7 was resected.  Complete laminectomies were performed at C2, C3, C4, C5, and C6 as well.  Good decompression of the thecal sac was accomplished.  After the decompression, potentials were once again checked and had returned to their preoperative baseline.  Bleeding points were controlled with bone wax and Floseal.  A 7 flat STACI drain was brought in through a separate stab incision and left in the epidural space.  Vancomycin powder was sprinkled in the depths of the wound and more superficially as the wound was closed.  The paraspinous muscle and fascia were reapproximated in interrupted fashion with 0 Vicryl suture.  Exparel diluted 50/50 with saline was instilled in the paraspinous musculature and subcutaneous tissues.  Subcutaneous tissues were further closed in interrupted fashion with 2-0 followed by 3-0 Vicryl suture.  Skin was closed in a running locked fashion with 3-0 nylon suture.  Sterile dressing was applied.  The patient was rolled onto the bed, the head mancilla removed, she was extubated and taken to the recovery room in satisfactory condition.  There were no overt intraoperative complications.              Houston Danielson M.D.

## 2025-07-22 PROCEDURE — 97166 OT EVAL MOD COMPLEX 45 MIN: CPT | Performed by: OCCUPATIONAL THERAPIST

## 2025-07-22 PROCEDURE — 25010000002 MORPHINE PER 10 MG: Performed by: NEUROLOGICAL SURGERY

## 2025-07-22 PROCEDURE — 99024 POSTOP FOLLOW-UP VISIT: CPT | Performed by: NEUROLOGICAL SURGERY

## 2025-07-22 PROCEDURE — 97535 SELF CARE MNGMENT TRAINING: CPT | Performed by: OCCUPATIONAL THERAPIST

## 2025-07-22 PROCEDURE — 97530 THERAPEUTIC ACTIVITIES: CPT

## 2025-07-22 PROCEDURE — 25010000002 CEFAZOLIN PER 500 MG: Performed by: NEUROLOGICAL SURGERY

## 2025-07-22 PROCEDURE — 97116 GAIT TRAINING THERAPY: CPT

## 2025-07-22 PROCEDURE — 97110 THERAPEUTIC EXERCISES: CPT | Performed by: OCCUPATIONAL THERAPIST

## 2025-07-22 PROCEDURE — 97162 PT EVAL MOD COMPLEX 30 MIN: CPT

## 2025-07-22 RX ADMIN — NICOTINE 1 PATCH: 14 PATCH TRANSDERMAL at 19:33

## 2025-07-22 RX ADMIN — SENNOSIDES, DOCUSATE SODIUM 2 TABLET: 50; 8.6 TABLET, FILM COATED ORAL at 10:25

## 2025-07-22 RX ADMIN — OLANZAPINE 5 MG: 5 TABLET, FILM COATED ORAL at 19:33

## 2025-07-22 RX ADMIN — SODIUM CHLORIDE 2000 MG: 900 INJECTION INTRAVENOUS at 05:00

## 2025-07-22 RX ADMIN — MORPHINE SULFATE 1 MG: 2 INJECTION, SOLUTION INTRAMUSCULAR; INTRAVENOUS at 13:52

## 2025-07-22 RX ADMIN — ATENOLOL 25 MG: 25 TABLET ORAL at 10:25

## 2025-07-22 RX ADMIN — FERROUS SULFATE TAB 325 MG (65 MG ELEMENTAL FE) 325 MG: 325 (65 FE) TAB at 10:25

## 2025-07-22 RX ADMIN — FAMOTIDINE 20 MG: 20 TABLET, FILM COATED ORAL at 19:33

## 2025-07-22 RX ADMIN — OXYCODONE HYDROCHLORIDE AND ACETAMINOPHEN 1 TABLET: 7.5; 325 TABLET ORAL at 11:51

## 2025-07-22 RX ADMIN — OXYCODONE HYDROCHLORIDE AND ACETAMINOPHEN 1 TABLET: 7.5; 325 TABLET ORAL at 23:38

## 2025-07-22 RX ADMIN — Medication 3 ML: at 19:34

## 2025-07-22 RX ADMIN — HYDROCODONE BITARTRATE AND ACETAMINOPHEN 1 TABLET: 5; 325 TABLET ORAL at 19:33

## 2025-07-22 RX ADMIN — OXYCODONE HYDROCHLORIDE AND ACETAMINOPHEN 1 TABLET: 7.5; 325 TABLET ORAL at 03:26

## 2025-07-22 RX ADMIN — Medication 3 ML: at 10:26

## 2025-07-22 RX ADMIN — HYDROCODONE BITARTRATE AND ACETAMINOPHEN 1 TABLET: 5; 325 TABLET ORAL at 07:00

## 2025-07-22 RX ADMIN — OXYCODONE HYDROCHLORIDE AND ACETAMINOPHEN 1 TABLET: 7.5; 325 TABLET ORAL at 17:09

## 2025-07-22 RX ADMIN — FAMOTIDINE 20 MG: 20 TABLET, FILM COATED ORAL at 10:25

## 2025-07-22 NOTE — THERAPY EVALUATION
Patient Name: Jazzy Valle  : 1975    MRN: [id number]                              Today's Date: 2025       Admit Date: 2025    Visit Dx:     ICD-10-CM ICD-9-CM   1. Cervical spondylosis with myelopathy  M47.12 721.1     Patient Active Problem List   Diagnosis    Essential hypertension    Obesity (BMI 30-39.9)    Tobacco abuse    Chest pressure    Psychosis    Acute psychosis    Chronic residual schizophrenia with acute exacerbations    Methamphetamine use disorder, severe, dependence    Anemia, iron deficiency    Iron malabsorption    Cervical spondylosis with myelopathy    Cervical spondylolysis     Past Medical History:   Diagnosis Date    Anxiety     Bipolar disorder     Depression     Hernia of abdominal wall     Hypertension     PCO (polycystic ovaries)     Psychosis     Schizoaffective disorder     Substance abuse      Past Surgical History:   Procedure Laterality Date    CERVICAL SPINE SURGERY  2009    ACDF C4-5- Dr. Mark Anthony Escobar    COLONOSCOPY      KNEE SURGERY Right 2014    scope      General Information       Row Name 25 0945          OT Time and Intention    Subjective Information complains of;pain  -TB     Document Type evaluation;therapy note (daily note)  -TB     Mode of Treatment occupational therapy  -TB     Patient Effort good  -TB     Symptoms Noted During/After Treatment increased pain  -TB       Row Name 25 0945          General Information    Patient Profile Reviewed yes  -TB     Prior Level of Function independent:;all household mobility;community mobility;transfer;bed mobility;ADL's;driving;shopping;using stairs  Pt reports Independent with increased effort all tasks. No AD or falls prior  -TB     Existing Precautions/Restrictions fall;spinal;other (see comments)  STACI drain  -TB     Barriers to Rehab medically complex  -TB       Row Name 25 0945          Occupational Profile    Reason for Services/Referral (Occupational Profile) Occupational  decline  -TB     Environmental Supports and Barriers (Occupational Profile) Pt lives with her dad in a single story home with 3 steps to enter. Tub/shower. No DME. Comfort ht commodes. No AD or falls prior. Independent all self-care with increased effort.  -TB       Row Name 07/22/25 0945          Living Environment    Current Living Arrangements home  -TB     People in Home parent(s)  Father  -TB       Row Name 07/22/25 0945          Home Main Entrance    Number of Stairs, Main Entrance three  -TB     Stair Railings, Main Entrance railings on both sides of stairs  -TB       Row Name 07/22/25 0945          Stairs Within Home, Primary    Number of Stairs, Within Home, Primary none  -TB       Row Name 07/22/25 0945          Cognition    Orientation Status (Cognition) oriented x 4  -TB       Row Name 07/22/25 0945          Safety Issues/Impairments Affecting Functional Mobility    Safety Issues Affecting Function (Mobility) insight into deficits/self-awareness;safety precaution awareness;safety precautions follow-through/compliance;sequencing abilities  -TB     Impairments Affecting Function (Mobility) balance;endurance/activity tolerance;pain;strength;postural/trunk control;range of motion (ROM);sensation/sensory awareness  -TB     Comment, Safety Issues/Impairments (Mobility) Pt is up in room with RW support and Min Ax1.  -TB               User Key  (r) = Recorded By, (t) = Taken By, (c) = Cosigned By      Initials Name Provider Type    TB Maryan Betancourt OT Occupational Therapist                     Mobility/ADL's       Row Name 07/22/25 0991          Bed Mobility    Bed Mobility rolling left;sidelying-sit;scooting/bridging  -TB     Rolling Left [place] (Bed Mobility) minimum assist (75% patient effort);2 person assist;verbal cues  -TB     Scooting/Bridging [place] (Bed Mobility) contact guard;verbal cues  -TB     Sidelying-Sit [place] (Bed Mobility) moderate assist (50% patient effort);2  person assist;verbal cues  -TB     Bed Mobility, Safety Issues decreased use of arms for pushing/pulling  -TB     Assistive Device (Bed Mobility) bed rails  -TB     Comment, (Bed Mobility) Education initiated for spinal precautions and logroll sequencing. Cues and assist to complete transition.  -TB       Row Name 07/22/25 0950          Transfers    Transfers sit-stand transfer;stand-sit transfer;bed-chair transfer  -TB     Comment, (Transfers) Education, cues, and assist for hand placement, sequencing, and safety.  -TB       Row Name 07/22/25 0950          Bed-Chair Transfer    Bed-Chair [place] (Transfers) minimum assist (75% patient effort);1 person assist;verbal cues  -TB     Assistive Device (Bed-Chair Transfers) walker, front-wheeled  -TB       Row Name 07/22/25 0950          Sit-Stand Transfer    Sit-Stand [place] (Transfers) contact guard;verbal cues  -TB     Assistive Device (Sit-Stand Transfers) walker, 4-wheeled  -TB     Comment, (Sit-Stand Transfer) From elevated bed surface  -TB       Row Name 07/22/25 0950          Stand-Sit Transfer    Stand-Sit [place] (Transfers) minimum assist (75% patient effort);1 person assist;verbal cues  -TB     Assistive Device (Stand-Sit Transfers) walker, front-wheeled  -TB     Comment, (Stand-Sit Transfer) Assist to lower with control  -TB       Row Name 07/22/25 0950          Functional Mobility    Functional Mobility- Ind. Level minimum assist (75% patient effort);contact guard assist  -TB     Functional Mobility- Device walker, front-wheeled  -TB     Functional Mobility-Distance (Feet) 40  -TB     Functional Mobility- Safety Issues sequencing ability decreased;balance decreased during turns  -TB     Functional Mobility- Comment Pt is up in room with good effort. Progressed to CGA.  -TB     Patient was able to Ambulate yes  -TB       Row Name 07/22/25 0950          Activities of Daily Living    BADL Assessment/Intervention bathing;upper body dressing;lower  body dressing;feeding  -TB       Row Name 07/22/25 0950          Bathing Assessment/Intervention    [place] Level (Bathing) set up;distal lower extremities/feet  -TB     Assistive Devices (Bathing) long-handled sponge  -TB     Position (Bathing) supported sitting  -TB     Comment, (Bathing) Appropriate AE issued and teaching initiated for spinal precautions and ADL retraining to maintain.  -TB       Row Name 07/22/25 0950          Upper Body Dressing Assessment/Training    [place] Level (Upper Body Dressing) don;pajama/robe;minimum assist (75% patient effort);verbal cues  -TB     Position (Upper Body Dressing) edge of bed sitting  -TB       Row Name 07/22/25 0950          Lower Body Dressing Assessment/Training    [place] Level (Lower Body Dressing) dependent (less than 25% patient effort);don;socks  -TB     Position (Lower Body Dressing) edge of bed sitting  -TB     Comment, (Lower Body Dressing) Appropriate AE issued and teaching initiated for spinal precautions and ADL retraining to maintain. Follow up teaching and trial recommended. Pt limited by acute pain.  -TB       Row Name 07/22/25 0945          Self-Feeding Assessment/Training    [place] Level (Feeding) set up;prepare tray/open items;independent;scoop food and bring to mouth;liquids to mouth  -TB     Position (Feeding) supported sitting  -TB               User Key  (r) = Recorded By, (t) = Taken By, (c) = Cosigned By      Initials Name Provider Type    TB Maryan Betancourt OT Occupational Therapist                   Obj/Interventions       Row Name 07/22/25 0925          Sensory Assessment (Somatosensory)    Sensory Assessment (Somatosensory) bilateral LE  -TB     Sensory Subjective Reports numbness;tingling  -TB     Sensory Assessment R hand/digit numbness medial side, LUE numbness medial side shoulder to hand/digits  -TB       Row Name 07/22/25 0957          Sensory Interventions    Sensory Re-education (Sensation) visual  observation of sensory input;occupation/activity based interventions  -[facility] Name 07/22/25 0954          Vision Assessment/Intervention    Visual Impairment/Limitations WFL  -[facility] Name 07/22/25 0954          Range of Motion Comprehensive    General Range of Motion bilateral upper extremity ROM WFL  -TB     Comment, General Range of Motion BUE/hands grossly WFL  -[facility] Name 07/22/25 0954          Strength Comprehensive (MMT)    General Manual Muscle Testing (MMT) Assessment upper extremity strength deficits identified  -     Comment, General Manual Muscle Testing (MMT) Assessment Generalized weakness. B shoulders and  3 to 3+ /5  -[facility] Name 07/22/25 0954          Shoulder (Therapeutic Exercise)    Shoulder (Therapeutic Exercise) AROM (active range of motion)  -     Shoulder AROM (Therapeutic Exercise) bilateral;flexion;extension;aBduction;aDduction;supine;5 repetitions;2 sets  -TB       Row Name 07/22/25 0954          Hand (Therapeutic Exercise)    Hand (Therapeutic Exercise) strengthening exercise  -     Hand Strengthening (Therapeutic Exercise) bilateral;finger flexion;finger extension;red;green; strengthening;thumb opposition;thumb flexion;squeeze ball/egg;5 repetitions;2 sets  -TB       Row Name 07/22/25 0954          Motor Skills    Therapeutic Exercise hand;shoulder  Gentle shoulder ther ex initiated to reduce tension from guarding. Foam Block HEP initiated to support  and pinch.  -[facility] Name 07/22/25 0954          Balance    Balance Assessment sitting static balance;sit to stand dynamic balance;sitting dynamic balance;standing static balance;standing dynamic balance  -     Static Sitting Balance supervision  -     Dynamic Sitting Balance standby assist  -     Position, Sitting Balance unsupported;sitting in chair;sitting edge of bed  -     Sit to Stand Dynamic Balance contact guard;verbal cues  -     Static Standing Balance contact guard;1-person  assist;verbal cues  -TB     Dynamic Standing Balance minimal assist;1-person assist;verbal cues  -TB     Position/Device Used, Standing Balance supported;walker, front-wheeled  -TB     Balance Interventions sitting;standing;sit to stand;supported;static;dynamic;dynamic reaching;occupation based/functional task  -TB     Comment, Balance No LOB  -TB               User Key  (r) = Recorded By, (t) = Taken By, (c) = Cosigned By      Initials Name Provider Type    TB Maryan Betancourt OT Occupational Therapist                   Goals/Plan       Row Name 07/22/25 1002          Transfer Goal 1 (OT)    Activity/Assistive Device (Transfer Goal 1, OT) toilet  -TB     [place] Level/Cues Needed (Transfer Goal 1, OT) standby assist;verbal cues required  -TB     Time Frame (Transfer Goal 1, OT) long term goal (LTG);5 days  -TB     Progress/Outcome (Transfer Goal 1, OT) goal ongoing  -TB       Row Name 07/22/25 1002          Dressing Goal 1 (OT)    Activity/Device (Dressing Goal 1, OT) lower body dressing  -TB     [place]/Cues Needed (Dressing Goal 1, OT) moderate assist (50-74% patient effort);verbal cues required  -TB     Time Frame (Dressing Goal 1, OT) long term goal (LTG);5 days  -TB     Progress/Outcome (Dressing Goal 1, OT) goal ongoing  -TB       Row Name 07/22/25 1002          Toileting Goal 1 (OT)    Activity/Device (Toileting Goal 1, OT) toileting skills, all  -TB     [place] Level/Cues Needed (Toileting Goal 1, OT) standby assist  -TB     Time Frame (Toileting Goal 1, OT) short term goal (STG);3 days  -TB     Progress/Outcome (Toileting Goal 1, OT) goal ongoing  -TB               User Key  (r) = Recorded By, (t) = Taken By, (c) = Cosigned By      Initials Name Provider Type    TB Maryan Betancourt OT Occupational Therapist                   Clinical Impression       Row Name 07/22/25 0959          Pain Assessment    Pretreatment Pain Rating 6/10  -TB     Posttreatment Pain Rating 8/10  -TB      Pain Location neck  -TB     Pain Side/Orientation posterior  -TB     Pain Management Interventions activity modification encouraged;exercise or physical activity utilized;cold applied  -TB     Response to Pain Interventions activity participation with tolerable pain  -TB     Pre/Posttreatment Pain Comment Pt tolerates OOB activity and sitting at end of session.  -TB       Row Name 07/22/25 0959          Plan of Care Review    Outcome Evaluation OT IE completed. Pt is A/Ox4 and motivated to work with therapy. Education initiated for spinal precautions and logroll sequencing. Pt transitions to EOB sitting with Mod Ax2. Pt is up in room and transfering with RW support and Min Ax1, progressing to CGAx1. Appropriate AE issued and teaching initiated for spinal precautions and ADL retraining to maintain. OT will follow IP to promote self-care. Recommend home with family support when pt it medically ready.  -TB       Row Name 07/22/25 0959          Therapy Assessment/Plan (OT)    Rehab Potential (OT) good  -TB     Criteria for Skilled Therapeutic Interventions Met (OT) yes;meets criteria;skilled treatment is necessary  -TB     Therapy Frequency (OT) daily  -TB       Row Name 07/22/25 0959          Therapy Plan Review/Discharge Plan (OT)    Anticipated Discharge Disposition (OT) home with assist  -TB       Row Name 07/22/25 0959          Vital Signs    Pre Systolic BP Rehab --  RN cleared OT  -TB     O2 Delivery Pre Treatment room air  -TB     Pre Patient Position Supine  -TB     Intra Patient Position Standing  -TB     Post Patient Position Sitting  -TB       Row Name 07/22/25 0959          Positioning and Restraints    Pre-Treatment Position in bed  -TB     Post Treatment Position chair  -TB     In Chair notified nsg;reclined;call light within reach;encouraged to call for assist;exit alarm on;legs elevated  -TB               User Key  (r) = Recorded By, (t) = Taken By, (c) = Cosigned By      Initials Name Provider Type     Maryan Hall, OT Occupational Therapist                   Outcome Measures       Row Name 07/22/25 1003          How much help from another is currently needed...    Putting on and taking off regular lower body clothing? 2  -TB     Bathing (including washing, rinsing, and drying) 2  -TB     Toileting (which includes using toilet bed pan or urinal) 2  -TB     Putting on and taking off regular upper body clothing 3  -TB     Taking care of personal grooming (such as brushing teeth) 3  -TB     Eating meals 3  -TB     AM-PAC 6 Clicks Score (OT) 15  -TB       Row Name 07/22/25 0916          How much help from another person do you currently need...    Turning from your back to your side while in flat bed without using bedrails? 4  -SC     Moving from lying on back to sitting on the side of a flat bed without bedrails? 3  -SC     Moving to and from a bed to a chair (including a wheelchair)? 3  -SC     Standing up from a chair using your arms (e.g., wheelchair, bedside chair)? 3  -SC     Climbing 3-5 steps with a railing? 3  -SC     To walk in hospital room? 3  -SC     AM-PAC 6 Clicks Score (PT) 19  -SC     Highest Level of Mobility Goal Walk 10 Steps or More-6  -SC       Row Name 07/22/25 1003 07/22/25 0916       Functional Assessment    Outcome Measure Options AM-PAC 6 Clicks Daily Activity (OT)  - AM-PAC 6 Clicks Basic Mobility (PT)  -SC              User Key  (r) = Recorded By, (t) = Taken By, (c) = Cosigned By      Initials Name Provider Type    SC Robyn Michaud, PT Physical Therapist    Maryan Hall, OT Occupational Therapist                    Occupational Therapy Education       Title: PT OT SLP Therapies (In Progress)       Topic: Occupational Therapy (In Progress)       Point: ADL training (Done)       Learning Progress Summary            Patient Acceptance, E,D, VU,NR by  at 7/22/2025 1004    Comment: Appropriate AE issued and teaching initiated for spinal precautions and ADL  retraining to maintain. HEP issued and teaching initated.                      Point: Home exercise program (Done)       Learning Progress Summary            Patient Acceptance, E,D, VU,NR by TB at 7/22/2025 1004    Comment: Appropriate AE issued and teaching initiated for spinal precautions and ADL retraining to maintain. HEP issued and teaching initated.                      Point: Precautions (Done)       Learning Progress Summary            Patient Acceptance, E,D, VU,NR by TB at 7/22/2025 1004    Comment: Appropriate AE issued and teaching initiated for spinal precautions and ADL retraining to maintain. HEP issued and teaching initated.                                      User Key       Initials Effective Dates Name Provider Type Discipline     07/11/23 -  Maryan Betancourt, IMAN Occupational Therapist OT                  OT Recommendation and Plan  Therapy Frequency (OT): daily  Plan of Care Review  Outcome Evaluation: OT IE completed. Pt is A/Ox4 and motivated to work with therapy. Education initiated for spinal precautions and logroll sequencing. Pt transitions to EOB sitting with Mod Ax2. Pt is up in room and transfering with RW support and Min Ax1, progressing to CGAx1. Appropriate AE issued and teaching initiated for spinal precautions and ADL retraining to maintain. OT will follow IP to promote self-care. Recommend home with family support when pt it medically ready.     Time Calculation:   Evaluation Complexity (OT)  Review Occupational Profile/Medical/Therapy History Complexity: expanded/moderate complexity  Assessment, Occupational Performance/Identification of Deficit Complexity: 3-5 performance deficits  Clinical Decision Making Complexity (OT): detailed assessment/moderate complexity  Overall Complexity of Evaluation (OT): moderate complexity     Time Calculation- OT       Row Name 07/22/25 0837 07/22/25 0826          Time Calculation- OT    OT Start Time 0837  -TB --     OT Received On  07/22/25  -TB --     OT Goal Re-Cert Due Date 08/01/25  -TB --        Timed Charges    54853 - OT Therapeutic Exercise Minutes 12  -TB --     54219 - Gait Training Minutes  -- 10  -SC     87334 - OT Self Care/Mgmt Minutes 15  -TB --        Untimed Charges    OT Eval/Re-eval Minutes 38  -TB --        Total Minutes    Timed Charges Total Minutes 27  -TB 10  -SC     Untimed Charges Total Minutes 38  -TB --      Total Minutes 65  -TB 10  -SC               User Key  (r) = Recorded By, (t) = Taken By, (c) = Cosigned By      Initials Name Provider Type    Robyn Devries, PT Physical Therapist    TB Maryan Betancourt, OT Occupational Therapist                  Therapy Charges for Today       Code Description Service Date Service Provider Modifiers Qty    [id number]  OT THER PROC EA 15 MIN 7/22/2025 Maryan Betancourt OT GO 1    [id number] HC OT SELF CARE/MGMT/TRAIN EA 15 MIN 7/22/2025 Maryan Betancourt OT GO 1    [id number]  OT EVAL MOD COMPLEXITY 3 7/22/2025 Maryan Betancourt OT GO 1                 Maryan Betancourt OT  7/22/2025

## 2025-07-22 NOTE — CASE MANAGEMENT/SOCIAL WORK
Discharge Planning Assessment  [facility]     Patient Name: Jazzy Valle  MRN: [id number]  Today's Date: 7/22/2025    Admit Date: 7/21/2025    Plan: home   Discharge Needs Assessment       Row Name 07/22/25 0958       Living Environment    People in Home parent(s)    Current Living Arrangements home    Potentially Unsafe Housing Conditions none    In the past 12 months has the electric, gas, oil, or water company threatened to shut off services in your home? No    Primary Care Provided by self    Provides Primary Care For no one    Family Caregiver if Needed parent(s)    Quality of Family Relationships involved;helpful    Able to Return to Prior Arrangements yes       Resource/Environmental Concerns    Resource/Environmental Concerns none    Transportation Concerns none       Transportation Needs    In the past 12 months, has lack of transportation kept you from medical appointments or from getting medications? no    In the past 12 months, has lack of transportation kept you from meetings, work, or from getting things needed for daily living? No       Food Insecurity    Within the past 12 months, you worried that your food would run out before you got the money to buy more. Never true    Within the past 12 months, the food you bought just didn't last and you didn't have money to get more. Never true       Transition Planning    Patient/Family Anticipates Transition to home with family    Patient/Family Anticipated Services at Transition none    Transportation Anticipated family or friend will provide       Discharge Needs Assessment    Readmission Within the Last 30 Days no previous admission in last 30 days    Equipment Currently Used at Home none    Concerns to be Addressed discharge planning    Do you want help finding or keeping work or a job? I do not need or want help    Do you want help with school or training? For example, starting or completing job training or getting a high school diploma, GED or  equivalent No    Anticipated Changes Related to Illness none    Equipment Needed After Discharge none                   Discharge Plan       Row Name 07/22/25 1036       Plan    Plan Comments Pt reports she lives with her father in [facility]. She was independent with ADLs and mobility prior to admit. She is followed by her PCP and has drug coverage. At this time her plan for discharge is to return home. CM will follow for dc needs    Final Discharge Disposition Code 01 - home or self-care      Row Name 07/22/25 0959       Plan    Plan home    Patient/Family in Agreement with Plan yes    Plan Comments Pt reports she lives with her father in [facility]. She was independent with ADLs and mobility prior to admit. Pt is followed by                  [facility] - Admitted Since 7/21/2025    No active coordination exists.          Demographic Summary       Row Name 07/22/25 0951       General Information    Admission Type inpatient    Referral Source physician    Reason for Consult discharge planning    Preferred Language English    General Information Comments PCP Nevaeh May       Contact Information    Permission Granted to Share Info With family/designee    Contact Information Comments Davie [phone]                   Functional Status       Row Name 07/22/25 0955       Functional Status    Usual Activity Tolerance good    Current Activity Tolerance good       Physical Activity    On average, how many days per week do you engage in moderate to strenuous exercise (like a brisk walk)? 0 days    On average, how many minutes do you engage in exercise at this level? 0 min    Number of minutes of exercise per week 0       Functional Status, IADL    Medications independent    Meal Preparation independent    Housekeeping independent    Laundry independent    Shopping independent    If for any reason you need help with day-to-day activities such as bathing, preparing meals, shopping,  managing finances, etc., do you get the help you need? I get all the help I need       Mental Status    General Appearance WDL WDL       Mental Status Summary    Recent Changes in Mental Status/Cognitive Functioning no changes       Employment/    Employment Status other (see comments)    Current or Previous Occupation not applicable                   Psychosocial    No documentation.                  Abuse/Neglect    No documentation.                  Legal    No documentation.                  Substance Abuse    No documentation.                  Patient Forms    No documentation.                     Cecilia Ramirez RN

## 2025-07-22 NOTE — PLAN OF CARE
Goal Outcome Evaluation:              Outcome Evaluation: Patient arrived from PACU to floor at 1649. At time of arrival patient was very drowsy but aroused to voice and oriented x4. VSS on 2L NC. Patient expressed need to void but unable to do so and abdomen tender to touch and distended. Bladder scan performed and 713mL noted on scan so cath performed per protocol and had output of 950mL. PA informed and daley anchored per order. Surgical dressing intact with small amount of dried drainage noted. 50mL of bloody drainage emptied from STACI drain. IVFs infusing. SCDs on. Family at bedside. Call light in reach

## 2025-07-22 NOTE — PLAN OF CARE
Patient ambulated in the room approximately 30 ft with strong two assist.  Ambulation aborted for significantly increased pain ( 10 ot of 10 ) / knee buckling. Border dressing CDI. 55 ml of drainage released from STACI at the begging of the shift.  equally weak due to numbness to bilateral hands ( per patient ). Catheter patency maintained. Call light in reach.

## 2025-07-22 NOTE — THERAPY EVALUATION
Patient Name: Jazzy Valle  : 1975    MRN: [id number]                              Today's Date: 2025       Admit Date: 2025    Visit Dx:     ICD-10-CM ICD-9-CM   1. Cervical spondylosis with myelopathy  M47.12 721.1     Patient Active Problem List   Diagnosis    Essential hypertension    Obesity (BMI 30-39.9)    Tobacco abuse    Chest pressure    Psychosis    Acute psychosis    Chronic residual schizophrenia with acute exacerbations    Methamphetamine use disorder, severe, dependence    Anemia, iron deficiency    Iron malabsorption    Cervical spondylosis with myelopathy    Cervical spondylolysis     Past Medical History:   Diagnosis Date    Anxiety     Bipolar disorder     Depression     Hernia of abdominal wall     Hypertension     PCO (polycystic ovaries)     Psychosis     Schizoaffective disorder     Substance abuse      Past Surgical History:   Procedure Laterality Date    CERVICAL SPINE SURGERY  2009    ACDF C4-5- Dr. Mark Anthony Escobar    COLONOSCOPY      KNEE SURGERY Right 2014    scope      General Information       Row Name 25 0901          Physical Therapy Time and Intention    Document Type evaluation  -SC     Mode of Treatment physical therapy  -SC       Row Name 25 0901          General Information    Prior Level of Function independent:;gait;transfer;all household mobility  no falls  -SC     Existing Precautions/Restrictions spinal;other (see comments)  drain. Cervial and throascic precautios  -SC       Row Name 25 09          Living Environment    Current Living Arrangements home  -SC     People in Home other (see comments)  lives with her father  -SC       Row Name 25 0901          Home Main Entrance    Number of Stairs, Main Entrance four  -SC     Stair Railings, Main Entrance railings safe and in good condition  -SC       Row Name 25 0901          Stairs Within Home, Primary    Number of Stairs, Within Home, Primary none  -SC       Row  Name 07/22/25 0901          Cognition    Orientation Status (Cognition) oriented x 4  -SC       Row Name 07/22/25 0901          Safety Issues/Impairments Affecting Functional Mobility    Impairments Affecting Function (Mobility) balance;endurance/activity tolerance;grasp;motor control;pain;range of motion (ROM);postural/trunk control;strength  -SC     Comment, Safety Issues/Impairments (Mobility) alert, following commands  -SC               User Key  (r) = Recorded By, (t) = Taken By, (c) = Cosigned By      Initials Name Provider Type    SC Robyn Michaud PT Physical Therapist                   Mobility       Row Name 07/22/25 0903          Bed Mobility    Bed Mobility supine-sit;scooting/bridging  -SC     Scooting/Bridging [place] (Bed Mobility) independent  -SC     Supine-Sit [place] (Bed Mobility) 2 person assist;verbal cues;moderate assist (50% patient effort)  -SC     Assistive Device (Bed Mobility) head of bed elevated;bed rails  -SC     Comment, (Bed Mobility) VC for sequencing log rolling. Requried assistance to right trunk  -SC       Row Name 07/22/25 0903          Transfers    Comment, (Transfers) cues for hand placement. Demostrated some difficulty with eccentric lowering  -SC       Row Name 07/22/25 0903          Sit-Stand Transfer    Sit-Stand [place] (Transfers) contact guard  -SC     Assistive Device (Sit-Stand Transfers) walker, front-wheeled  -SC       Row Name 07/22/25 0903          Gait/Stairs (Locomotion)    [place] Level (Gait) minimum assist (75% patient effort);1 person assist;1 person to manage equipment;verbal cues  -SC     Assistive Device (Gait) walker, front-wheeled  -SC     Distance in Feet (Gait) 120  -SC     Deviations/Abnormal Patterns (Gait) ataxic;gait speed decreased;weight shifting decreased  -SC     Comment, (Gait/Stairs) Gt training focused on controlling walker with upright posture. Demonstrated difficulty activating neck extension. Cues to upright  posture. Occational assist to control walker for safety..  -SC               User Key  (r) = Recorded By, (t) = Taken By, (c) = Cosigned By      Initials Name Provider Type    SC Robyn Michaud PT Physical Therapist                   Obj/Interventions       Row Name 07/22/25 0908          Range of Motion Comprehensive    General Range of Motion neck/trunk range of motion deficits identified  -SC     Comment, General Range of Motion neck limited 90%  -SC       Row Name 07/22/25 0908          Strength Comprehensive (MMT)    General Manual Muscle Testing (MMT) Assessment upper extremity strength deficits identified  -SC     Comment, General Manual Muscle Testing (MMT) Assessment weak  bilaterally see OT. B LE grossly  4+/5  -SC       Row Name 07/22/25 0908          Motor Skills    Motor Skills coordination  -SC     Coordination gross motor deficit;left;minimal impairment  -[facility] Name 07/22/25 0908          Balance    Balance Assessment standing dynamic balance  -SC     Dynamic Standing Balance 1-person assist;1 person to manage equipment;minimal assist  -SC     Position/Device Used, Standing Balance supported  -SC     Comment, Balance no LOB but difficulty with LE coordination  -SC               User Key  (r) = Recorded By, (t) = Taken By, (c) = Cosigned By      Initials Name Provider Type    SC Robyn Michaud PT Physical Therapist                   Goals/Plan       Row Name 07/22/25 0916          Bed Mobility Goal 1 (PT)    Activity/Assistive Device (Bed Mobility Goal 1, PT) sit to supine/supine to sit  -SC     [place] Level/Cues Needed (Bed Mobility Goal 1, PT) modified independence  -SC     Time Frame (Bed Mobility Goal 1, PT) long term goal (LTG);3 days  -SC       Row Name 07/22/25 0916          Transfer Goal 1 (PT)    Activity/Assistive Device (Transfer Goal 1, PT) sit-to-stand/stand-to-sit;walker, rolling  -SC     [place] Level/Cues Needed (Transfer Goal 1, PT) modified independence  -SC      Time Frame (Transfer Goal 1, PT) long term goal (LTG);3 days  -SC       Row Name 07/22/25 0916          Gait Training Goal 1 (PT)    Activity/Assistive Device (Gait Training Goal 1, PT) gait (walking locomotion);walker, rolling  -SC     [place] Level (Gait Training Goal 1, PT) modified independence  -SC     Time Frame (Gait Training Goal 1, PT) long term goal (LTG);3 days  -[facility] Name 07/22/25 0916          Stairs Goal 1 (PT)    Activity/Assistive Device (Stairs Goal 1, PT) stairs, all skills  -SC     [place] Level/Cues Needed (Stairs Goal 1, PT) contact guard required  -SC     Number of Stairs (Stairs Goal 1, PT) 4  -SC     Time Frame (Stairs Goal 1, PT) long term goal (LTG);10 days  -[facility] Name 07/22/25 0916          Therapy Assessment/Plan (PT)    Planned Therapy Interventions (PT) balance training;bed mobility training;gait training;home exercise program;ROM (range of motion);patient/family education;stair training;strengthening;stretching;transfer training  -SC               User Key  (r) = Recorded By, (t) = Taken By, (c) = Cosigned By      Initials Name Provider Type    SC Robyn Michaud, PT Physical Therapist                   Clinical Impression       Row Name 07/22/25 0911          Pain    Pain Location neck  -SC     Pain Side/Orientation posterior  -SC     Pain Management Interventions cold applied  -SC     Response to Pain Interventions activity participation with tolerable pain  -SC     Additional Documentation Pain Scale: FACES Pre/Post-Treatment (Group)  -SC       Row Name 07/22/25 0911          Pain Scale: FACES Pre/Post-Treatment    Pain: FACES Scale, Pretreatment 6-->hurts even more  -SC     Posttreatment Pain Rating 8-->hurts whole lot  -[facility] Name 07/22/25 0911          Plan of Care Review    Plan of Care Reviewed With patient  -SC     Progress improving  -SC     Outcome Evaluation Patient presents with painful neck, limited ROM and strength. She required  assistanced to get out of bed and walk. Recommend continued skilled PT and home with assistance at discharge  -SC       Row Name 07/22/25 0911          Therapy Assessment/Plan (PT)    Patient/Family Therapy Goals Statement (PT) get better  -SC     Rehab Potential (PT) good  -SC     Criteria for Skilled Interventions Met (PT) yes;meets criteria  -SC     Therapy Frequency (PT) daily  -SC     Predicted Duration of Therapy Intervention (PT) 3  -SC       Row Name 07/22/25 0911          Positioning and Restraints    Pre-Treatment Position in bed  -SC     Post Treatment Position chair  -SC     In Chair notified nsg;reclined;sitting;call light within reach;encouraged to call for assist;exit alarm on  -SC               User Key  (r) = Recorded By, (t) = Taken By, (c) = Cosigned By      Initials Name Provider Type    Robyn Devries PT Physical Therapist                   Outcome Measures       Row Name 07/22/25 0916          How much help from another person do you currently need...    Turning from your back to your side while in flat bed without using bedrails? 4  -SC     Moving from lying on back to sitting on the side of a flat bed without bedrails? 3  -SC     Moving to and from a bed to a chair (including a wheelchair)? 3  -SC     Standing up from a chair using your arms (e.g., wheelchair, bedside chair)? 3  -SC     Climbing 3-5 steps with a railing? 3  -SC     To walk in hospital room? 3  -SC     AM-PAC 6 Clicks Score (PT) 19  -SC     Highest Level of Mobility Goal Walk 10 Steps or More-6  -[facility] Name 07/22/25 0916          Functional Assessment    Outcome Measure Options AM-PAC 6 Clicks Basic Mobility (PT)  -SC               User Key  (r) = Recorded By, (t) = Taken By, (c) = Cosigned By      Initials Name Provider Type    Robyn Devries PT Physical Therapist                                 Physical Therapy Education       Title: PT OT SLP Therapies (Done)       Topic: Physical Therapy (Done)        Point: Mobility training (Done)       Learning Progress Summary            Patient TRISTAN Gomez, VU by SC at 7/22/2025 0917    Comment: reviewed precautions                      Point: Home exercise program (Done)       Learning Progress Summary            TRISTAN Lu VU by SC at 7/22/2025 0917    Comment: reviewed precautions                      Point: Body mechanics (Done)       Learning Progress Summary            Patient TRISTAN Gomez, VU by SC at 7/22/2025 0917    Comment: reviewed precautions                      Point: Precautions (Done)       Learning Progress Summary            Patient TRISTAN Gomez VU by SC at 7/22/2025 0917    Comment: reviewed precautions                                      User Key       Initials Effective Dates Name Provider Type Discipline    SC 02/03/23 -  Robyn Michaud, PT Physical Therapist PT                  PT Recommendation and Plan  Planned Therapy Interventions (PT): balance training, bed mobility training, gait training, home exercise program, ROM (range of motion), patient/family education, stair training, strengthening, stretching, transfer training  Progress: improving  Outcome Evaluation: Patient presents with painful neck, limited ROM and strength. She required assistanced to get out of bed and walk. Recommend continued skilled PT and home with assistance at discharge     Time Calculation:   PT Evaluation Complexity  History, PT Evaluation Complexity: 3 or more personal factors and/or comorbidities  Examination of Body Systems (PT Eval Complexity): total of 4 or more elements  Clinical Presentation (PT Evaluation Complexity): evolving  Clinical Decision Making (PT Evaluation Complexity): moderate complexity  Overall Complexity (PT Evaluation Complexity): moderate complexity     PT Charges       Row Name 07/22/25 0826             Time Calculation    Start Time 0826  -SC      PT Received On 07/22/25  -SC      PT Goal Re-Cert Due Date 08/03/25  -SC         Timed Charges     47515 - PT Therapeutic Exercise Minutes 5  -SC      69964 - Gait Training Minutes  10  -SC      43400 - PT Therapeutic Activity Minutes 10  -SC         Untimed Charges    PT Eval/Re-eval Minutes 25  -SC         Total Minutes    Timed Charges Total Minutes 25  -SC      Untimed Charges Total Minutes 25  -SC       Total Minutes 50  -SC                User Key  (r) = Recorded By, (t) = Taken By, (c) = Cosigned By      Initials Name Provider Type    SC Robyn Michaud, PT Physical Therapist                  Therapy Charges for Today       Code Description Service Date Service Provider Modifiers Qty    97831339992 HC GAIT TRAINING EA 15 MIN 7/22/2025 Robyn Michaud, PT GP 1    06625011973 HC PT THERAPEUTIC ACT EA 15 MIN 7/22/2025 Robyn Michaud, PT GP 1    79467876946 HC PT EVAL MOD COMPLEXITY 2 7/22/2025 Robyn Michaud, PT GP 1            PT G-Codes  Outcome Measure Options: AM-PAC 6 Clicks Basic Mobility (PT)  AM-PAC 6 Clicks Score (PT): 19  PT Discharge Summary  Anticipated Discharge Disposition (PT): home with assist    Robyn Michaud, PT  7/22/2025

## 2025-07-22 NOTE — PLAN OF CARE
Goal Outcome Evaluation:  Plan of Care Reviewed With: patient        Progress: improving  Outcome Evaluation: Pt has been successful at spontaneously voiding. STACI output this shift has been 100ml. Ambulates with walker and SB assist. PRN Percocet has been effective in pain relief. Dsg has scant dried drainage but otherwise C/D/I.

## 2025-07-22 NOTE — PROGRESS NOTES
NEUROSURGERY PROGRESS NOTE     LOS: 1 day   Patient Care Team:  Connie May APRN as PCP - General (Nurse Practitioner)    Chief Complaint: Upper extremity sensory alteration and weakness with gait dysfunction.    POD#: 1 Day Post-Op  Procedures:  C2-7 laminectomies with C2-T2 fusion and stabilization.    Interval History: Patient ambulated short distance last night.  Patient Complaints: Incisional pain.  Patient Denies: New numbness or weakness.    Vital Signs: Blood pressure 169/90, pulse 76, temperature 98 °F (36.7 °C), temperature source Oral, resp. rate 18, last menstrual period 07/14/2025, SpO2 98%, not currently breastfeeding.  Intake/Output:   Intake/Output Summary (Last 24 hours) at 7/22/2025 0547  Last data filed at 7/22/2025 0539  Gross per 24 hour   Intake 3730 ml   Output 5235 ml   Net -1505 ml     Drain output: 50/165 mL.    Physical Exam:  Patient is sitting up in bed and watching television.  She is in good spirits.  Dry dressing is in place on her incision.  She has some baseline mild decrease in her hand  strength.  Lower extremity strength is intact.     Assessment/Plan:  1.  Cervical spondylosis and stenosis with myelopathy status post extensive cervical decompression with fusion and stabilization C2-T2.  2.  Iron deficiency anemia.  3.  Bipolar disorder.  4.  Hypertension.  5.  Schizoaffective disorder.  6.  Tobacco abuse.  7.  Disposition: Mobilize patient.      Houston Danielson MD  07/22/25  05:47 EDT

## 2025-07-22 NOTE — PLAN OF CARE
Goal Outcome Evaluation:  Plan of Care Reviewed With: patient        Progress: improving  Outcome Evaluation: Patient presents with painful neck, limited ROM and strength. She required assistanced to get out of bed and walk. Recommend continued skilled PT and home with assistance at discharge    Anticipated Discharge Disposition (PT): home with assist

## 2025-07-22 NOTE — PLAN OF CARE
Problem: Adult Inpatient Plan of Care  Goal: Plan of Care Review  Recent Flowsheet Documentation  Taken 7/22/2025 0959 by Maryan Betancourt OT  Outcome Evaluation: OT IE completed. Pt is A/Ox4 and motivated to work with therapy. Education initiated for spinal precautions and logroll sequencing. Pt transitions to EOB sitting with Mod Ax2. Pt is up in room and transfering with RW support and Min Ax1, progressing to CGAx1. Appropriate AE issued and teaching initiated for spinal precautions and ADL retraining to maintain. OT will follow IP to promote self-care. Recommend home with family support when pt it medically ready.

## 2025-07-23 PROCEDURE — 25010000002 MORPHINE PER 10 MG: Performed by: NEUROLOGICAL SURGERY

## 2025-07-23 PROCEDURE — 97116 GAIT TRAINING THERAPY: CPT

## 2025-07-23 PROCEDURE — 99024 POSTOP FOLLOW-UP VISIT: CPT | Performed by: NEUROLOGICAL SURGERY

## 2025-07-23 PROCEDURE — 97535 SELF CARE MNGMENT TRAINING: CPT | Performed by: OCCUPATIONAL THERAPIST

## 2025-07-23 RX ADMIN — FAMOTIDINE 20 MG: 20 TABLET, FILM COATED ORAL at 09:18

## 2025-07-23 RX ADMIN — FAMOTIDINE 20 MG: 20 TABLET, FILM COATED ORAL at 20:31

## 2025-07-23 RX ADMIN — OXYCODONE HYDROCHLORIDE AND ACETAMINOPHEN 1 TABLET: 7.5; 325 TABLET ORAL at 20:31

## 2025-07-23 RX ADMIN — FERROUS SULFATE TAB 325 MG (65 MG ELEMENTAL FE) 325 MG: 325 (65 FE) TAB at 09:18

## 2025-07-23 RX ADMIN — HYDROCODONE BITARTRATE AND ACETAMINOPHEN 1 TABLET: 5; 325 TABLET ORAL at 02:51

## 2025-07-23 RX ADMIN — SENNOSIDES, DOCUSATE SODIUM 2 TABLET: 50; 8.6 TABLET, FILM COATED ORAL at 02:52

## 2025-07-23 RX ADMIN — ATENOLOL 25 MG: 25 TABLET ORAL at 09:18

## 2025-07-23 RX ADMIN — BISACODYL 5 MG: 5 TABLET, COATED ORAL at 11:26

## 2025-07-23 RX ADMIN — HYDROCODONE BITARTRATE AND ACETAMINOPHEN 1 TABLET: 5; 325 TABLET ORAL at 14:10

## 2025-07-23 RX ADMIN — NICOTINE 1 PATCH: 14 PATCH TRANSDERMAL at 20:34

## 2025-07-23 RX ADMIN — HYDROCODONE BITARTRATE AND ACETAMINOPHEN 1 TABLET: 5; 325 TABLET ORAL at 09:32

## 2025-07-23 RX ADMIN — OXYCODONE HYDROCHLORIDE AND ACETAMINOPHEN 1 TABLET: 7.5; 325 TABLET ORAL at 06:23

## 2025-07-23 RX ADMIN — ESCITALOPRAM 5 MG: 5 TABLET, FILM COATED ORAL at 20:31

## 2025-07-23 RX ADMIN — MORPHINE SULFATE 1 MG: 2 INJECTION, SOLUTION INTRAMUSCULAR; INTRAVENOUS at 17:06

## 2025-07-23 RX ADMIN — Medication 3 ML: at 09:19

## 2025-07-23 RX ADMIN — OLANZAPINE 5 MG: 5 TABLET, FILM COATED ORAL at 20:31

## 2025-07-23 RX ADMIN — Medication 3 ML: at 20:32

## 2025-07-23 NOTE — PLAN OF CARE
Patient ambulated in hallway with one assist approximately 100 ft. Border dressing CDI. 50 ml of drainage released from STACI. Takes Hydrocodone / Percocet alternately for effective pain management. Voids independently without difficulty. VSS on RA. Call light in reach.

## 2025-07-23 NOTE — PROGRESS NOTES
NEUROSURGERY PROGRESS NOTE     LOS: 2 days   Patient Care Team:  Connie May APRN as PCP - General (Nurse Practitioner)    Chief Complaint: Upper extremity sensory alteration and weakness with gait dysfunction.    POD#: 2 Days Post-Op  Procedures:  C2-7 laminectomies with C2-T2 fusion and stabilization.    Interval History:   Patient ambulated yesterday.  Patient Complaints: Incisional pain.  Patient Denies: Voiding difficulty.  Sensation is a bit improved in her right hand compared to the preop period.    Vital Signs: Blood pressure 168/79, pulse 84, temperature 98.1 °F (36.7 °C), temperature source Oral, resp. rate 16, last menstrual period 07/14/2025, SpO2 93%, not currently breastfeeding.  Intake/Output:   Intake/Output Summary (Last 24 hours) at 7/23/2025 0619  Last data filed at 7/23/2025 0518  Gross per 24 hour   Intake 720 ml   Output 1580 ml   Net -860 ml     Drain output: 120/60 mL.    Physical Exam:  The patient is sitting up in bed.  She appears a bit uncomfortable at the moment.  Dry dressing is in place on her incision.   strength is mildly weak bilaterally, unchanged.     Assessment/Plan:  1.  Cervical spondylosis and stenosis with myelopathy status post extensive cervical decompression with fusion and stabilization C2-T2.  2.  Iron deficiency anemia.  3.  Bipolar disorder.  4.  Hypertension.  5.  Schizoaffective disorder.  6.  Tobacco abuse.  7.  Disposition: Mobilize patient.  Discontinue drain later today.  Hopefully home tomorrow.      Houston Danielson MD  07/23/25  06:19 EDT

## 2025-07-23 NOTE — THERAPY TREATMENT NOTE
Patient Name: Jazzy Valle  : 1975    MRN: [id number]                              Today's Date: 2025       Admit Date: 2025    Visit Dx:     ICD-10-CM ICD-9-CM   1. Cervical spondylosis with myelopathy  M47.12 721.1     Patient Active Problem List   Diagnosis    Essential hypertension    Obesity (BMI 30-39.9)    Tobacco abuse    Chest pressure    Psychosis    Acute psychosis    Chronic residual schizophrenia with acute exacerbations    Methamphetamine use disorder, severe, dependence    Anemia, iron deficiency    Iron malabsorption    Cervical spondylosis with myelopathy    Cervical spondylolysis     Past Medical History:   Diagnosis Date    Anxiety     Bipolar disorder     Depression     Hernia of abdominal wall     Hypertension     PCO (polycystic ovaries)     Psychosis     Schizoaffective disorder     Substance abuse      Past Surgical History:   Procedure Laterality Date    CERVICAL DISCECTOMY POSTERIOR FUSION WITH BRAIN LAB N/A 2025    Procedure: CERVICAL DISCECTOMY LAMINECTOMY C2-C7 DECOMPRESSION POSTERIOR FUSION C2-T2  WITH INSTRUMENTATION;  Surgeon: Houston Danielson MD;  Location: [facility];  Service: Neurosurgery;  Laterality: N/A;    CERVICAL SPINE SURGERY  2009    ACDF C4-5- Dr. Mark Anthony Escobar    COLONOSCOPY      KNEE SURGERY Right 2014    scope      General Information       Row Name 25 1310          OT Time and Intention    Subjective Information complains of;pain  -TB     Document Type therapy note (daily note)  -TB     Mode of Treatment occupational therapy;individual therapy  -TB     Patient Effort good  -TB     Symptoms Noted During/After Treatment increased pain  -TB       Row Name 25 1310          General Information    Patient Profile Reviewed yes  -TB     Existing Precautions/Restrictions fall;spinal;other (see comments)  Cervical spine precautions, STACI drain  -TB     Barriers to Rehab medically complex  -TB       Row Name 25 1314           Cognition    Orientation Status (Cognition) oriented x 4  -TB       Row Name 07/23/25 1310          Safety Issues/Impairments Affecting Functional Mobility    Safety Issues Affecting Function (Mobility) insight into deficits/self-awareness;awareness of need for assistance;safety precaution awareness;safety precautions follow-through/compliance;sequencing abilities  -TB     Impairments Affecting Function (Mobility) balance;endurance/activity tolerance;pain;strength;range of motion (ROM);sensation/sensory awareness  -TB               User Key  (r) = Recorded By, (t) = Taken By, (c) = Cosigned By      Initials Name Provider Type    TB Maryan Betancourt, OT Occupational Therapist                     Mobility/ADL's       Row Name 07/23/25 1311          Bed Mobility    Bed Mobility sit-sidelying;rolling right;scooting/bridging  -TB     Rolling Right [place] (Bed Mobility) minimum assist (75% patient effort);1 person assist;verbal cues  -TB     Scooting/Bridging [place] (Bed Mobility) contact guard;verbal cues  -TB     Sit-Sidelying [place] (Bed Mobility) moderate assist (50% patient effort);verbal cues  -TB     Bed Mobility, Safety Issues decreased use of legs for bridging/pushing;decreased use of arms for pushing/pulling  -TB     Assistive Device (Bed Mobility) bed rails  -TB     Comment, (Bed Mobility) Education reviewed for spinal precautions and log roll sequencing to return to bed. Pt demonstrates good understanding.  -TB       Row Name 07/23/25 1311          Transfers    Transfers sit-stand transfer;stand-sit transfer  -TB     Comment, (Transfers) Education and cues for hand placement  -TB       Row Name 07/23/25 1311          Sit-Stand Transfer    Sit-Stand [place] (Transfers) contact guard;verbal cues  -TB     Assistive Device (Sit-Stand Transfers) walker, front-wheeled  -TB       Row Name 07/23/25 1311          Stand-Sit Transfer    Stand-Sit [place] (Transfers) contact guard;verbal  cues  -TB     Assistive Device (Stand-Sit Transfers) walker, front-wheeled  -TB       Row Name 07/23/25 1311          Functional Mobility    Functional Mobility- Ind. Level contact guard assist;verbal cues required  -TB     Functional Mobility- Device walker, front-wheeled  -TB     Functional Mobility-Distance (Feet) 75  -TB     Functional Mobility- Safety Issues balance decreased during turns;sequencing ability decreased  -     Functional Mobility- Comment Pt is up in room and hallway with good effort. No dizziness or LOB.  -TB     Patient was able to Ambulate yes  -TB       Row Name 07/23/25 1311          Activities of Daily Living    BADL Assessment/Intervention bathing;lower body dressing;feeding;upper body dressing;grooming  -TB       Row Name 07/23/25 1311          Bathing Assessment/Intervention    Walker Level (Bathing) lower body;bathing skills;set up  with AE  -TB     Position (Bathing) supported sitting  -TB     Comment, (Bathing) Teaching reviewed for spinal precautions and ADL retraining.  -TB       Row Name 07/23/25 1311          Upper Body Dressing Assessment/Training    Walker Level (Upper Body Dressing) don;pajama/robe;set up  -TB     Position (Upper Body Dressing) unsupported sitting  -TB       Row Name 07/23/25 1311          Lower Body Dressing Assessment/Training    Walker Level (Lower Body Dressing) lower body dressing skills;dependent (less than 25% patient effort)  -TB     Position (Lower Body Dressing) unsupported sitting  -TB     Comment, (Lower Body Dressing) Teaching reviewed for spinal precautions and ADL retraining. Follow up teaching needed. Limited by acute pain.  -TB       Row Name 07/23/25 1311          Self-Feeding Assessment/Training    Walker Level (Feeding) set up;liquids to mouth  -TB     Position (Feeding) sitting up in bed  -TB       Row Name 07/23/25 1311          Grooming Assessment/Training    Walker Level (Grooming) set up;wash face, hands   -TB     Position (Grooming) supported sitting  -TB               User Key  (r) = Recorded By, (t) = Taken By, (c) = Cosigned By      Initials Name Provider Type    TB Maryan Betancourt OT Occupational Therapist                   Obj/Interventions       Row Name 07/23/25 1316          Shoulder (Therapeutic Exercise)    Shoulder (Therapeutic Exercise) AROM (active range of motion)  -TB     Shoulder AROM (Therapeutic Exercise) bilateral;flexion;extension;supine;10 repetitions  -TB       Row Name 07/23/25 1316          Hand (Therapeutic Exercise)    Hand (Therapeutic Exercise) strengthening exercise  -TB     Hand Strengthening (Therapeutic Exercise)  strengthening;10 repetitions  Green Foam Block  -TB       Row Name 07/23/25 1316          Motor Skills    Therapeutic Exercise shoulder;hand  Pt completes BUE/hand HEP to support self-care.  -TB       Row Name 07/23/25 1316          Balance    Balance Assessment sitting static balance;sitting dynamic balance;sit to stand dynamic balance;standing static balance;standing dynamic balance  -TB     Static Sitting Balance supervision  -TB     Dynamic Sitting Balance standby assist  -TB     Position, Sitting Balance unsupported;sitting in chair;sitting edge of bed  -TB     Sit to Stand Dynamic Balance contact guard;verbal cues  -TB     Static Standing Balance contact guard  -TB     Dynamic Standing Balance contact guard;verbal cues  -TB     Position/Device Used, Standing Balance supported;walker, front-wheeled  -TB     Balance Interventions sitting;standing;sit to stand;supported;static;dynamic;dynamic reaching;occupation based/functional task  -TB     Comment, Balance Unsteady with change of directions, but no LOB.  -TB               User Key  (r) = Recorded By, (t) = Taken By, (c) = Cosigned By      Initials Name Provider Type    TB Maryan Betancourt OT Occupational Therapist                   Goals/Plan    No documentation.                  Clinical Impression        Row Name 07/23/25 1320          Pain Assessment    Pain Location neck  -TB     Pain Side/Orientation posterior  -TB     Pain Management Interventions cold applied;positioning techniques utilized;activity modification encouraged  -TB     Response to Pain Interventions activity participation with increased pain  -TB     Additional Documentation Pain Scale: FACES Pre/Post-Treatment (Group)  -TB       Row Name 07/23/25 1320          Pain Scale: FACES Pre/Post-Treatment    Pain: FACES Scale, Pretreatment 4-->hurts little more  -TB     Posttreatment Pain Rating 6-->hurts even more  -TB       Row Name 07/23/25 1320          Plan of Care Review    Plan of Care Reviewed With patient  -TB     Progress no change  -TB     Outcome Evaluation Pt participates in therapy with good effort. Remains limited by acute pain despite being pre-medicated. Up in room and hallway with RW support and CGAx1 x75'. No dizziness or LOB. Teaching reviewed for spinal precautions and ADL retraining. Follow up teaching and trial recommended. Pt completes BUE/hand HEPs to support self-care. OT will continue to follow IP. Recommend home with 24/7 assist when pt is medically ready. Pt reports that she can arrange to have assistance with her ADLs if needed at d/c.  -TB       Row Name 07/23/25 1320          Therapy Plan Review/Discharge Plan (OT)    Anticipated Discharge Disposition (OT) home with assist  -TB       Row Name 07/23/25 1320          Vital Signs    Pre Systolic BP Rehab --  RN cleared OT  -TB     O2 Delivery Pre Treatment room air  -TB     Pre Patient Position Sitting  -TB     Intra Patient Position Standing  -TB     Post Patient Position Supine  -TB       Row Name 07/23/25 1320          Positioning and Restraints    Pre-Treatment Position sitting in chair/recliner  -TB     Post Treatment Position bed  -TB     In Bed notified nsg;supine;call light within reach;encouraged to call for assist;exit alarm on;SCD pump applied  HOB 30 degrees   -TB               User Key  (r) = Recorded By, (t) = Taken By, (c) = Cosigned By      Initials Name Provider Type     Maryan Betancourt OT Occupational Therapist                   Outcome Measures       Row Name 07/23/25 1013 07/23/25 0932       How much help from another person do you currently need...    Turning from your back to your side while in flat bed without using bedrails? 3  -SC 3  -MA    Moving from lying on back to sitting on the side of a flat bed without bedrails? 3  -SC 3  -MA    Moving to and from a bed to a chair (including a wheelchair)? 3  -SC 3  -MA    Standing up from a chair using your arms (e.g., wheelchair, bedside chair)? 3  -SC 3  -MA    Climbing 3-5 steps with a railing? 3  -SC 3  -MA    To walk in hospital room? 3  -SC 3  -MA    AM-PAC 6 Clicks Score (PT) 18  -SC 18  -MA    Highest Level of Mobility Goal Walk 10 Steps or More-6  -SC Walk 10 Steps or More-6  -MA      Row Name 07/23/25 1013          Functional Assessment    Outcome Measure Options AM-PAC 6 Clicks Basic Mobility (PT)  -SC               User Key  (r) = Recorded By, (t) = Taken By, (c) = Cosigned By      Initials Name Provider Type    SC Robyn Michaud PT Physical Therapist    Alexandra Gutierrez, RN Registered Nurse                    Occupational Therapy Education       Title: PT OT SLP Therapies (In Progress)       Topic: Occupational Therapy (In Progress)       Point: ADL training (Done)       Learning Progress Summary            Patient Acceptance, E,D, VU,NR by TB at 7/23/2025 1322    Acceptance, E,D, VU,NR by TB at 7/22/2025 1004    Comment: Appropriate AE issued and teaching initiated for spinal precautions and ADL retraining to maintain. HEP issued and teaching initated.                      Point: Home exercise program (Done)       Learning Progress Summary            Patient Acceptance, E,D, VU,NR by TB at 7/23/2025 1322    Acceptance, E,D, VU,NR by TB at 7/22/2025 1004    Comment: Appropriate AE issued and  teaching initiated for spinal precautions and ADL retraining to maintain. HEP issued and teaching initated.                      Point: Precautions (Done)       Learning Progress Summary            Patient Acceptance, E,D, VU,NR by TB at 7/23/2025 1322    Acceptance, E,D, VU,NR by TB at 7/22/2025 1004    Comment: Appropriate AE issued and teaching initiated for spinal precautions and ADL retraining to maintain. HEP issued and teaching initated.                                      User Key       Initials Effective Dates Name Provider Type Discipline     07/11/23 -  Maryan Betancourt OT Occupational Therapist OT                  OT Recommendation and Plan  Therapy Frequency (OT): daily  Plan of Care Review  Plan of Care Reviewed With: patient  Progress: no change  Outcome Evaluation: Pt participates in therapy with good effort. Remains limited by acute pain despite being pre-medicated. Up in room and hallway with RW support and CGAx1 x75'. No dizziness or LOB. Teaching reviewed for spinal precautions and ADL retraining. Follow up teaching and trial recommended. Pt completes BUE/hand HEPs to support self-care. OT will continue to follow IP. Recommend home with 24/7 assist when pt is medically ready. Pt reports that she can arrange to have assistance with her ADLs if needed at d/c.     Time Calculation:   Evaluation Complexity (OT)  Review Occupational Profile/Medical/Therapy History Complexity: expanded/moderate complexity  Assessment, Occupational Performance/Identification of Deficit Complexity: 3-5 performance deficits  Clinical Decision Making Complexity (OT): detailed assessment/moderate complexity  Overall Complexity of Evaluation (OT): moderate complexity     Time Calculation- OT       Row Name 07/23/25 1130 07/23/25 0939          Time Calculation- OT    OT Start Time 1130  -TB --        Timed Charges    43364 - Gait Training Minutes  -- 8  -SC     54909 - OT Self Care/Mgmt Minutes 26 -TB --         Total Minutes    Timed Charges Total Minutes 26  -TB 8  -SC      Total Minutes 26  -TB 8  -SC               User Key  (r) = Recorded By, (t) = Taken By, (c) = Cosigned By      Initials Name Provider Type    SC Robyn Michaud PT Physical Therapist    TB Maryan Betancourt OT Occupational Therapist                  Therapy Charges for Today       Code Description Service Date Service Provider Modifiers Qty    40641782841 HC OT THER PROC EA 15 MIN 7/22/2025 Maryan Betancourt OT GO 1    45591156856 HC OT SELF CARE/MGMT/TRAIN EA 15 MIN 7/22/2025 Maryan Betancourt, OT GO 1    13820686481 HC OT EVAL MOD COMPLEXITY 3 7/22/2025 Maryan Betancourt OT GO 1    42647433788 HC OT SELF CARE/MGMT/TRAIN EA 15 MIN 7/23/2025 Maryan Betancourt, OT GO 2                 Maryan Betancourt OT  7/23/2025

## 2025-07-23 NOTE — THERAPY TREATMENT NOTE
Patient Name: Jazzy Valle  : 1975    MRN: [id number]                              Today's Date: 2025       Admit Date: 2025    Visit Dx:     ICD-10-CM ICD-9-CM   1. Cervical spondylosis with myelopathy  M47.12 721.1     Patient Active Problem List   Diagnosis    Essential hypertension    Obesity (BMI 30-39.9)    Tobacco abuse    Chest pressure    Psychosis    Acute psychosis    Chronic residual schizophrenia with acute exacerbations    Methamphetamine use disorder, severe, dependence    Anemia, iron deficiency    Iron malabsorption    Cervical spondylosis with myelopathy    Cervical spondylolysis     Past Medical History:   Diagnosis Date    Anxiety     Bipolar disorder     Depression     Hernia of abdominal wall     Hypertension     PCO (polycystic ovaries)     Psychosis     Schizoaffective disorder     Substance abuse      Past Surgical History:   Procedure Laterality Date    CERVICAL DISCECTOMY POSTERIOR FUSION WITH BRAIN LAB N/A 2025    Procedure: CERVICAL DISCECTOMY LAMINECTOMY C2-C7 DECOMPRESSION POSTERIOR FUSION C2-T2  WITH INSTRUMENTATION;  Surgeon: Houston Danielson MD;  Location: [facility];  Service: Neurosurgery;  Laterality: N/A;    CERVICAL SPINE SURGERY  2009    ACDF C4-5- Dr. Mark Anthony Escobar    COLONOSCOPY      KNEE SURGERY Right 2014    scope      General Information       Row Name 25 1004          Physical Therapy Time and Intention    Document Type therapy note (daily note)  -SC     Mode of Treatment physical therapy  -SC       Row Name 25 1004          General Information    Patient Profile Reviewed yes  -SC     Existing Precautions/Restrictions fall;spinal;other (see comments)  teressa drain  -SC       Row Name 25 1004          Cognition    Orientation Status (Cognition) oriented x 4  -SC       Row Name 25 1004          Safety Issues/Impairments Affecting Functional Mobility    Impairments Affecting Function (Mobility)  balance;endurance/activity tolerance;pain;strength;postural/trunk control;range of motion (ROM);sensation/sensory awareness  -SC     Comment, Safety Issues/Impairments (Mobility) alert, following commands c/o neck pain dispite premedication  -SC               User Key  (r) = Recorded By, (t) = Taken By, (c) = Cosigned By      Initials Name Provider Type    SC Robyn Michaud PT Physical Therapist                   Mobility       Row Name 07/23/25 1006          Bed Mobility    Bed Mobility supine-sit;scooting/bridging  -SC     Scooting/Bridging [place] (Bed Mobility) modified independence;verbal cues  -SC     Supine-Sit [place] (Bed Mobility) verbal cues;minimum assist (75% patient effort)  -SC     Assistive Device (Bed Mobility) head of bed elevated;bed rails  -SC     Comment, (Bed Mobility) worked on log rolling oob. extra time and assistance needed  -SC       Row Name 07/23/25 1006          Transfers    Comment, (Transfers) cues for hand placement getting of bed and commode. Demonstrated good control  -SC       Row Name 07/23/25 1006          Sit-Stand Transfer    Sit-Stand [place] (Transfers) contact guard;verbal cues  -SC     Assistive Device (Sit-Stand Transfers) walker, front-wheeled  -SC       Row Name 07/23/25 1006          Gait/Stairs (Locomotion)    [place] Level (Gait) 1 person assist;contact guard;verbal cues  -SC     Assistive Device (Gait) walker, front-wheeled  -SC     Distance in Feet (Gait) 170  -SC     Deviations/Abnormal Patterns (Gait) base of support, wide;gait speed decreased;weight shifting decreased  R trunk latral flexion  -SC     Bilateral Gait Deviations forward flexed posture  -SC     Comment, (Gait/Stairs) Gt training focused on controling walker with step through gait pattern. Encouraged upright posture. Patient demonstrated dificulty activating neck extension  -SC               User Key  (r) = Recorded By, (t) = Taken By, (c) = Cosigned By      Initials Name  Provider Type    SC Robyn Michaud PT Physical Therapist                   Obj/Interventions       [facility] Name 07/23/25 1010          Motor Skills    Therapeutic Exercise knee;ankle;shoulder;hip  -SC       Row Name 07/23/25 1010          Shoulder (Therapeutic Exercise)    Shoulder (Therapeutic Exercise) AROM (active range of motion)  -SC     Shoulder AROM (Therapeutic Exercise) flexion;extension;supine;aBduction;aDduction;10 repetitions  -SC       Row Name 07/23/25 1010          Knee (Therapeutic Exercise)    Knee (Therapeutic Exercise) isometric exercises;AROM (active range of motion)  -SC     Knee AROM (Therapeutic Exercise) bilateral;flexion;extension;10 repetitions;supine  -SC     Knee Isometrics (Therapeutic Exercise) bilateral;gluteal sets;10 second hold;10 repetitions  -SC       Row Name 07/23/25 1010          Ankle (Therapeutic Exercise)    Ankle (Therapeutic Exercise) AROM (active range of motion)  -SC     Ankle AROM (Therapeutic Exercise) bilateral;dorsiflexion;plantarflexion;10 repetitions  -SC       Row Name 07/23/25 1010          Balance    Balance Assessment standing dynamic balance  -SC     Dynamic Standing Balance 1-person assist;contact guard  -SC     Position/Device Used, Standing Balance supported  -SC     Comment, Balance No LOB-unsteady  -SC               User Key  (r) = Recorded By, (t) = Taken By, (c) = Cosigned By      Initials Name Provider Type    SC Robyn Michaud PT Physical Therapist                   Goals/Plan    No documentation.                  Clinical Impression       [facility] Name 07/23/25 1011          Pain    Pain Location neck  -SC     Pain Side/Orientation posterior  -SC     Pain Management Interventions positioning techniques utilized  -SC     Response to Pain Interventions activity participation with tolerable pain  -[facility] Name 07/23/25 1011          Pain Scale: FACES Pre/Post-Treatment    Pain: FACES Scale, Pretreatment 4-->hurts little more  -SC     Posttreatment Pain  Rating 8-->hurts whole lot  -[facility] Name 07/23/25 1011          Plan of Care Review    Plan of Care Reviewed With patient  -SC     Outcome Evaluation Patient demonstrated improving control of walker. Her pain remains high, making getting out of bed difficult  -[facility] Name 07/23/25 1011          Therapy Assessment/Plan (PT)    Patient/Family Therapy Goals Statement (PT) get better  -SC     Rehab Potential (PT) good  -SC     Criteria for Skilled Interventions Met (PT) yes;meets criteria  -SC     Therapy Frequency (PT) daily  -[facility] Name 07/23/25 1011          Positioning and Restraints    Pre-Treatment Position in bed  -SC     Post Treatment Position bed  -SC     In Chair notified nsg;reclined;sitting;call light within reach;encouraged to call for assist;exit alarm on  -SC               User Key  (r) = Recorded By, (t) = Taken By, (c) = Cosigned By      Initials Name Provider Type    Robyn Devries, PT Physical Therapist                   Outcome Measures       [facility] Name 07/23/25 1013          How much help from another person do you currently need...    Turning from your back to your side while in flat bed without using bedrails? 3  -SC     Moving from lying on back to sitting on the side of a flat bed without bedrails? 3  -SC     Moving to and from a bed to a chair (including a wheelchair)? 3  -SC     Standing up from a chair using your arms (e.g., wheelchair, bedside chair)? 3  -SC     Climbing 3-5 steps with a railing? 3  -SC     To walk in hospital room? 3  -SC     AM-PAC 6 Clicks Score (PT) 18  -SC     Highest Level of Mobility Goal Walk 10 Steps or More-6  -[facility] Name 07/23/25 1013          Functional Assessment    Outcome Measure Options AM-PAC 6 Clicks Basic Mobility (PT)  -SC               User Key  (r) = Recorded By, (t) = Taken By, (c) = Cosigned By      Initials Name Provider Type    Robyn Devries, PT Physical Therapist                                 Physical Therapy  Education       Title: PT OT SLP Therapies (In Progress)       Topic: Physical Therapy (Done)       Point: Mobility training (Done)       Learning Progress Summary            Patient Eager, E, VU by SC at 7/23/2025 1013    Comment: reviewed HEP    Eager, E, VU by SC at 7/22/2025 0917    Comment: reviewed precautions                      Point: Home exercise program (Done)       Learning Progress Summary            Patient Eager, E, VU by SC at 7/23/2025 1013    Comment: reviewed HEP    Eager, E, VU by SC at 7/22/2025 0917    Comment: reviewed precautions                      Point: Body mechanics (Done)       Learning Progress Summary            Patient Eager, E, VU by SC at 7/23/2025 1013    Comment: reviewed HEP    Eager, E, VU by SC at 7/22/2025 0917    Comment: reviewed precautions                      Point: Precautions (Done)       Learning Progress Summary            Patient Eager, E, VU by SC at 7/23/2025 1013    Comment: reviewed HEP    Eager, E, VU by SC at 7/22/2025 0917    Comment: reviewed precautions                                      User Key       Initials Effective Dates Name Provider Type Discipline    SC 02/03/23 -  Robyn Michaud, PT Physical Therapist PT                  PT Recommendation and Plan  Planned Therapy Interventions (PT): balance training, bed mobility training, gait training, home exercise program, ROM (range of motion), patient/family education, stair training, strengthening, stretching, transfer training  Progress: improving  Outcome Evaluation: Patient demonstrated improving control of walker. Her pain remains high, making getting out of bed difficult     Time Calculation:   PT Evaluation Complexity  History, PT Evaluation Complexity: 3 or more personal factors and/or comorbidities  Examination of Body Systems (PT Eval Complexity): total of 4 or more elements  Clinical Presentation (PT Evaluation Complexity): evolving  Clinical Decision Making (PT Evaluation Complexity):  moderate complexity  Overall Complexity (PT Evaluation Complexity): moderate complexity     PT Charges       Row Name 07/23/25 0939             Time Calculation    Start Time 0939  -SC      PT Received On 07/23/25  -SC      PT Goal Re-Cert Due Date 08/03/25  -SC         Timed Charges    27567 - PT Therapeutic Exercise Minutes 7  -SC      66434 - Gait Training Minutes  8  -SC      26231 - PT Therapeutic Activity Minutes 3  -SC         Total Minutes    Timed Charges Total Minutes 18  -SC       Total Minutes 18  -SC                User Key  (r) = Recorded By, (t) = Taken By, (c) = Cosigned By      Initials Name Provider Type    SC Robyn Michaud PT Physical Therapist                  Therapy Charges for Today       Code Description Service Date Service Provider Modifiers Qty    02113879267 HC GAIT TRAINING EA 15 MIN 7/22/2025 Robyn Michaud, PT GP 1    34514131407 HC PT THERAPEUTIC ACT EA 15 MIN 7/22/2025 Robyn Michaud, PT GP 1    46114944592 HC PT EVAL MOD COMPLEXITY 2 7/22/2025 Robyn Michaud, PT GP 1    03195394932 HC GAIT TRAINING EA 15 MIN 7/23/2025 Robyn Michaud, PT GP 1            PT G-Codes  Outcome Measure Options: AM-PAC 6 Clicks Basic Mobility (PT)  AM-PAC 6 Clicks Score (PT): 18  AM-PAC 6 Clicks Score (OT): 15  PT Discharge Summary  Anticipated Discharge Disposition (PT): home with assist    Robyn Michaud, PT  7/23/2025

## 2025-07-23 NOTE — CASE MANAGEMENT/SOCIAL WORK
Continued Stay Note   [place]     Patient Name: Jazzy Valle  MRN: [id number]  Today's Date: 7/23/2025    Admit Date: 7/21/2025    Plan: home   Discharge Plan       Row Name 07/23/25 1053       Plan    Plan Comments Therapy is recommending home at dc. Plan to pull drain today with plan to dc in the AM. CM will follow for any dc needs                   Discharge Codes    No documentation.                       Cecilia Ramirez RN

## 2025-07-23 NOTE — PLAN OF CARE
Our hospital scheduling office received a call this morning from the patient's family member who expressed concern about the patient's potential discharge tomorrow.  The family member, who is not present in the hospital, stated that they were concerned that the patient would not be able to go home as she lives with her elderly father.  I went over to speak with the patient and ask if she had any questions or concerns regarding going home.  The patient is willing to go home and feels that she will be able to manage.  I explained to the patient that when she is medically ready for discharge, it would be inappropriate to keep her on in the hospital for other reasons.  She is understanding of this.  The patient was not aware that a family member was contacting our office.  Please reach out for any further questions or concerns.

## 2025-07-23 NOTE — PAYOR COMM NOTE
"Alisa Suarez RN  [facility]  Utilization Management  P:[phone]  F:[phone]    Auth # [id number]     Jazzy Baca (49 y.o. Female)       Date of Birth   1975    Social Security Number       Address   [address] [place] [address]    Home Phone   [phone]    MRN   [id number]       Mandaeism   Uatsdin    Marital Status   Single                            Admission Date   7/21/2025    Admission Type   Elective    Admitting Provider   Houston Danielson MD    Attending Provider   Houston Danielson MD    Department, Room/Bed   [facility] 3H, S379/1       Discharge Date       Discharge Disposition       Discharge Destination                                 Attending Provider: Houston Danielson MD    Allergies: Abilify [Aripiprazole], Cymbalta [Duloxetine Hcl]    Isolation: None   Infection: None   Code Status: CPR    Ht: 160 cm (63\")   Wt: 96.2 kg (212 lb)    Admission Cmt: None   Principal Problem: Cervical spondylosis with myelopathy [M47.12]                   Active Insurance as of 7/21/2025       Primary Coverage       Payor Plan Insurance Group Employer/Plan Group    AETNA MEDICARE REPLACEMENT AETNA MEDICARE [place] [id number]       Payor Plan Address Payor Plan Phone Number Payor Plan Fax Number Effective Dates    [address] [phone]  1/1/2025 - None Entered    [facility] [id number]         Subscriber Name Subscriber Birth Date Member ID       JAZZY BACA 1975 [id number]                     Emergency Contacts        (Rel.) Home Phone Work Phone Mobile Phone    Davie Baca (Father) -- -- [phone]    IVELISSE BARKER (Relative) [phone] -- --                 History & Physical        Esmer Kendall APRN at 07/21/25 0833       Attestation signed by Houston Danielson MD at 07/21/25 0906      .                      Pre-Op H&P  Jazzy Baca  [id number]  1975      Chief complaint: Neck " pain      Subjective:  Patient is a 49 y.o.female presents for scheduled surgery by Dr. Danielson. She anticipates a CERVICAL DISCECTOMY LAMINECTOMY C2-C7 DECOMPRESSION POSTERIOR FUSION C2 - T2  WITH INSTRUMENTATION today. She reports neck pain for 6-7 months. The pain radiates down BUE. She had tightness in her hands that causes trouble using them.     Per office note 6/20/25: (((MRI of the cervical spine dated 2/26/2025 demonstrates the prior uninstrumented fusion at C4-5. There is diffuse significant stenosis throughout the entire cervical spine. There is diffuse spondylosis and disc disease. The narrowing is present from the mid C2 level down to the upper C7 level. There is reina signal change within the cord at C3-4.)))       Review of Systems:  Constitutional-- No fever, chills or sweats. No fatigue.  CV-- No chest pain, palpitation or syncope. +HTN  Resp-- No SOB, cough, hemoptysis  Skin--No rashes or lesions      Allergies:   Allergies   Allergen Reactions    Abilify [Aripiprazole] Mental Status Change    Cymbalta [Duloxetine Hcl] Other (See Comments)     Hot all over.         Home Meds:  Medications Prior to Admission   Medication Sig Dispense Refill Last Dose/Taking    atenolol (TENORMIN) 50 MG tablet Take 1 tablet by mouth Daily. (Patient taking differently: Take 0.5 tablets by mouth Daily.) 30 tablet 0     Chlorhexidine Gluconate 4 % solution Shower each day with solution for 5 days beginning 5 days before surgery. 120 mL 0     escitalopram (Lexapro) 5 MG tablet Take 1 tablet by mouth Daily. 30 tablet 1     ferrous sulfate 325 (65 FE) MG tablet Take 1 tablet by mouth Daily With Breakfast. 30 tablet 2     mupirocin (BACTROBAN) 2 % nasal ointment Apply to the inside of each nostril with a cotton swab two times daily, morning and evening, for 5 days before surgery. 10 each 0     OLANZapine (ZyPREXA) 5 MG tablet Take 1 tablet by mouth Every Night. 30 tablet 1          PMH:   Past Medical History:   Diagnosis  Date    Anxiety     Bipolar disorder     Depression     Hernia of abdominal wall     Hypertension     PCO (polycystic ovaries)     Psychosis     Schizoaffective disorder     Substance abuse      PSH:    Past Surgical History:   Procedure Laterality Date    CERVICAL SPINE SURGERY  03/21/2009    ACDF C4-5- Dr. Mark Anthony Escobar    COLONOSCOPY      KNEE SURGERY Right 2014    scope       Immunization History:  Influenza: No  Pneumococcal: No  Tetanus: UTD    Social History:   Tobacco:   Social History     Tobacco Use   Smoking Status Every Day    Current packs/day: 0.25    Average packs/day: 1 pack/day for 30.6 years (30.3 ttl pk-yrs)    Types: Cigarettes    Start date: 1995    Passive exposure: Current   Smokeless Tobacco Never   Tobacco Comments    1-2 cigarettes as of 7-      Alcohol:     Social History     Substance and Sexual Activity   Alcohol Use No         Physical Exam:/89 (BP Location: Right arm, Patient Position: Lying)   Pulse 77   Temp 97.8 °F (36.6 °C) (Temporal)   Resp 18   SpO2 97%       General Appearance:    Alert, cooperative, no distress, appears stated age   Head:    Normocephalic, without obvious abnormality, atraumatic   Lungs:     Clear to auscultation bilaterally, respirations unlabored    Heart:   Regular rate and rhythm, S1 and S2 normal    Abdomen:    Soft without tenderness   Extremities:   Extremities normal, atraumatic, no cyanosis or edema   Skin:   Skin color, texture, turgor normal, no rashes or lesions   Neurologic:   Grossly intact     Results Review:     LABS:  Lab Results   Component Value Date    WBC 7.47 07/14/2025    HGB 11.6 (L) 07/14/2025    HCT 36.6 07/14/2025    MCV 89.1 07/14/2025     07/14/2025    NEUTROABS 4.51 04/17/2025    GLUCOSE 148 (H) 12/19/2023    BUN 6 12/19/2023    CREATININE 0.79 12/19/2023    EGFRIFNONA 81 09/22/2021     12/19/2023    K 3.8 07/14/2025     12/19/2023    CO2 26.8 12/19/2023    MG 1.8 09/22/2021    CALCIUM 9.1  12/19/2023    ALBUMIN 4.0 12/19/2023    AST 31 12/19/2023    ALT 31 12/19/2023    BILITOT 0.2 12/19/2023       RADIOLOGY:    Study Result    Narrative & Impression   EXAMINATION: MRI CERVICAL SPINE WO CONTRAST-      CLINICAL INDICATION:  Hx of ACDF C4-5 in 2008; G95.9-Disease of spinal  cord, unspecified      TECHNIQUE: Sagittal spin echo scans were obtained from the posterior  fossa through the upper thoracic spine and axial scans were performed  through selected cervical disc space levels, utilizing both spin echo  and gradient echo scans technique without contrast administration.     COMPARISON: NONE         FINDINGS:     HARDWARE:  Interbody disc fusion with interbody spacer at the C4-C5 level.     ALIGNMENT:  Alignment is unremarkable. No listhesis.  Lordosis is preserved.     SPINAL CORD:?  Focal increased signal involving the cord at the C3/4 level in the  setting of stenosis and may represent cord myelomalacia versus possible  cord edema.     BONES:  No fracture. No marrow signal abnormality.     POSTERIOR ELEMENTS:  Congenital shortening of pedicles contributes to generalized stenosis.     POSTERIOR FOSSA:  The partially imaged posterior fossa is unremarkable.     SOFT TISSUES:  The visualized paraspinal soft tissues are unremarkable.       C1/2 Articulation: No significant arthritis.  Atlanto-occipital articulation: No significant arthritis.     DISC SPACES/NEUROFORAMINA:     C2/3: Annular disc bulge. No stenosis.  C3/4: Broad-based posterior disc osteophyte complex. Shortening of the  pedicles. Moderate central canal stenosis. Severe right greater than  left bilateral neural foraminal stenosis.  C4/5: Interbody disc fusion with interbody spacer. Mild-moderate right  greater than left neural foraminal stenosis. Mild central canal  stenosis.  C5/6: Broad-based posterior disc osteophyte complex eccentric to right.  Mild central canal stenosis. Severe right neuroforaminal stenosis. No  left neuroforaminal  stenosis.  C6/7: Broad-based posterior disc osteophyte complex eccentric to right.  Bilateral facet arthropathy. Moderate central canal stenosis. Severe  bilateral neuroforaminal stenosis.  C7/T1: Broad-based posterior disc osteophyte complex. Bilateral facet  arthropathy. No central canal stenosis. Moderate bilateral  neuroforaminal stenosis.     OTHER:  No additional remarkable findings.     IMPRESSION:  1. Multilevel degenerative disc disease, shortening the pedicles, and  facet arthropathy resulting in multilevel central canal and  neuroforaminal stenosis at levels detailed above.  2. Postsurgical changes from interbody fusion at the C4-C5 level.  3. Focal increased fluid signal involving the spinal cord at the C3/4  level in the setting of stenosis. May represent myelomalacia or cord  edema.  4. No fracture or traumatic malalignment.       I reviewed the patient's new clinical results.    Cancer Staging (if applicable)  Cancer Patient: __ yes __no __unknown; If yes, clinical stage T:__ N:__M:__, stage group or __N/A      Impression: Cervical spondylosis with myelopathy      Plan: CERVICAL DISCECTOMY LAMINECTOMY C2-C7 DECOMPRESSION POSTERIOR FUSION C2 - T2  WITH INSTRUMENTATION      RICARDO Quiroz   7/21/2025   08:33 EDT    Electronically signed by Houston Danielson MD at 07/21/25 0906       Current Facility-Administered Medications   Medication Dose Route Frequency Provider Last Rate Last Admin    acetaminophen (TYLENOL) tablet 650 mg  650 mg Oral Q4H PRN Houston Danielson MD        Or    acetaminophen (TYLENOL) 160 MG/5ML oral solution 650 mg  650 mg Oral Q4H PRN Houston Danielson MD        Or    acetaminophen (TYLENOL) suppository 650 mg  650 mg Rectal Q4H PRN Houston Danielson MD        atenolol (TENORMIN) tablet 25 mg  25 mg Oral Daily Houston Danielson MD   25 mg at 07/23/25 0918    sennosides-docusate (PERICOLACE) 8.6-50 MG per tablet 2 tablet  2 tablet Oral BID PRN Houston Danielson MD   2 tablet  at 07/23/25 0252    And    polyethylene glycol (MIRALAX) packet 17 g  17 g Oral Daily PRN Houston Danielson MD        And    bisacodyl (DULCOLAX) EC tablet 5 mg  5 mg Oral Daily PRN Houston Danielson MD   5 mg at 07/23/25 1126    And    bisacodyl (DULCOLAX) suppository 10 mg  10 mg Rectal Daily PRN Houston Danielson MD        diphenhydrAMINE (BENADRYL) capsule 25 mg  25 mg Oral Nightly PRN Houston Danielson MD        escitalopram (LEXAPRO) tablet 5 mg  5 mg Oral Daily Houston Danielson MD   5 mg at 07/21/25 1958    famotidine (PEPCID) tablet 20 mg  20 mg Oral Q12H Houston Danielson MD   20 mg at 07/23/25 0918    ferrous sulfate tablet 325 mg  325 mg Oral Daily With Breakfast Houston Danielson MD   325 mg at 07/23/25 0918    HYDROcodone-acetaminophen (NORCO) 5-325 MG per tablet 1 tablet  1 tablet Oral Q4H PRN Houston Danielson MD   1 tablet at 07/23/25 0932    morphine injection 1 mg  1 mg Intravenous Q2H PRN Houston Danielson MD   1 mg at 07/22/25 1352    And    naloxone (NARCAN) injection 0.4 mg  0.4 mg Intravenous Q5 Min PRN Houston Danielson MD        nicotine (NICODERM CQ) 14 MG/24HR patch 1 patch  1 patch Transdermal Q24H Cindi Iverson PA-C   1 patch at 07/22/25 1933    OLANZapine (zyPREXA) tablet 5 mg  5 mg Oral Nightly Houston Danielson MD   5 mg at 07/22/25 1933    ondansetron (ZOFRAN) injection 4 mg  4 mg Intravenous Q6H PRN Houston Danielson MD        oxyCODONE-acetaminophen (PERCOCET) 7.5-325 MG per tablet 1 tablet  1 tablet Oral Q4H PRN Houston Danielson MD   1 tablet at 07/23/25 0623    sodium chloride 0.9 % flush 10 mL  10 mL Intravenous PRN Houston Danielson MD        sodium chloride 0.9 % flush 3 mL  3 mL Intravenous Q12H Houston Danielson MD   3 mL at 07/23/25 0919    sodium chloride 0.9 % infusion 40 mL  40 mL Intravenous PRHouston Ayala MD         Lab Results (all)       Procedure Component Value Units Date/Time    POC Urine Pregnancy [[id number]]  (Normal) Collected: 07/21/25 0848     Specimen: Urine Updated: 07/21/25 0848     HCG, Urine, QL Negative     Lot Number [id number]     Internal Positive Control Passed     Internal Negative Control Passed     Expiration Date 10/17/26          Imaging Results (All)       Procedure Component Value Units Date/Time    FL O Arm During Surgery [[id number]] Resulted: 07/21/25 1306     Updated: 07/21/25 1306    Narrative:      This procedure was auto-finalized with no dictation required.    FL O Arm During Surgery [[id number]] Resulted: 07/21/25 1053     Updated: 07/21/25 1053    Narrative:      This procedure was auto-finalized with no dictation required.             Operative/Procedure Notes (all)        Houston Danielson MD at 07/21/25 1033  Version 2 of 2         NEUROSURGICAL OPERATIVE NOTE        PREOPERATIVE DIAGNOSIS:    Cervical spondylosis and stenosis with myelopathy      POSTOPERATIVE DIAGNOSIS:  Same      PROCEDURE:  1.  Arthrodesis posterior lateral type C2-T2  2.  Segmental screw isabella fixation C2-T2 utilizing Infinity system  3.  C2-C7 laminectomies  4.  Use of MagnetOs and local autograft  5.  Stealth stereotaxy utilizing conjunction with O arm imaging      SURGEON:  Houston Danielson M.D.      ASSISTANT: Cindi Iverson PA-C    PAC assisted with:   Suctioning   Retraction   Tying   Suturing   Closing   Application of dressing   Skilled neurosurgery PA assistance was necessary to perform this procedure.        ANESTHESIA:  General      ESTIMATED BLOOD LOSS: 125 mL      SPECIMEN: None      DRAINS: 7 flat STACI      COMPLICATIONS:  None      Spinal Surgery Levels Completed:6+ Levels        CLINICAL NOTE:  The patient is a 49-year-old woman who remotely underwent anterior cervical surgery by one of my former colleagues.  More recently she has developed progressive neck pain with numbness and weakness in her hands and arms.  She has had altered gait.  She is frankly myelopathic on examination.  Studies demonstrate diffuse cervical spondylosis and stenosis with  cord compromise at multiple levels.  As such, she presents at this time for multilevel decompression with fusion and stabilization from C2-T2.  The nature of the procedure as well as the potential risks, complications, limitations, and alternatives to the procedure were discussed at length with the patient and the patient has agreed to proceed with surgery.      TECHNICAL NOTE:  The patient was brought to the operating room and while on her cart, general endotracheal anesthesia was achieved.  Sensory motor spinal cord monitoring was performed; baselines were obtained, which demonstrated some decreased potentials in the left arm and in the right leg.  Okarche head mancilla was affixed to her head via 3-point pin fixation.  She was then log-rolled onto the Pk table.  Special care was ensured to protect pressure points.  Her arms were padded and tucked at her sides.  Her head was maintained in a neutral in-line position and head mancilla was secured to the operating room table.  She did receive preoperative antibiotics and Decadron.  Her suboccipital region as well as her dorsal neck and upper thoracic region were prepared and draped in the usual fashion.  O-arm imaging ensued.  These images were downloaded into the [employer] [facility] using Stealth stereotaxy.  The upper and lower extent of the incision to be fashioned was marked.  A lengthy midline incision from the suboccipital region into the upper thoracic incision was fashioned.  Posterior spinal elements were exposed with cautery.  The exposure was from C2 to T2.  Periodic spinal cord potentials were checked and remained stable.  Using Stealth frameless stereotaxy, each of the pedicle screw hole sites at C2, C7, T1, and T2, were marked, drilled and tapped.  The iAdvize system was utilized.  All screws placed were 3.5 mm and varied in length from 24 to 32 mm.  Good purchase was achieved.  Spinous processes at C3, C4, C5, C6, and the upper half of C7 were removed.    holes were then placed within the lateral masses at C3, C4, C5 on each side.  These holes were drilled, tapped, all screws placed bilaterally at C3, C4, C5, with 3-4 mm in diameter and varying from 14 to 16 mm in length.  Potentials were once again checked and potentials in the right hand and right foot were further diminished.  O-arm imaging ensued again which confirmed good positioning of the hardware without any aberrant screw placement.  The lateral masses and the lamina from C2 to T2 were decorticated.  Infinity rods were contoured within the screw heads from C2 to T2 segmentally.  Set screws were applied and tightened per routine on each side.  Throughout the case, the patient's blood pressure was maintained in a normal range.  Spinous process at C2 was removed and then the drill was utilized to thin out the lamina from C2 to the upper half of C7.  The bone was thinned such that a 2 mm Kerrison punch was utilized to remove the thin of the bone that was left in overgrown ligament.  Greater than 50% of the lamina at C7 was resected.  Complete laminectomies were performed at C2, C3, C4, C5, and C6 as well.  Good decompression of the thecal sac was accomplished.  After the decompression, potentials were once again checked and had returned to their preoperative baseline.  Bleeding points were controlled with bone wax and Floseal.  A 7 flat STACI drain was brought in through a separate stab incision and left in the epidural space.  Vancomycin powder was sprinkled in the depths of the wound and more superficially as the wound was closed.  The paraspinous muscle and fascia were reapproximated in interrupted fashion with 0 Vicryl suture.  Exparel diluted 50/50 with saline was instilled in the paraspinous musculature and subcutaneous tissues.  Subcutaneous tissues were further closed in interrupted fashion with 2-0 followed by 3-0 Vicryl suture.  Skin was closed in a running locked fashion with 3-0 nylon suture.   Sterile dressing was applied.  The patient was rolled onto the bed, the head mancilla removed, she was extubated and taken to the recovery room in satisfactory condition.  There were no overt intraoperative complications.              Houston Danielson M.D.      Electronically signed by Houston Danielson MD at 07/22/25 0635       Houston Danielson MD at 07/21/25 1033  Version 1 of 2         NEUROSURGICAL OPERATIVE NOTE        PREOPERATIVE DIAGNOSIS:    Cervical spondylosis and stenosis with myelopathy      POSTOPERATIVE DIAGNOSIS:  Same      PROCEDURE:  1.  Arthrodesis posterior lateral type C2-T2  2.  Segmental screw isabella fixation C2-T2 utilizing Infinity system  3.  C2-C7 laminectomies  4.  Use of MagnetOs and local autograft  5.  Stealth stereotaxy utilizing conjunction with O arm imaging      SURGEON:  Houston Danielson M.D.      ASSISTANT: Cindi Iverson PA-C    PAC assisted with:   Suctioning   Retraction   Tying   Suturing   Closing   Application of dressing   Skilled neurosurgery PA assistance was necessary to perform this procedure.        ANESTHESIA:  General      ESTIMATED BLOOD LOSS: 125 mL      SPECIMEN: None      DRAINS: 7 flat STACI      COMPLICATIONS:  None      Spinal Surgery Levels Completed:6+ Levels        CLINICAL NOTE:  The patient is a 49-year-old woman who remotely underwent anterior cervical surgery by one of my former colleagues.  More recently she has developed progressive neck pain with numbness and weakness in her hands and arms.  She has had altered gait.  She is frankly myelopathic on examination.  Studies demonstrate diffuse cervical spondylosis and stenosis with cord compromise at multiple levels.  As such, she presents at this time for multilevel decompression with fusion and stabilization from C2-T2.  The nature of the procedure as well as the potential risks, complications, limitations, and alternatives to the procedure were discussed at length with the patient and the patient has agreed  to proceed with surgery.      TECHNICAL NOTE:   Dictation on: 07/21/2025  2:41 PM by: CRYSTAL DANIELSON [[id number]]           Crystal Danielson M.D.      Electronically signed by Crystal Danielson MD at 07/21/25 1441          Physician Progress Notes (all)        Crystal Danielson MD at 07/23/25 0618          NEUROSURGERY PROGRESS NOTE     LOS: 2 days   Patient Care Team:  Connie May APRN as PCP - General (Nurse Practitioner)    Chief Complaint: Upper extremity sensory alteration and weakness with gait dysfunction.    POD#: 2 Days Post-Op  Procedures:  C2-7 laminectomies with C2-T2 fusion and stabilization.    Interval History:   Patient ambulated yesterday.  Patient Complaints: Incisional pain.  Patient Denies: Voiding difficulty.  Sensation is a bit improved in her right hand compared to the preop period.    Vital Signs: Blood pressure 168/79, pulse 84, temperature 98.1 °F (36.7 °C), temperature source Oral, resp. rate 16, last menstrual period 07/14/2025, SpO2 93%, not currently breastfeeding.  Intake/Output:   Intake/Output Summary (Last 24 hours) at 7/23/2025 0619  Last data filed at 7/23/2025 0518  Gross per 24 hour   Intake 720 ml   Output 1580 ml   Net -860 ml     Drain output: 120/60 mL.    Physical Exam:  The patient is sitting up in bed.  She appears a bit uncomfortable at the moment.  Dry dressing is in place on her incision.   strength is mildly weak bilaterally, unchanged.     Assessment/Plan:  1.  Cervical spondylosis and stenosis with myelopathy status post extensive cervical decompression with fusion and stabilization C2-T2.  2.  Iron deficiency anemia.  3.  Bipolar disorder.  4.  Hypertension.  5.  Schizoaffective disorder.  6.  Tobacco abuse.  7.  Disposition: Mobilize patient.  Discontinue drain later today.  Hopefully home tomorrow.      Crystal Danielson MD  07/23/25  06:19 EDT      Electronically signed by Crystal Danielson MD at 07/23/25 0624       Crystal Danielson MD at 07/22/25  0547          NEUROSURGERY PROGRESS NOTE     LOS: 1 day   Patient Care Team:  Connie May APRN as PCP - General (Nurse Practitioner)    Chief Complaint: Upper extremity sensory alteration and weakness with gait dysfunction.    POD#: 1 Day Post-Op  Procedures:  C2-7 laminectomies with C2-T2 fusion and stabilization.    Interval History: Patient ambulated short distance last night.  Patient Complaints: Incisional pain.  Patient Denies: New numbness or weakness.    Vital Signs: Blood pressure 169/90, pulse 76, temperature 98 °F (36.7 °C), temperature source Oral, resp. rate 18, last menstrual period 07/14/2025, SpO2 98%, not currently breastfeeding.  Intake/Output:   Intake/Output Summary (Last 24 hours) at 7/22/2025 0547  Last data filed at 7/22/2025 0539  Gross per 24 hour   Intake 3730 ml   Output 5235 ml   Net -1505 ml     Drain output: 50/165 mL.    Physical Exam:  Patient is sitting up in bed and watching television.  She is in good spirits.  Dry dressing is in place on her incision.  She has some baseline mild decrease in her hand  strength.  Lower extremity strength is intact.     Assessment/Plan:  1.  Cervical spondylosis and stenosis with myelopathy status post extensive cervical decompression with fusion and stabilization C2-T2.  2.  Iron deficiency anemia.  3.  Bipolar disorder.  4.  Hypertension.  5.  Schizoaffective disorder.  6.  Tobacco abuse.  7.  Disposition: Mobilize patient.      Houston Danielson MD  07/22/25  05:47 EDT          Electronically signed by Houston Danielson MD at 07/22/25 0507

## 2025-07-23 NOTE — PLAN OF CARE
Goal Outcome Evaluation:  Plan of Care Reviewed With: patient        Progress: improving  Outcome Evaluation: VSS on RA. AOx4, drowsy at times. Pain consistently rated at 7-8/10. PRN Norco X2, PRN Morphine X1. Ambulated with PT/OT, up to chair. Ambulating with X1 assistance, walker, and gait belt to the bathroom with adequate UOP. Tolerating diet, no bowel movement since 7/20. PRN Dulcolax tablet X1. Drain removed, X1 suture removed from drain placement. Posterior neck dressing changed from border dressing to sterile hydrocolloid. Okay to shower later today per order. Turn Q2H. Scuds in place. No family at the bedside. Plan is home tomorrow.

## 2025-07-23 NOTE — PLAN OF CARE
Problem: Adult Inpatient Plan of Care  Goal: Plan of Care Review  Recent Flowsheet Documentation  Taken 7/23/2025 1320 by Maryan Betancourt OT  Progress: no change  Outcome Evaluation: Pt participates in therapy with good effort. Remains limited by acute pain despite being pre-medicated. Up in room and hallway with RW support and CGAx1 x75'. No dizziness or LOB. Teaching reviewed for spinal precautions and ADL retraining. Follow up teaching and trial recommended. Pt completes BUE/hand HEPs to support self-care. OT will continue to follow IP. Recommend home with 24/7 assist when pt is medically ready. Pt reports that she can arrange to have assistance with her ADLs if needed at d/c.

## 2025-07-23 NOTE — PLAN OF CARE
Goal Outcome Evaluation:  Plan of Care Reviewed With: patient        Progress: improving  Outcome Evaluation: Patient demonstrated improving control of walker. Her pain remains high, making getting out of bed difficult    Anticipated Discharge Disposition (PT): home with assist

## 2025-07-24 VITALS
TEMPERATURE: 98.2 F | SYSTOLIC BLOOD PRESSURE: 151 MMHG | RESPIRATION RATE: 18 BRPM | DIASTOLIC BLOOD PRESSURE: 84 MMHG | HEART RATE: 88 BPM | OXYGEN SATURATION: 92 %

## 2025-07-24 PROCEDURE — 99024 POSTOP FOLLOW-UP VISIT: CPT | Performed by: NEUROLOGICAL SURGERY

## 2025-07-24 PROCEDURE — 97116 GAIT TRAINING THERAPY: CPT

## 2025-07-24 RX ORDER — OXYCODONE AND ACETAMINOPHEN 5; 325 MG/1; MG/1
1 TABLET ORAL EVERY 6 HOURS PRN
Qty: 20 TABLET | Refills: 0 | Status: SHIPPED | OUTPATIENT
Start: 2025-07-24 | End: 2025-07-29 | Stop reason: SDUPTHER

## 2025-07-24 RX ADMIN — OXYCODONE HYDROCHLORIDE AND ACETAMINOPHEN 1 TABLET: 7.5; 325 TABLET ORAL at 02:55

## 2025-07-24 RX ADMIN — OXYCODONE HYDROCHLORIDE AND ACETAMINOPHEN 1 TABLET: 7.5; 325 TABLET ORAL at 06:55

## 2025-07-24 RX ADMIN — FERROUS SULFATE TAB 325 MG (65 MG ELEMENTAL FE) 325 MG: 325 (65 FE) TAB at 08:08

## 2025-07-24 RX ADMIN — Medication 3 ML: at 08:10

## 2025-07-24 RX ADMIN — ATENOLOL 25 MG: 25 TABLET ORAL at 08:08

## 2025-07-24 RX ADMIN — FAMOTIDINE 20 MG: 20 TABLET, FILM COATED ORAL at 08:08

## 2025-07-24 RX ADMIN — OXYCODONE HYDROCHLORIDE AND ACETAMINOPHEN 1 TABLET: 7.5; 325 TABLET ORAL at 10:57

## 2025-07-24 NOTE — PROGRESS NOTES
NEUROSURGERY PROGRESS NOTE     LOS: 3 days   Patient Care Team:  Connie May APRN as PCP - General (Nurse Practitioner)    Chief Complaint: Upper extremity sensory alteration and weakness with gait dysfunction.    POD#: 3 Days Post-Op  Procedures:  C2-7 laminectomies with C2-T2 fusion and stabilization.    Interval History:   Patient Complaints: Incisional pain but overall improved from a couple days ago.  Patient Denies: New weakness or numbness.    Vital Signs: Blood pressure 149/83, pulse 89, temperature 98.7 °F (37.1 °C), temperature source Oral, resp. rate 18, last menstrual period 07/14/2025, SpO2 95%, not currently breastfeeding.  Intake/Output:   Intake/Output Summary (Last 24 hours) at 7/24/2025 0622  Last data filed at 7/24/2025 0314  Gross per 24 hour   Intake 950 ml   Output 3120 ml   Net -2170 ml     Drain output: 55 mL.    Physical Exam:  Patient is sitting up in bed and is in good spirits.  She moves about uncomfortably but independently.  Dry dressing is in place on her incision.    Assessment/Plan:  1.  Cervical spondylosis and stenosis with myelopathy status post extensive cervical decompression with fusion and stabilization C2-T2.  2.  Iron deficiency anemia.  3.  Bipolar disorder.  4.  Hypertension.  5.  Schizoaffective disorder.  6.  Tobacco abuse.  7.  Disposition: Mobilize patient.  Home later today.  Apparently her family has some misgivings about her coming home but medically she is ready for that.  She will follow-up with [facility] in my office in approximately 10 days for wound check and suture removal.      Houston Danielson MD  07/24/25  06:22 EDT

## 2025-07-24 NOTE — THERAPY DISCHARGE NOTE
Patient Name: Jazzy Valle  : 1975    MRN: [id number]                              Today's Date: 2025       Admit Date: 2025    Visit Dx:     ICD-10-CM ICD-9-CM   1. Cervical spondylolysis  M43.02 756.19   2. Cervical spondylosis with myelopathy  M47.12 721.1     Patient Active Problem List   Diagnosis    Essential hypertension    Obesity (BMI 30-39.9)    Tobacco abuse    Chest pressure    Psychosis    Acute psychosis    Chronic residual schizophrenia with acute exacerbations    Methamphetamine use disorder, severe, dependence    Anemia, iron deficiency    Iron malabsorption    Cervical spondylosis with myelopathy    Cervical spondylolysis     Past Medical History:   Diagnosis Date    Anxiety     Bipolar disorder     Depression     Hernia of abdominal wall     Hypertension     PCO (polycystic ovaries)     Psychosis     Schizoaffective disorder     Substance abuse      Past Surgical History:   Procedure Laterality Date    CERVICAL DISCECTOMY POSTERIOR FUSION WITH BRAIN LAB N/A 2025    Procedure: CERVICAL DISCECTOMY LAMINECTOMY C2-C7 DECOMPRESSION POSTERIOR FUSION C2-T2  WITH INSTRUMENTATION;  Surgeon: Houston Danielson MD;  Location: [facility];  Service: Neurosurgery;  Laterality: N/A;    CERVICAL SPINE SURGERY  2009    ACDF C4-5- Dr. Mark Anthony Escobar    COLONOSCOPY      KNEE SURGERY Right 2014    scope      General Information       Row Name 25 1136          Physical Therapy Time and Intention    Document Type therapy note (daily note)  -SC     Mode of Treatment physical therapy  -SC       Row Name 25 1136          General Information    Patient Profile Reviewed yes  -SC     Existing Precautions/Restrictions spinal  -SC       Row Name 25 1136          Cognition    Orientation Status (Cognition) oriented x 4  -SC       Row Name 25 1136          Safety Issues/Impairments Affecting Functional Mobility    Impairments Affecting Function (Mobility)  balance;endurance/activity tolerance;pain;strength;range of motion (ROM);sensation/sensory awareness  -SC     Comment, Safety Issues/Impairments (Mobility) alert, folloqing commands  -SC               User Key  (r) = Recorded By, (t) = Taken By, (c) = Cosigned By      Initials Name Provider Type    SC Robyn Michaud PT Physical Therapist                   Mobility       Row Name 07/24/25 1138          Bed Mobility    Bed Mobility supine-sit;scooting/bridging  -SC     Scooting/Bridging [place] (Bed Mobility) independent  -SC     Supine-Sit [place] (Bed Mobility) verbal cues;minimum assist (75% patient effort)  -SC     Comment, (Bed Mobility) worked on log rolling from flat bed. Required some assist to right trunk  -SC       Row Name 07/24/25 1138          Sit-Stand Transfer    Sit-Stand [place] (Transfers) modified independence;verbal cues  -SC     Assistive Device (Sit-Stand Transfers) walker, front-wheeled  -SC     Comment, (Sit-Stand Transfer) good technique  -SC       Row Name 07/24/25 1138          Gait/Stairs (Locomotion)    [place]ry Level (Gait) 1 person assist;contact guard;verbal cues  -SC     Assistive Device (Gait) walker, front-wheeled  -SC     Distance in Feet (Gait) 230  -SC     Deviations/Abnormal Patterns (Gait) base of support, wide;gait speed decreased;weight shifting decreased;stride length decreased  -SC     Bilateral Gait Deviations forward flexed posture  -SC     Comment, (Gait/Stairs) Gt training focused on controling walker in hallway with step throught gait pattern. Encouraged upright gaze. Demonstrated good control on turns and negotiating objects. No LOB  -SC               User Key  (r) = Recorded By, (t) = Taken By, (c) = Cosigned By      Initials Name Provider Type    SC Robyn Michaud PT Physical Therapist                   Obj/Interventions       Row Name 07/24/25 1140          Motor Skills    Therapeutic Exercise shoulder  shoulder chicken wings and rowings.  Gentle head nodds - ROM very limited  -SC       Row Name 07/24/25 1140          Balance    Dynamic Standing Balance contact guard  -SC     Position/Device Used, Standing Balance supported;walker, rolling  -SC     Comment, Balance no LOB  -SC               User Key  (r) = Recorded By, (t) = Taken By, (c) = Cosigned By      Initials Name Provider Type    SC Robyn Michaud, PT Physical Therapist                   Goals/Plan       Row Name 07/24/25 1143          Bed Mobility Goal 1 (PT)    Activity/Assistive Device (Bed Mobility Goal 1, PT) sit to supine/supine to sit  -SC     Time Frame (Bed Mobility Goal 1, PT) long term goal (LTG);3 days  -SC     Progress/Outcomes (Bed Mobility Goal 1, PT) goal met  -SC       Row Name 07/24/25 1143          Transfer Goal 1 (PT)    Activity/Assistive Device (Transfer Goal 1, PT) sit-to-stand/stand-to-sit;walker, rolling  -SC     [place] Level/Cues Needed (Transfer Goal 1, PT) modified independence  -SC     Time Frame (Transfer Goal 1, PT) long term goal (LTG);3 days  -SC     Progress/Outcome (Transfer Goal 1, PT) goal met  -SC       Row Name 07/24/25 1143          Gait Training Goal 1 (PT)    Activity/Assistive Device (Gait Training Goal 1, PT) gait (walking locomotion);walker, rolling  -SC     [place] Level (Gait Training Goal 1, PT) modified independence  -SC     Time Frame (Gait Training Goal 1, PT) long term goal (LTG);3 days  -SC     Progress/Outcome (Gait Training Goal 1, PT) goal met  -SC       Row Name 07/24/25 1143          Stairs Goal 1 (PT)    Activity/Assistive Device (Stairs Goal 1, PT) stairs, all skills  -SC     [place] Level/Cues Needed (Stairs Goal 1, PT) contact guard required  -SC     Number of Stairs (Stairs Goal 1, PT) 4  -SC     Time Frame (Stairs Goal 1, PT) long term goal (LTG);10 days  -SC     Progress/Outcome (Stairs Goal 1, PT) discharged from facility  -SC               User Key  (r) = Recorded By, (t) = Taken By, (c) = Cosigned By       Initials Name Provider Type    SC Robyn Michaud, PT Physical Therapist                   Clinical Impression       Row Name 07/24/25 1142          Pain    Pain Location neck  -SC     Pain Management Interventions positioning techniques utilized  -SC     Response to Pain Interventions activity participation with tolerable pain  -SC     Additional Documentation Pain Scale: FACES Pre/Post-Treatment (Group)  -SC       Row Name 07/24/25 1142          Pain Scale: FACES Pre/Post-Treatment    Pain: FACES Scale, Pretreatment 2-->hurts little bit  -SC     Posttreatment Pain Rating 2-->hurts little bit  -SC       Row Name 07/24/25 1142          Plan of Care Review    Plan of Care Reviewed With patient  -SC     Progress improving  -SC     Outcome Evaluation Patient showing improved mobility getting oob and ambulating with walker. She is going home with her father's help.  -SC       Row Name 07/24/25 1142          Therapy Assessment/Plan (PT)    Rehab Potential (PT) good  -SC     Criteria for Skilled Interventions Met (PT) yes;meets criteria  -SC     Therapy Frequency (PT) daily  -SC       Row Name 07/24/25 1142          Positioning and Restraints    Pre-Treatment Position in bed  -SC     Post Treatment Position chair  -SC     In Chair notified nsg;reclined;sitting;call light within reach;encouraged to call for assist;exit alarm on  -SC               User Key  (r) = Recorded By, (t) = Taken By, (c) = Cosigned By      Initials Name Provider Type    SC Robyn Michaud, PT Physical Therapist                   Outcome Measures       Row Name 07/24/25 1144 07/24/25 0808       How much help from another person do you currently need...    Turning from your back to your side while in flat bed without using bedrails? 4  -SC 3  -ASMITA    Moving from lying on back to sitting on the side of a flat bed without bedrails? 3  -SC 3  -ASMITA    Moving to and from a bed to a chair (including a wheelchair)? 3  -SC 3  -ASMITA    Standing up from a chair  using your arms (e.g., wheelchair, bedside chair)? 3  -SC 3  -ASMITA    Climbing 3-5 steps with a railing? 3  -SC 3  -ASMITA    To walk in hospital room? 3  -SC 3  -ASMITA    AM-PAC 6 Clicks Score (PT) 19  -SC 18  -ASMITA    Highest Level of Mobility Goal Walk 10 Steps or More-6  -SC Walk 10 Steps or More-6  -ASMITA      Row Name 07/24/25 1144          Functional Assessment    Outcome Measure Options AM-PAC 6 Clicks Basic Mobility (PT)  -SC               User Key  (r) = Recorded By, (t) = Taken By, (c) = Cosigned By      Initials Name Provider Type    SC Robyn Michaud, PT Physical Therapist    Kushal Hand, RN Registered Nurse                  Physical Therapy Education       Title: PT OT SLP Therapies (In Progress)       Topic: Physical Therapy (Done)       Point: Mobility training (Done)       Learning Progress Summary            Patient Eager, E, VU,DU by SC at 7/24/2025 1144    Comment: reviewed HEP    Eager, E, VU by SC at 7/23/2025 1013    Comment: reviewed HEP    Eager, E, VU by SC at 7/22/2025 0917    Comment: reviewed precautions                      Point: Home exercise program (Done)       Learning Progress Summary            Patient Eager, E, VU,DU by SC at 7/24/2025 1144    Comment: reviewed HEP    Eager, E, VU by SC at 7/23/2025 1013    Comment: reviewed HEP    Eager, E, VU by SC at 7/22/2025 0917    Comment: reviewed precautions                      Point: Body mechanics (Done)       Learning Progress Summary            Patient Eager, E, VU,DU by SC at 7/24/2025 1144    Comment: reviewed HEP    Eager, E, VU by SC at 7/23/2025 1013    Comment: reviewed HEP    Eager, E, VU by SC at 7/22/2025 0917    Comment: reviewed precautions                      Point: Precautions (Done)       Learning Progress Summary            Patient Eager, E, VU,DU by SC at 7/24/2025 1144    Comment: reviewed HEP    Eager, E, VU by SC at 7/23/2025 1013    Comment: reviewed HEP    Eager, E, VU by SC at 7/22/2025 0917    Comment: reviewed  precautions                                      User Key       Initials Effective Dates Name Provider Type Discipline    SC 02/03/23 -  Robyn Michaud PT Physical Therapist PT                  PT Recommendation and Plan  Planned Therapy Interventions (PT): balance training, bed mobility training, gait training, home exercise program, ROM (range of motion), patient/family education, stair training, strengthening, stretching, transfer training  Progress: improving  Outcome Evaluation: Patient showing improved mobility getting oob and ambulating with walker. She is going home with her father's help.     Time Calculation:   PT Evaluation Complexity  History, PT Evaluation Complexity: 3 or more personal factors and/or comorbidities  Examination of Body Systems (PT Eval Complexity): total of 4 or more elements  Clinical Presentation (PT Evaluation Complexity): evolving  Clinical Decision Making (PT Evaluation Complexity): moderate complexity  Overall Complexity (PT Evaluation Complexity): moderate complexity     PT Charges       Row Name 07/24/25 1115             Time Calculation    Start Time 1115  -SC      PT Received On 07/24/25  -SC         Timed Charges    15254 - PT Therapeutic Exercise Minutes 7  -SC      89377 - Gait Training Minutes  8  -SC      90346 - PT Therapeutic Activity Minutes 2  -SC         Total Minutes    Timed Charges Total Minutes 17  -SC       Total Minutes 17  -SC                User Key  (r) = Recorded By, (t) = Taken By, (c) = Cosigned By      Initials Name Provider Type    SC Robyn Michaud, PT Physical Therapist                  Therapy Charges for Today       Code Description Service Date Service Provider Modifiers Qty    18064984102 HC GAIT TRAINING EA 15 MIN 7/23/2025 Robyn Michaud, PT GP 1    60121130015 HC GAIT TRAINING EA 15 MIN 7/24/2025 Robyn Michaud, PT GP 1            PT G-Codes  Outcome Measure Options: AM-PAC 6 Clicks Basic Mobility (PT)  AM-PAC 6 Clicks Score (PT):  19  AM-PAC 6 Clicks Score (OT): 15    PT Discharge Summary  Anticipated Discharge Disposition (PT): home with assist    Robyn Michaud, PT  7/24/2025

## 2025-07-24 NOTE — CASE MANAGEMENT/SOCIAL WORK
Continued Stay Note  [facility]     Patient Name: Jazzy Valle  MRN: [id number]  Today's Date: 7/24/2025    Admit Date: 7/21/2025    Plan: home   Discharge Plan       Row Name 07/24/25 1051       Plan    Plan Comments Rolling walker provided by [employer]. Pt reports she has family who can assist if needed at home. No other dc needs at this time                   Discharge Codes    No documentation.                 Expected Discharge Date and Time       Expected Discharge Date Expected Discharge Time    Jul 24, 2025               Cecilia Ramirez RN

## 2025-07-24 NOTE — PLAN OF CARE
Problem: Adult Inpatient Plan of Care  Goal: Absence of Hospital-Acquired Illness or Injury  Intervention: Identify and Manage Fall Risk  Recent Flowsheet Documentation  Taken 7/24/2025 0400 by Fauzia Higuera RN  Safety Promotion/Fall Prevention:   toileting scheduled   safety round/check completed   room organization consistent   nonskid shoes/slippers when out of bed   lighting adjusted   gait belt   fall prevention program maintained   assistive device/personal items within reach   activity supervised  Taken 7/24/2025 0314 by Fauzia Higuera RN  Safety Promotion/Fall Prevention:   toileting scheduled   safety round/check completed   room organization consistent   nonskid shoes/slippers when out of bed   gait belt   fall prevention program maintained   activity supervised   assistive device/personal items within reach  Taken 7/24/2025 0200 by Fauzia Higuera RN  Safety Promotion/Fall Prevention:   toileting scheduled   safety round/check completed   room organization consistent   nonskid shoes/slippers when out of bed   lighting adjusted   fall prevention program maintained   assistive device/personal items within reach   activity supervised  Taken 7/24/2025 0000 by Fauzia Higuera RN  Safety Promotion/Fall Prevention:   toileting scheduled   safety round/check completed   room organization consistent   patient off unit   nonskid shoes/slippers when out of bed   fall prevention program maintained  Taken 7/23/2025 2200 by Fauzia Higuera RN  Safety Promotion/Fall Prevention:   toileting scheduled   safety round/check completed   room organization consistent   nonskid shoes/slippers when out of bed   lighting adjusted   fall prevention program maintained   activity supervised  Taken 7/23/2025 2031 by Fauzia Higuera RN  Safety Promotion/Fall Prevention:   activity supervised   assistive device/personal items within reach   fall prevention program maintained   lighting adjusted   nonskid shoes/slippers when out of bed    room organization consistent   safety round/check completed  Intervention: Prevent Skin Injury  Recent Flowsheet Documentation  Taken 7/24/2025 0200 by Fauzia Higuera RN  Body Position: supine  Taken 7/24/2025 0000 by Fuazia Higuera RN  Body Position: position maintained  Skin Protection: incontinence pads utilized  Taken 7/23/2025 2200 by Fauzia Higuera RN  Body Position: position maintained  Taken 7/23/2025 2031 by Fauzia Higuera RN  Body Position: sitting up in bed  Skin Protection:   incontinence pads utilized   transparent dressing maintained  Intervention: Prevent and Manage VTE (Venous Thromboembolism) Risk  Recent Flowsheet Documentation  Taken 7/23/2025 2031 by Fauzia Higuera RN  VTE Prevention/Management: SCDs (sequential compression devices) on  Intervention: Prevent Infection  Recent Flowsheet Documentation  Taken 7/24/2025 0400 by Fauzia Higuera RN  Infection Prevention:   environmental surveillance performed   hand hygiene promoted   rest/sleep promoted   single patient room provided  Taken 7/24/2025 0200 by Fauzia Higuera RN  Infection Prevention:   single patient room provided   rest/sleep promoted   hand hygiene promoted  Taken 7/24/2025 0000 by Fauzia Higuera RN  Infection Prevention:   equipment surfaces disinfected   personal protective equipment utilized   rest/sleep promoted   single patient room provided  Taken 7/23/2025 2200 by Fauzia Higuera RN  Infection Prevention:   equipment surfaces disinfected   hand hygiene promoted   rest/sleep promoted   single patient room provided  Taken 7/23/2025 2031 by Fauzia Higuera RN  Infection Prevention:   equipment surfaces disinfected   hand hygiene promoted   rest/sleep promoted   single patient room provided  Goal: Optimal Comfort and Wellbeing  Intervention: Monitor Pain and Promote Comfort  Recent Flowsheet Documentation  Taken 7/23/2025 2200 by Fauzia Higuera RN  Pain Management Interventions:   quiet environment facilitated   relaxation techniques  promoted   pillow support provided  Taken 7/23/2025 2100 by Fauzia Higuera RN  Pain Management Interventions: pillow support provided  Taken 7/23/2025 2031 by Fauzia Higuera RN  Pain Management Interventions:   pain medication given   cold applied  Intervention: Provide Person-Centered Care  Recent Flowsheet Documentation  Taken 7/23/2025 2200 by Fauzia Higuera RN  Trust Relationship/Rapport:   care explained   choices provided  Taken 7/23/2025 2031 by Fauzia Higuera RN  Trust Relationship/Rapport:   care explained   choices provided   Goal Outcome Evaluation: Patient is alert and oriented x4. Prn pain medications given. Patient up with assist x1 with walker. Dressing to the back of the neck is clean, dry, and intact. Plans to discharge home today. Patient is resting, call light in reach

## 2025-07-24 NOTE — PLAN OF CARE
Problem: Adult Inpatient Plan of Care  Goal: Plan of Care Review  Recent Flowsheet Documentation  Taken 7/24/2025 1142 by Robyn Michaud, PT  Progress: improving  Outcome Evaluation: Patient showing improved mobility getting oob and ambulating with walker. She is going home with her father's help.  Plan of Care Reviewed With: patient   Goal Outcome Evaluation:  Plan of Care Reviewed With: patient        Progress: improving  Outcome Evaluation: Patient showing improved mobility getting oob and ambulating with walker. She is going home with her father's help.    Anticipated Discharge Disposition (PT): home with assist

## 2025-07-24 NOTE — PAYOR COMM NOTE
"Alisa Suarez RN  [facility]  Utilization Management  P:[phone]  F:[phone]    Auth # [id number]   7/24: DC date  No dc summary available    Jazzy Baca (49 y.o. Female)       Date of Birth   1975    Social Security Number       Address   [address] [place] [address]    Home Phone   [phone]    MRN   [id number]       Alevism   Mu-ism    Marital Status   Single                            Admission Date   7/21/2025    Admission Type   Elective    Admitting Provider   Houston Danielson MD    Attending Provider       Department, Room/Bed   [facility] 3H, S379/1       Discharge Date   7/24/2025    Discharge Disposition   Home or Self Care    Discharge Destination                                 Attending Provider: (none)   Allergies: Abilify [Aripiprazole], Cymbalta [Duloxetine Hcl]    Isolation: None   Infection: None   Code Status: CPR    Ht: 160 cm (63\")   Wt: 96.2 kg (212 lb)    Admission Cmt: None   Principal Problem: Cervical spondylosis with myelopathy [M47.12]                   Active Insurance as of 7/21/2025       Primary Coverage       Payor Plan Insurance Group Employer/Plan Group    AETNA MEDICARE REPLACEMENT AETNA MEDICARE ADVANTAGE [place] [id number]       Payor Plan Address Payor Plan Phone Number Payor Plan Fax Number Effective Dates    [address] [phone]  1/1/2025 - None Entered    [facility] [id number]         Subscriber Name Subscriber Birth Date Member ID       JAZZY BACA 1975 [id number]                     Emergency Contacts        (Rel.) Home Phone Work Phone Mobile Phone    Davie Baca (Father) -- -- [phone]    IVELISSE BARKER (Relative) [phone] -- --              Houston Danielson MD   Physician  Neurosurgery     Progress Notes      Signed     Date of Service: 07/24/25 0622  Creation Time: 07/24/25 0622     Signed         NEUROSURGERY PROGRESS NOTE      LOS: 3 days   Patient Care " Team:  Connie May APRN as PCP - General (Nurse Practitioner)     Chief Complaint: Upper extremity sensory alteration and weakness with gait dysfunction.     POD#: 3 Days Post-Op  Procedures:  C2-7 laminectomies with C2-T2 fusion and stabilization.     Interval History:   Patient Complaints: Incisional pain but overall improved from a couple days ago.  Patient Denies: New weakness or numbness.     Vital Signs: Blood pressure 149/83, pulse 89, temperature 98.7 °F (37.1 °C), temperature source Oral, resp. rate 18, last menstrual period 07/14/2025, SpO2 95%, not currently breastfeeding.  Intake/Output:   Intake/Output Summary (Last 24 hours) at 7/24/2025 0622  Last data filed at 7/24/2025 0314      Gross per 24 hour   Intake 950 ml   Output 3120 ml   Net -2170 ml      Drain output: 55 mL.     Physical Exam:  Patient is sitting up in bed and is in good spirits.  She moves about uncomfortably but independently.  Dry dressing is in place on her incision.     Assessment/Plan:  1.  Cervical spondylosis and stenosis with myelopathy status post extensive cervical decompression with fusion and stabilization C2-T2.  2.  Iron deficiency anemia.  3.  Bipolar disorder.  4.  Hypertension.  5.  Schizoaffective disorder.  6.  Tobacco abuse.  7.  Disposition: Mobilize patient.  Home later today.  Apparently her family has some misgivings about her coming home but medically she is ready for that.  She will follow-up with CHAST[facility] in my office in approximately 10 days for wound check and suture removal.        Houston Danielson MD  07/24/25  06:22 EDT                        Discharge Summary    No notes of this type exist for this encounter.

## 2025-07-25 ENCOUNTER — READMISSION MANAGEMENT (OUTPATIENT)
Dept: CALL CENTER | Facility: HOSPITAL | Age: 50
End: 2025-07-25
Payer: MEDICARE

## 2025-07-25 NOTE — OUTREACH NOTE
Prep Survey      Flowsheet Row Responses   [facility] facility patient discharged from? [facility]   Is LACE score < 7 ? No   Eligibility Readm Mgmt   Discharge diagnosis CERVICAL DISCECTOMY LAMINECTOMY C2-C7 DECOMPRESSION POSTERIOR FUSION C2 - T2  WITH INSTRUMENTATION   Does the patient have one of the following disease processes/diagnoses(primary or secondary)? General Surgery   Does the patient have Home health ordered? No   Is there a DME ordered? Yes   What DME was ordered? Rolling walker provided by [employer].   Prep survey completed? Yes            DORIS CHAVIRA - Registered Nurse

## 2025-07-28 NOTE — DISCHARGE SUMMARY
[facility]    Date of Admission: 7/21/2025  Date of Discharge:  7/28/2025    Discharge Diagnosis: Cervical spondylosis and stenosis with myelopathy    Procedures Performed  Procedure(s):  CERVICAL DISCECTOMY LAMINECTOMY C2-C7 DECOMPRESSION POSTERIOR FUSION C2-T2  WITH INSTRUMENTATION       History of Present Illness:  Ms. Valle is a 49 y.o. female that remotely underwent anterior cervical fusion by Dr. Escobar in 2008.  She suffered a fall in approximately June 2024.  She developed progressive neck pain with numbness and weakness in her hands and arms.  She also suffered from an altered gait.  Studies demonstrated diffuse cervical spondylosis and stenosis with cord compromise at multiple levels and was found to be frankly myelopathic on examination.  She subsequently underwent multilevel decompression with fusion and stabilization from C2-T2 by Dr. Danielson on 7/21/2025.  Surgery was without overt complications.  Her surgical incision was closed with a running locked nylon suture.  A STACI drain and  Tapia catheter were placed.    Hospital Course:   The patient was admitted to the floor for observation.  She was able to ambulate the hallway with some assistance and take oral medications by mouth for pain control.  She participated with physical therapy/occupational therapy on POD #1.  Her Tapia catheter was also discontinued same day and was able to void independently.  Her STACI drain was discontinued on POD #2.  She ultimately discharged home on POD #3 with a rolling walker.    Condition on Discharge:  Stable  Discharge to: Home    PATIENT SPECIFIC EDUCATION/PLAN:  1. Follow-up with neurosurgery PAZAFAR in approximately 10 days for wound check and suture removal.  2. No driving until follow-up.  3.  The patient may get her Aquacel dressing wet in the shower.  Patient may remove the Aquacel dressing and get the surgical incision itself wet beginning on 7/27/2025.  4. NO  tub bathing or swimming until follow-up  5. Ice pack to incision(s) as needed for associated pain or swelling.  6.  The patient is to ambulate frequently.  No sustained work or activity at the shoulder level or above until follow-up.  7.  Head of bed at 30 degrees or higher x 5 days.    Discharge Medications     Discharge Medications        New Medications        Instructions Start Date   oxyCODONE-acetaminophen 5-325 MG per tablet  Commonly known as: PERCOCET   Take 1 tablet by mouth Every 6 (Six) Hours As Needed for Pain.             Continue These Medications        Instructions Start Date   atenolol 50 MG tablet  Commonly known as: TENORMIN   50 mg, Oral, Daily      escitalopram 5 MG tablet  Commonly known as: Lexapro   5 mg, Oral, Daily      ferrous sulfate 325 (65 FE) MG tablet   325 mg, Oral, Daily With Breakfast      OLANZapine 5 MG tablet  Commonly known as: ZyPREXA   5 mg, Oral, Nightly               Follow-up Appointments  Future Appointments   Date Time Provider Department Center   8/1/2025  2:30 PM [facility]   8/5/2025  1:00 PM Cindi Iverson PA-C [facility]   9/10/2025 10:30 AM Lidia Michaud APRN [facility]     Additional Instructions for the Follow-ups that You Need to Schedule       Discharge Follow-up with Specified Provider: Jagjit; 2 Weeks   As directed      To: Jagjit   Follow Up: 2 Weeks   Follow Up Details: Follow-up with physician's assistant in approximately 10 days for wound check and suture removal                Referring Provider  MD ANGELA Menard Teresa Susan, APRN Laryssa Helene Cybriwsky, PA-C  07/28/25  08:43 EDT

## 2025-07-29 DIAGNOSIS — M47.12 CERVICAL SPONDYLOSIS WITH MYELOPATHY: ICD-10-CM

## 2025-07-29 NOTE — TELEPHONE ENCOUNTER
Provider:  Jagjit  Surgery/Procedure:  Cervical discectomy laminectomy L2-7 decompression posterior fusion C2-T2 with instrumentation  Surgery/Procedure Date:  7/21/25  Last visit:   6/20/25  Next visit: 8/5/25     Reason for call:  Patient has 4 tablets remaining of Percocet 5-325 mg. She is requesting a refill as she is still having a good deal of post op pain.     Richard:    1 07/24/2025 [id number] Oxycodone/Acetaminophen   325MG/5MG  Houston Danielson [facility]   RETAIL PHARMACY  20.00 5 30 03 [place]  New 07/24/2025 [facility]  2 01/21/2025 [id number] Gabapentin 100MG [facility] 28.00 14 04 [place]  New 01/21/2025 [facility] [place]

## 2025-07-30 RX ORDER — OXYCODONE AND ACETAMINOPHEN 5; 325 MG/1; MG/1
1 TABLET ORAL EVERY 8 HOURS PRN
Qty: 20 TABLET | Refills: 0 | Status: SHIPPED | OUTPATIENT
Start: 2025-07-30

## 2025-08-01 NOTE — PROGRESS NOTES
Enter Query Response Below      Query Response: MagnetOs strips and local autograft were utilized as an onlay fusion along the lateral masses and residual lamina from C2-T2             If applicable, please update the problem list.

## 2025-08-05 ENCOUNTER — OFFICE VISIT (OUTPATIENT)
Dept: NEUROSURGERY | Facility: CLINIC | Age: 50
End: 2025-08-05
Payer: MEDICARE

## 2025-08-05 VITALS — BODY MASS INDEX: 37.76 KG/M2 | TEMPERATURE: 97.7 F | HEIGHT: 63 IN | WEIGHT: 213.1 LBS

## 2025-08-05 DIAGNOSIS — M43.02 CERVICAL SPONDYLOLYSIS: Primary | ICD-10-CM

## 2025-08-05 DIAGNOSIS — M47.12 CERVICAL SPONDYLOSIS WITH MYELOPATHY: ICD-10-CM

## 2025-08-05 DIAGNOSIS — M47.12 CERVICAL SPONDYLOSIS WITH MYELOPATHY: Primary | ICD-10-CM

## 2025-08-05 PROCEDURE — 99024 POSTOP FOLLOW-UP VISIT: CPT | Performed by: PHYSICIAN ASSISTANT

## 2025-08-05 RX ORDER — METHOCARBAMOL 500 MG/1
500 TABLET, FILM COATED ORAL 3 TIMES DAILY
Qty: 30 TABLET | Refills: 0 | Status: SHIPPED | OUTPATIENT
Start: 2025-08-05

## 2025-08-05 RX ORDER — SENNOSIDES 8.6 MG
650 CAPSULE ORAL EVERY 8 HOURS PRN
COMMUNITY

## 2025-08-05 RX ORDER — OXYCODONE AND ACETAMINOPHEN 5; 325 MG/1; MG/1
1 TABLET ORAL DAILY PRN
Qty: 15 TABLET | Refills: 0 | Status: SHIPPED | OUTPATIENT
Start: 2025-08-05

## 2025-08-06 ENCOUNTER — READMISSION MANAGEMENT (OUTPATIENT)
Dept: CALL CENTER | Facility: HOSPITAL | Age: 50
End: 2025-08-06
Payer: MEDICARE

## 2025-08-18 ENCOUNTER — TELEPHONE (OUTPATIENT)
Dept: NEUROSURGERY | Facility: CLINIC | Age: 50
End: 2025-08-18
Payer: MEDICARE

## 2025-08-18 ENCOUNTER — READMISSION MANAGEMENT (OUTPATIENT)
Dept: CALL CENTER | Facility: HOSPITAL | Age: 50
End: 2025-08-18
Payer: MEDICARE

## 2025-08-19 ENCOUNTER — OFFICE VISIT (OUTPATIENT)
Dept: NEUROSURGERY | Facility: CLINIC | Age: 50
End: 2025-08-19
Payer: MEDICARE

## 2025-08-19 ENCOUNTER — HOSPITAL ENCOUNTER (OUTPATIENT)
Dept: GENERAL RADIOLOGY | Facility: HOSPITAL | Age: 50
Discharge: HOME | End: 2025-08-19
Admitting: PHYSICIAN ASSISTANT
Payer: MEDICARE

## 2025-08-19 VITALS — TEMPERATURE: 96.8 F | HEIGHT: 63 IN | BODY MASS INDEX: 37.74 KG/M2 | WEIGHT: 213 LBS

## 2025-08-19 DIAGNOSIS — M47.12 CERVICAL SPONDYLOSIS WITH MYELOPATHY: Primary | ICD-10-CM

## 2025-08-19 DIAGNOSIS — Z98.1 S/P CERVICAL SPINAL FUSION: ICD-10-CM

## 2025-08-19 PROCEDURE — 99024 POSTOP FOLLOW-UP VISIT: CPT | Performed by: PHYSICIAN ASSISTANT

## 2025-08-19 PROCEDURE — 72040 X-RAY EXAM NECK SPINE 2-3 VW: CPT

## (undated) DEVICE — PROXIMATE RH ROTATING HEAD SKIN STAPLERS (35 WIDE) CONTAINS 35 STAINLESS STEEL STAPLES: Brand: PROXIMATE

## (undated) DEVICE — DRILL BIT G3600321 OC 3.2MM FLEX

## (undated) DEVICE — CONN TBG Y 5 IN 1 LF STRL

## (undated) DEVICE — BIT NAV2076 VERT STERILE DRILL BIT 2.9MM: Brand: VERTEX® RECONSTRUCTION SYSTEM

## (undated) DEVICE — SYR LUERLOK 30CC

## (undated) DEVICE — SHEET,DRAPE,40X58,STERILE: Brand: MEDLINE

## (undated) DEVICE — MAYFIELD® DISPOSABLE ADULT SKULL PIN (PLASTIC BASE): Brand: MAYFIELD®

## (undated) DEVICE — DRILL BIT G3606010 2.4MM

## (undated) DEVICE — APPL CHLORAPREP TINTED 26ML TEAL

## (undated) DEVICE — RESERVOIR,SUCTION,100CC,SILICONE: Brand: MEDLINE

## (undated) DEVICE — STRAP POSTN KN/BDY FM 5X72IN DISP

## (undated) DEVICE — GAUZE,SPONGE,4"X4",12PLY,STRL,LF,10/TRAY: Brand: MEDLINE

## (undated) DEVICE — RUBBERBAND LF STRL PK/2

## (undated) DEVICE — SUT ETHLN 3/0 FS1 30IN 669H

## (undated) DEVICE — SNAP KOVER: Brand: UNBRANDED

## (undated) DEVICE — TOOL MR8-14MH30T MR8 14CM MH SYMTRI 3MM: Brand: MIDAS REX MR8

## (undated) DEVICE — TRAP,MUCUS SPECIMEN,40CC: Brand: MEDLINE

## (undated) DEVICE — ANTIBACTERIAL UNDYED BRAIDED (POLYGLACTIN 910), SYNTHETIC ABSORBABLE SUTURE: Brand: COATED VICRYL

## (undated) DEVICE — SPHR MARKR STEALTH STATION

## (undated) DEVICE — DRILL BIT NAVG3606010 NAVIGATED: Brand: NAVIGATED INFINITY™ OCCIPITOCERVICAL UPPER THORACIC SYSTEM

## (undated) DEVICE — BLANKT WARM LOWR/BDY 100X120CM

## (undated) DEVICE — SUT VIC PLS CTD ANTIB BR 3/0 8/18IN 45CM

## (undated) DEVICE — JP PERF DRN SIL FLT 7MM FULL: Brand: CARDINAL HEALTH

## (undated) DEVICE — PATIENT RETURN ELECTRODE, SINGLE-USE, CONTACT QUALITY MONITORING, ADULT, WITH 9FT CORD, FOR PATIENTS WEIGING OVER 33LBS. (15KG): Brand: MEGADYNE

## (undated) DEVICE — BIT DRL VERTEX NAV 2.4MM

## (undated) DEVICE — IRRIGATOR BULB ASEPTO 60CC STRL

## (undated) DEVICE — GLV SURG PREMIERPRO MIC LTX PF SZ7.5 BRN

## (undated) DEVICE — PK NEURO DISC 10

## (undated) DEVICE — DISPOSABLE IRRIGATION BIPOLAR CORD, M1000 TYPE: Brand: KIRWAN

## (undated) DEVICE — DRAPE,INSTRUMENT,MAGNETIC,10X16: Brand: MEDLINE

## (undated) DEVICE — INTENDED FOR TISSUE SEPARATION, AND OTHER PROCEDURES THAT REQUIRE A SHARP SURGICAL BLADE TO PUNCTURE OR CUT.: Brand: BARD-PARKER ® STAINLESS STEEL BLADES

## (undated) DEVICE — GAUZE,BORDER,4"X8",2"X6"PAD,STERILE: Brand: MEDLINE

## (undated) DEVICE — TOOL MR8-15MH22 MR8 15CM MATCH 2.2MM: Brand: MIDAS REX MR8

## (undated) DEVICE — PACK,UNIVERSAL,NO GOWNS: Brand: MEDLINE

## (undated) DEVICE — SUT VIC 0 CTD BR 18IN UNDYE VCP724D

## (undated) DEVICE — SUT SILK 2/0 PS 18IN 1588H